# Patient Record
Sex: FEMALE | Race: BLACK OR AFRICAN AMERICAN | Employment: OTHER | URBAN - METROPOLITAN AREA
[De-identification: names, ages, dates, MRNs, and addresses within clinical notes are randomized per-mention and may not be internally consistent; named-entity substitution may affect disease eponyms.]

---

## 2017-01-09 RX ORDER — LOSARTAN POTASSIUM 100 MG/1
TABLET ORAL
Qty: 90 TAB | Refills: 1 | Status: SHIPPED | COMMUNITY
Start: 2017-01-09 | End: 2017-07-02 | Stop reason: SDUPTHER

## 2017-01-19 ENCOUNTER — OFFICE VISIT (OUTPATIENT)
Dept: INTERNAL MEDICINE CLINIC | Age: 62
End: 2017-01-19

## 2017-01-19 VITALS
WEIGHT: 193 LBS | HEIGHT: 61 IN | HEART RATE: 61 BPM | OXYGEN SATURATION: 98 % | BODY MASS INDEX: 36.44 KG/M2 | DIASTOLIC BLOOD PRESSURE: 64 MMHG | TEMPERATURE: 96.8 F | RESPIRATION RATE: 20 BRPM | SYSTOLIC BLOOD PRESSURE: 133 MMHG

## 2017-01-19 DIAGNOSIS — F31.77 BIPOLAR DISORDER, IN PARTIAL REMISSION, MOST RECENT EPISODE MIXED (HCC): ICD-10-CM

## 2017-01-19 DIAGNOSIS — Z53.20 COLONOSCOPY REFUSED: ICD-10-CM

## 2017-01-19 DIAGNOSIS — I10 ESSENTIAL HYPERTENSION: ICD-10-CM

## 2017-01-19 DIAGNOSIS — E78.00 PURE HYPERCHOLESTEROLEMIA: Chronic | ICD-10-CM

## 2017-01-19 DIAGNOSIS — K59.00 CONSTIPATION, UNSPECIFIED CONSTIPATION TYPE: ICD-10-CM

## 2017-01-19 DIAGNOSIS — E11.9 TYPE 2 DIABETES MELLITUS WITHOUT COMPLICATION, WITHOUT LONG-TERM CURRENT USE OF INSULIN (HCC): Primary | ICD-10-CM

## 2017-01-19 RX ORDER — SIMVASTATIN 20 MG/1
20 TABLET, FILM COATED ORAL
Qty: 90 TAB | Refills: 1 | Status: SHIPPED | OUTPATIENT
Start: 2017-01-19 | End: 2017-07-22 | Stop reason: SDUPTHER

## 2017-01-19 RX ORDER — AMOXICILLIN 250 MG
1 CAPSULE ORAL DAILY
Qty: 30 TAB | Refills: 3 | Status: SHIPPED | OUTPATIENT
Start: 2017-01-19 | End: 2018-02-14 | Stop reason: SDUPTHER

## 2017-01-19 RX ORDER — CHOLECALCIFEROL (VITAMIN D3) 125 MCG
CAPSULE ORAL
COMMUNITY

## 2017-01-19 RX ORDER — POLYETHYLENE GLYCOL 3350 17 G/17G
17 POWDER, FOR SOLUTION ORAL DAILY
Qty: 850 G | Refills: 3 | Status: SHIPPED | OUTPATIENT
Start: 2017-01-19 | End: 2018-02-14 | Stop reason: SDUPTHER

## 2017-01-19 NOTE — PROGRESS NOTES
HISTORY OF PRESENT ILLNESS  Thomas Fischer is a 59 y.o. female here for follow up. Doing well with all meds. BP seems OK.has COPD.use inhaler,stable. Has depresion is stable. feeling OK. seeing psych. Has diabetes. watching diet. need labs.eye check up is up to date. She has bipolar disorder. seeing psych. stable. Follow-up   Pertinent negatives include no chest pain. COPD   Pertinent negatives include no chest pain. Hypertension    Pertinent negatives include no chest pain, no palpitations and no blurred vision. Diabetes   Pertinent negatives include no chest pain. Constipation    Associated symptoms include constipation. Pertinent negatives include no chills and no fever. Mental Health Problem   Pertinent negatives include no chest pain. Review of Systems   Constitutional: Negative. Negative for chills and fever. HENT: Negative. Eyes: Negative. Negative for blurred vision and double vision. Respiratory: Negative. Cardiovascular: Negative. Negative for chest pain and palpitations. Gastrointestinal: Positive for constipation. Genitourinary: Negative. Musculoskeletal: Negative. Neurological: Negative. Psychiatric/Behavioral: Negative. Physical Exam   Constitutional: She appears well-developed and well-nourished. No distress. Neck: Normal range of motion. Neck supple. No JVD present. No thyromegaly present. Cardiovascular: Normal rate, regular rhythm, normal heart sounds and intact distal pulses. Pulmonary/Chest: Effort normal and breath sounds normal. No respiratory distress. She has no wheezes. Musculoskeletal: She exhibits no edema or tenderness. Psychiatric: She has a normal mood and affect. Her behavior is normal.       ASSESSMENT and PLAN    Ramon Lamar was seen today for follow-up, copd, hypertension, diabetes and constipation.     Diagnoses and all orders for this visit:    Type 2 diabetes mellitus without complication, without long-term current use of insulin (HCC)    Stable. on meds. Essential hypertension    On losartan and norvasc. Pure hypercholesterolemia    Will refill,  -     simvastatin (ZOCOR) 20 mg tablet; Take 1 Tab by mouth nightly. Bipolar disorder, in partial remission, most recent episode mixed (HCC)    Stable. on meds. seeing . Constipation, unspecified constipation type    High fibre diet. -     polyethylene glycol (MIRALAX) 17 gram/dose powder; Take 17 g by mouth daily. -     senna-docusate (PERICOLACE) 8.6-50 mg per tablet; Take 1 Tab by mouth daily. Colonoscopy refused        Discussed expected course/resolution/complications of diagnosis in detail with patient. Medication risks/benefits/costs/interactions/alternatives discussed with patient. Pt was given an after visit summary which includes diagnoses, current medications & vitals. Pt expressed understanding with the diagnosis and plan.

## 2017-01-19 NOTE — PROGRESS NOTES
Health Maintenance Due   Topic Date Due    Hepatitis C Screening  1955    Pneumococcal 19-64 Medium Risk (1 of 1 - PPSV23) 07/07/1972    DTaP/Tdap/Td series (1 - Tdap) 07/07/1974    PAP AKA CERVICAL CYTOLOGY  07/07/1974    FOBT Q 1 YEAR AGE 50-75  07/07/2003    ZOSTER VACCINE AGE 60>  07/07/2013    FOOT EXAM Q1  06/08/2016    INFLUENZA AGE 9 TO ADULT  08/01/2016    HEMOGLOBIN A1C Q6M  02/10/2017       Chief Complaint   Patient presents with    Follow-up     4 month    COPD    Hypertension    Diabetes    Constipation     states is constipated all the time       1. Have you been to the ER, urgent care clinic since your last visit? Hospitalized since your last visit? No    2. Have you seen or consulted any other health care providers outside of the [address] [place] [address] since your last visit? Include any pap smears or colon screening. No    3) Do you have an Advance Directive on file? no    4) Are you interested in receiving information on Advance Directives? NO      Patient is accompanied by self I have received verbal consent from Kristian Cat to discuss any/all medical information while they are present in the room.

## 2017-01-19 NOTE — MR AVS SNAPSHOT
Visit Information Date & Time Provider Department Dept. Phone Encounter #  
 1/19/2017  1:00 PM Asha Flores MD [facility] Internal Medicine [phone] [id number] Upcoming Health Maintenance Date Due Hepatitis C Screening 1955 Pneumococcal 19-64 Medium Risk (1 of 1 - PPSV23) 7/7/1972 DTaP/Tdap/Td series (1 - Tdap) 7/7/1974 PAP AKA CERVICAL CYTOLOGY 7/7/1974 FOBT Q 1 YEAR AGE 50-75 7/7/2003 ZOSTER VACCINE AGE 60> 7/7/2013 FOOT EXAM Q1 6/8/2016 INFLUENZA AGE 9 TO ADULT 8/1/2016 HEMOGLOBIN A1C Q6M 2/10/2017 MICROALBUMIN Q1 4/11/2017 EYE EXAM RETINAL OR DILATED Q1 7/29/2017 LIPID PANEL Q1 11/30/2017 BREAST CANCER SCRN MAMMOGRAM 5/11/2018 Allergies as of 1/19/2017  Review Complete On: 1/19/2017 By: Asha Flores MD  
  
 Severity Noted Reaction Type Reactions Pcn [Penicillins]  02/25/2011    Rash Pcn [Penicillins]  04/12/2011    Hives Current Immunizations  Reviewed on 8/10/2016 Name Date Pneumococcal Conjugate (PCV-13) 8/10/2016 Not reviewed this visit You Were Diagnosed With   
  
 Codes Comments Type 2 diabetes mellitus without complication, without long-term current use of insulin (HCC)    -  Primary ICD-10-CM: E11.9 ICD-9-CM: 250.00 Essential hypertension     ICD-10-CM: I10 
ICD-9-CM: 401.9 Pure hypercholesterolemia     ICD-10-CM: E78.00 ICD-9-CM: 272.0 Bipolar disorder, in partial remission, most recent episode mixed [facility])     ICD-10-CM: F31.77 ICD-9-CM: 296.65 Constipation, unspecified constipation type     ICD-10-CM: K59.00 ICD-9-CM: 564.00 Colonoscopy refused     ICD-10-CM: Z53.20 ICD-9-CM: V64.2 Vitals BP Pulse Temp Resp Height(growth percentile) Weight(growth percentile) 133/64 (BP 1 Location: Left arm, BP Patient Position: Sitting) 61 96.8 °F (36 °C) (Oral) 20 5' 1\" (1.549 m) 193 lb (87.5 kg) SpO2 BMI OB Status Smoking Status 98% 36.47 kg/m2 Postmenopausal Current Every Day Smoker Vitals History BMI and BSA Data Body Mass Index Body Surface Area  
 36.47 kg/m 2 1.94 m 2 Preferred Pharmacy Pharmacy Name Phone [id number]- [place], [place] - [address] [phone] Your Updated Medication List  
  
   
This list is accurate as of: 1/19/17  1:42 PM.  Always use your most recent med list. amLODIPine 10 mg tablet Commonly known as:  Voncile Mehama TAKE 1 TABLET BY MOUTH EVERY DAY  
  
 ammonium lactate 12 % lotion Commonly known as:  LAC-HYDRIN TWELVE  
rub in to affected area well  
  
 aspirin delayed-release 81 mg tablet Take  by mouth daily. Blood-Glucose Meter monitoring kit Check sugar QD  
  
 famotidine 20 mg tablet Commonly known as:  PEPCID Take 1 Tab by mouth nightly. FLUoxetine 20 mg capsule Commonly known as:  PROzac TAKE 1 TABLET BY MOUTH EVERY MORNING. glucose blood VI test strips strip Commonly known as:  ASCENSIA AUTODISC VI, ONE TOUCH ULTRA TEST VI Check BS QD Lancets Misc Check blood sugar one time a day  
  
 losartan 100 mg tablet Commonly known as:  COZAAR  
TAKE 1 TABLET EVERY DAY  
  
 metFORMIN 500 mg tablet Commonly known as:  GLUCOPHAGE  
TAKE 1 TABLET BY MOUTH TWO (2) TIMES DAILY (WITH MEALS). metoprolol tartrate 50 mg tablet Commonly known as:  LOPRESSOR  
TAKE 1 TABLET TWICE A DAY  
  
 polyethylene glycol 17 gram/dose powder Commonly known as:  Cathleen  Take 17 g by mouth daily. RISPERDAL M-TAB PO Take  by mouth daily. senna-docusate 8.6-50 mg per tablet Commonly known as:  Euna Brill Take 1 Tab by mouth daily. simvastatin 20 mg tablet Commonly known as:  ZOCOR Take 1 Tab by mouth nightly. TRILEPTAL PO Take 600 mg by mouth daily. VITAMIN D3 2,000 unit Tab Generic drug:  cholecalciferol (vitamin D3) Take  by mouth. Prescriptions Sent to Pharmacy Refills  
 polyethylene glycol (MIRALAX) 17 gram/dose powder 3 Sig: Take 17 g by mouth daily. Class: Normal  
 Pharmacy: [facility]/pharmacy [address] [place] [address] [address] Ph #: [phone] Route: Oral  
 senna-docusate (PERICOLACE) 8.6-50 mg per tablet 3 Sig: Take 1 Tab by mouth daily. Class: Normal  
 Pharmacy: [facility]/pharmacy [address] [place] [address] [address] Ph #: [phone] Route: Oral  
 simvastatin (ZOCOR) 20 mg tablet 1 Sig: Take 1 Tab by mouth nightly. Class: Normal  
 Pharmacy: [facility]/pharmacy [address] [place] [address] [address] Ph #: [phone] Route: Oral  
  
Patient Instructions High-Fiber Diet: Care Instructions Your Care Instructions A high-fiber diet may help you relieve constipation and feel less bloated. Your doctor and dietitian will help you make a high-fiber eating plan based on your personal needs. The plan will include the things you like to eat. It will also make sure that you get 30 grams of fiber a day. Before you make changes to the way you eat, be sure to talk with your doctor or dietitian. Follow-up care is a key part of your treatment and safety. Be sure to make and go to all appointments, and call your doctor if you are having problems. It's also a good idea to know your test results and keep a list of the medicines you take. How can you care for yourself at home? · You can increase how much fiber you get if you eat more of certain foods. These foods include: ¨ Whole-grain breads and cereals. ¨ Fruits, such as pears, apples, and peaches. Eat the skins, peels, and seeds, if you can. ¨ Vegetables, such as broccoli, cabbage, spinach, carrots, asparagus, and squash. ¨ Starchy vegetables. These include potatoes with skins, kidney beans, and lima beans. · Take a fiber supplement every day if your doctor recommends it. Examples are Benefiber, Citrucel, FiberCon, and Metamucil. Ask your doctor how much to take. · Drink plenty of fluids, enough so that your urine is light yellow or clear like water. If you have kidney, heart, or liver disease and have to limit fluids, talk with your doctor before you increase the amount of fluids you drink. · Get some exercise every day. Exercise helps stool move through the colon. It also helps prevent constipation. · Keep a food diary. Try to notice and write down what foods cause gas, pain, or other symptoms. Then you can avoid these foods. Where can you learn more? Go to http://duncan-niles.info/. Enter D366 in the search box to learn more about \"High-Fiber Diet: Care Instructions. \" Current as of: July 26, 2016 Content Version: 11.1 © 1546-8629 [employer] [place]. Care instructions adapted under license by [employer] (which disclaims liability or warranty for this information). If you have questions about a medical condition or this instruction, always ask your healthcare professional. [place] [address] any warranty or liability for your use of this information. Introducing [facility]! Dear Phil Wills: Thank you for requesting a Hexagram 49 account. Our records indicate that you have previously registered for a Hexagram 49 account but its currently inactive. Please call our Hexagram 49 support line at [phone]. Additional Information If you have questions, please visit the Frequently Asked Questions section of the Hexagram 49 website at [email or web address] Remember, Hexagram 49 is NOT to be used for urgent needs. For medical emergencies, dial 911. Now available from your iPhone and Android! Please provide this summary of care documentation to your next provider. Your primary care clinician is listed as [facility] Nurse Navigator. If you have any questions after today's visit, please call [phone].

## 2017-01-19 NOTE — PATIENT INSTRUCTIONS
High-Fiber Diet: Care Instructions  Your Care Instructions  A high-fiber diet may help you relieve constipation and feel less bloated. Your doctor and dietitian will help you make a high-fiber eating plan based on your personal needs. The plan will include the things you like to eat. It will also make sure that you get 30 grams of fiber a day. Before you make changes to the way you eat, be sure to talk with your doctor or dietitian. Follow-up care is a key part of your treatment and safety. Be sure to make and go to all appointments, and call your doctor if you are having problems. It's also a good idea to know your test results and keep a list of the medicines you take. How can you care for yourself at home? · You can increase how much fiber you get if you eat more of certain foods. These foods include:  ¨ Whole-grain breads and cereals. ¨ Fruits, such as pears, apples, and peaches. Eat the skins, peels, and seeds, if you can. ¨ Vegetables, such as broccoli, cabbage, spinach, carrots, asparagus, and squash. ¨ Starchy vegetables. These include potatoes with skins, kidney beans, and lima beans. · Take a fiber supplement every day if your doctor recommends it. Examples are Benefiber, Citrucel, FiberCon, and Metamucil. Ask your doctor how much to take. · Drink plenty of fluids, enough so that your urine is light yellow or clear like water. If you have kidney, heart, or liver disease and have to limit fluids, talk with your doctor before you increase the amount of fluids you drink. · Get some exercise every day. Exercise helps stool move through the colon. It also helps prevent constipation. · Keep a food diary. Try to notice and write down what foods cause gas, pain, or other symptoms. Then you can avoid these foods. Where can you learn more? Go to http://duncan-niles.info/. Enter B247 in the search box to learn more about \"High-Fiber Diet: Care Instructions. \"  Current as of: July 26, 2016  Content Version: 11.1  © 1809-5843 [employer]. Care instructions adapted under license by [employer] (which disclaims liability or warranty for this information). If you have questions about a medical condition or this instruction, always ask your healthcare professional. [place] [address] any warranty or liability for your use of this information.

## 2017-03-30 RX ORDER — METFORMIN HYDROCHLORIDE 500 MG/1
TABLET ORAL
Qty: 180 TAB | Refills: 1 | Status: SHIPPED | OUTPATIENT
Start: 2017-03-30 | End: 2017-09-25 | Stop reason: SDUPTHER

## 2017-04-11 RX ORDER — METOPROLOL TARTRATE 50 MG/1
TABLET ORAL
Qty: 180 TAB | Refills: 1 | Status: SHIPPED | OUTPATIENT
Start: 2017-04-11 | End: 2017-10-05 | Stop reason: SDUPTHER

## 2017-05-25 ENCOUNTER — HOSPITAL ENCOUNTER (OUTPATIENT)
Dept: MAMMOGRAPHY | Age: 62
Discharge: HOME | End: 2017-05-25
Attending: INTERNAL MEDICINE
Payer: COMMERCIAL

## 2017-05-25 DIAGNOSIS — Z12.31 VISIT FOR SCREENING MAMMOGRAM: ICD-10-CM

## 2017-05-25 PROCEDURE — 77067 SCR MAMMO BI INCL CAD: CPT

## 2017-06-22 ENCOUNTER — HOSPITAL ENCOUNTER (OUTPATIENT)
Dept: MAMMOGRAPHY | Age: 62
Discharge: HOME | End: 2017-06-22
Attending: INTERNAL MEDICINE
Payer: COMMERCIAL

## 2017-06-22 DIAGNOSIS — R92.8 ABNORMAL MAMMOGRAM OF RIGHT BREAST: ICD-10-CM

## 2017-06-22 PROCEDURE — 77065 DX MAMMO INCL CAD UNI: CPT

## 2017-06-22 NOTE — LETTER
6/28/2017 9:33 AM 
 
Ms. Aruna Saldana [address] [phone] Dear Aruna Saldana: 
 
Please find your most recent results below. Resulted Orders [facility] MAMMO RT DX INCL CAD Narrative EXAM:  [facility] MAMMO RT DX INCL CAD INDICATION:  right add'l v for calcs COMPARISON STUDY: Screening mammogram of 5/25/2017 and earlier studies BREAST COMPOSITION:  Scattered fibroglandular densities UNILATERAL DIGITAL RIGHT DIAGNOSTIC MAMMOGRAPHY: 
Unilateral digital right mammography was performed with ML and magnification 
views views. Images were reviewed with the CAD interpretation system. The calcifications of concern in the superior aspect of the right breast have 
increased slightly in number and density since recent previous studies, but 
appear relatively coarse. There are multiple other groups of calcifications 
which are stable since at least 2014 but better depicted currently because of 
technical differences. Impression IMPRESSION: 
1. BIRADS 3: Probably Benign Finding. 2. Followup unilateral right mammography in 6 months is recommended, to include 
magnification views. Patient was informed of the findings. RECOMMENDATIONS: 
None. Keep up the good work! Follow up in 6 months Please call me if you have any questions: [phone] Sincerely, [facility] 1

## 2017-06-28 ENCOUNTER — OFFICE VISIT (OUTPATIENT)
Dept: INTERNAL MEDICINE CLINIC | Age: 62
End: 2017-06-28

## 2017-06-28 VITALS
TEMPERATURE: 98.4 F | DIASTOLIC BLOOD PRESSURE: 65 MMHG | SYSTOLIC BLOOD PRESSURE: 142 MMHG | WEIGHT: 188.6 LBS | BODY MASS INDEX: 35.61 KG/M2 | OXYGEN SATURATION: 95 % | HEIGHT: 61 IN | HEART RATE: 62 BPM | RESPIRATION RATE: 17 BRPM

## 2017-06-28 DIAGNOSIS — I10 ESSENTIAL HYPERTENSION: ICD-10-CM

## 2017-06-28 DIAGNOSIS — E11.9 TYPE 2 DIABETES MELLITUS WITHOUT COMPLICATION, WITHOUT LONG-TERM CURRENT USE OF INSULIN (HCC): Primary | ICD-10-CM

## 2017-06-28 DIAGNOSIS — Z78.0 MENOPAUSE: ICD-10-CM

## 2017-06-28 DIAGNOSIS — J44.9 CHRONIC OBSTRUCTIVE PULMONARY DISEASE, UNSPECIFIED COPD TYPE (HCC): ICD-10-CM

## 2017-06-28 DIAGNOSIS — E87.1 HYPONATREMIA: ICD-10-CM

## 2017-06-28 NOTE — PROGRESS NOTES
Chief Complaint   Patient presents with    COPD    Hypertension    Diabetes     1. Have you been to the ER, urgent care clinic since your last visit? Hospitalized since your last visit? No    2. Have you seen or consulted any other health care providers outside of the [address] [place] [address] since your last visit? Include any pap smears or colon screening.  No

## 2017-06-28 NOTE — LETTER
7/5/2017 3:38 PM 
 
Ms. Jessenia Mancera [address] [place] [phone] Dear Jessenia Mancera: 
 
Please find your most recent results below. Resulted Orders METABOLIC PANEL, COMPREHENSIVE Result Value Ref Range Glucose 127 (H) 65 - 99 mg/dL BUN 7 (L) 8 - 27 mg/dL Creatinine 0.54 (L) 0.57 - 1.00 mg/dL GFR est non- >59 mL/min/1.73 GFR est  >59 mL/min/1.73  
 BUN/Creatinine ratio 13 12 - 28 Sodium 128 (L) 134 - 144 mmol/L Potassium 5.3 (H) 3.5 - 5.2 mmol/L Chloride 90 (L) 96 - 106 mmol/L  
 CO2 19 18 - 29 mmol/L Calcium 9.3 8.7 - 10.3 mg/dL Protein, total 7.2 6.0 - 8.5 g/dL Albumin 4.4 3.6 - 4.8 g/dL GLOBULIN, TOTAL 2.8 1.5 - 4.5 g/dL A-G Ratio 1.6 1.2 - 2.2 Bilirubin, total 0.3 0.0 - 1.2 mg/dL Alk. phosphatase 117 39 - 117 IU/L  
 AST (SGOT) 21 0 - 40 IU/L  
 ALT (SGPT) 15 0 - 32 IU/L Narrative Performed at:  [address]  [id number] : Kaycee Chavira MD, Phone:  [phone] HEMOGLOBIN A1C WITH EAG Result Value Ref Range Hemoglobin A1c 6.3 (H) 4.8 - 5.6 % Comment:  
            Pre-diabetes: 5.7 - 6.4 Diabetes: >6.4 Glycemic control for adults with diabetes: <7.0 Estimated average glucose 134 mg/dL Narrative Performed at:  [address]  [id number] : Kaycee Chavira MD, Phone:  [phone] MICROALBUMIN, UR, RAND W/ MICROALBUMIN/CREA RATIO Result Value Ref Range Creatinine, urine 43.3 Not Estab. mg/dL Microalbumin, urine 155.7 Not Estab. ug/mL Microalb/Creat ratio (ug/mg creat.) 359.6 (H) 0.0 - 30.0 mg/g creat Narrative Performed at:  [address]  [id number] : Kaycee Chavira MD, Phone:  [phone] CBC W/O DIFF Result Value Ref Range WBC 10.5 3.4 - 10.8 x10E3/uL RBC 4.40 3.77 - 5.28 x10E6/uL HGB 14.8 11.1 - 15.9 g/dL HCT 42.2 34.0 - 46.6 % MCV 96 79 - 97 fL  
 MCH 33.6 (H) 26.6 - 33.0 pg  
 MCHC 35.1 31.5 - 35.7 g/dL  
 RDW 14.1 12.3 - 15.4 % PLATELET 432 311 - 093 x10E3/uL Narrative Performed at:  [address]  [id number] : Kathryn Avery MD, Phone:  [phone] RECOMMENDATIONS: 
Nina Good your labs indicate that you have hyponatremia which is probably due to one of your medications, Trileptal, please contact your psych ASAP to reduce dosage and we will repeat your labs in 2 weeks. I have enclosed that lab slip for your convenience Please call me if you have any questions: [phone] Sincerely, Norbert Burciaga MD

## 2017-06-28 NOTE — PROGRESS NOTES
HISTORY OF PRESENT ILLNESS  Gabrielle Owens is a 59 y.o. female here for follow up. Doing well with all meds. BP seems OK.has COPD. on inhaler. She is taking prozac and trileptal..depresion is stable. feeling OK. Has diabetes. watching diet. need labs.eye check up is up to date. Need dexa scan. COPD   Pertinent negatives include no chest pain. Hypertension    Pertinent negatives include no chest pain, no palpitations and no blurred vision. Diabetes   Pertinent negatives include no chest pain. Follow-up   Pertinent negatives include no chest pain. Mental Health Problem   Pertinent negatives include no chest pain. Review of Systems   Constitutional: Negative. Negative for chills and fever. HENT: Negative. Eyes: Negative. Negative for blurred vision and double vision. Respiratory: Negative. Cardiovascular: Negative. Negative for chest pain and palpitations. Gastrointestinal: Negative. Genitourinary: Negative. Musculoskeletal: Negative. Neurological: Negative. Psychiatric/Behavioral: Negative. Physical Exam   Constitutional: She appears well-developed and well-nourished. No distress. Neck: Normal range of motion. Neck supple. No JVD present. No thyromegaly present. Cardiovascular: Normal rate, regular rhythm, normal heart sounds and intact distal pulses. Pulmonary/Chest: Effort normal and breath sounds normal. No respiratory distress. She has no wheezes. Musculoskeletal: She exhibits no edema or tenderness. Psychiatric: She has a normal mood and affect. Her behavior is normal.       ASSESSMENT and Scharjerrie Reema was seen today for copd, hypertension and diabetes. Diagnoses and all orders for this visit:    Type 2 diabetes mellitus without complication, without long-term current use of insulin ([address]    On metformin.   Will do,  -     METABOLIC PANEL, COMPREHENSIVE  -     HEMOGLOBIN A1C WITH EAG  -     MICROALBUMIN, UR, RAND W/ MICROALBUMIN/CREA RATIO  -     CBC W/O DIFF  -     REFERRAL TO OPHTHALMOLOGY    Chronic obstructive pulmonary disease, unspecified COPD type (HCC)    Stable. on inhaler. Will do,  -     CBC W/O DIFF    Essential hypertension    On med. will do,  -     METABOLIC PANEL, COMPREHENSIVE    Menopause    Will order,  -     DEXA BONE DENSITY STUDY AXIAL; Future      Discussed expected course/resolution/complications of diagnosis in detail with patient. Medication risks/benefits/costs/interactions/alternatives discussed with patient. Pt was given an after visit summary which includes diagnoses, current medications & vitals. Pt expressed understanding with the diagnosis and plan.

## 2017-06-29 LAB
ALBUMIN SERPL-MCNC: 4.4 G/DL (ref 3.6–4.8)
ALBUMIN/CREAT UR: 359.6 MG/G CREAT (ref 0–30)
ALBUMIN/GLOB SERPL: 1.6 {RATIO} (ref 1.2–2.2)
ALP SERPL-CCNC: 117 IU/L (ref 39–117)
ALT SERPL-CCNC: 15 IU/L (ref 0–32)
AST SERPL-CCNC: 21 IU/L (ref 0–40)
BILIRUB SERPL-MCNC: 0.3 MG/DL (ref 0–1.2)
BUN SERPL-MCNC: 7 MG/DL (ref 8–27)
BUN/CREAT SERPL: 13 (ref 12–28)
CALCIUM SERPL-MCNC: 9.3 MG/DL (ref 8.7–10.3)
CHLORIDE SERPL-SCNC: 90 MMOL/L (ref 96–106)
CO2 SERPL-SCNC: 19 MMOL/L (ref 18–29)
CREAT SERPL-MCNC: 0.54 MG/DL (ref 0.57–1)
CREAT UR-MCNC: 43.3 MG/DL
ERYTHROCYTE [DISTWIDTH] IN BLOOD BY AUTOMATED COUNT: 14.1 % (ref 12.3–15.4)
EST. AVERAGE GLUCOSE BLD GHB EST-MCNC: 134 MG/DL
GLOBULIN SER CALC-MCNC: 2.8 G/DL (ref 1.5–4.5)
GLUCOSE SERPL-MCNC: 127 MG/DL (ref 65–99)
HBA1C MFR BLD: 6.3 % (ref 4.8–5.6)
HCT VFR BLD AUTO: 42.2 % (ref 34–46.6)
HGB BLD-MCNC: 14.8 G/DL (ref 11.1–15.9)
MCH RBC QN AUTO: 33.6 PG (ref 26.6–33)
MCHC RBC AUTO-ENTMCNC: 35.1 G/DL (ref 31.5–35.7)
MCV RBC AUTO: 96 FL (ref 79–97)
MICROALBUMIN UR-MCNC: 155.7 UG/ML
PLATELET # BLD AUTO: 242 X10E3/UL (ref 150–379)
POTASSIUM SERPL-SCNC: 5.3 MMOL/L (ref 3.5–5.2)
PROT SERPL-MCNC: 7.2 G/DL (ref 6–8.5)
RBC # BLD AUTO: 4.4 X10E6/UL (ref 3.77–5.28)
SODIUM SERPL-SCNC: 128 MMOL/L (ref 134–144)
WBC # BLD AUTO: 10.5 X10E3/UL (ref 3.4–10.8)

## 2017-07-03 RX ORDER — AMLODIPINE BESYLATE 10 MG/1
TABLET ORAL
Qty: 90 TAB | Refills: 1 | Status: SHIPPED | OUTPATIENT
Start: 2017-07-03 | End: 2017-12-23 | Stop reason: SDUPTHER

## 2017-07-03 RX ORDER — LOSARTAN POTASSIUM 100 MG/1
TABLET ORAL
Qty: 90 TAB | Refills: 1 | Status: SHIPPED | OUTPATIENT
Start: 2017-07-03 | End: 2017-12-23 | Stop reason: SDUPTHER

## 2017-07-05 NOTE — PROGRESS NOTES
Trileptal is causing hypontremia. need to reduce dosage and repeat sodium in 2 weeks. need to see psych ASAp.

## 2017-07-22 DIAGNOSIS — E78.00 PURE HYPERCHOLESTEROLEMIA: Chronic | ICD-10-CM

## 2017-07-24 RX ORDER — SIMVASTATIN 20 MG/1
TABLET, FILM COATED ORAL
Qty: 90 TAB | Refills: 1 | Status: SHIPPED | OUTPATIENT
Start: 2017-07-24 | End: 2017-10-05 | Stop reason: SDUPTHER

## 2017-09-25 RX ORDER — METFORMIN HYDROCHLORIDE 500 MG/1
TABLET ORAL
Qty: 180 TAB | Refills: 1 | Status: SHIPPED | OUTPATIENT
Start: 2017-09-25 | End: 2018-03-22 | Stop reason: SDUPTHER

## 2017-10-05 DIAGNOSIS — E78.00 PURE HYPERCHOLESTEROLEMIA: Chronic | ICD-10-CM

## 2017-10-05 RX ORDER — SIMVASTATIN 20 MG/1
20 TABLET, FILM COATED ORAL
Qty: 90 TAB | Refills: 1 | Status: SHIPPED | OUTPATIENT
Start: 2017-10-05 | End: 2018-03-05 | Stop reason: SDUPTHER

## 2017-10-05 RX ORDER — METOPROLOL TARTRATE 50 MG/1
TABLET ORAL
Qty: 180 TAB | Refills: 1 | Status: SHIPPED | COMMUNITY
Start: 2017-10-05 | End: 2018-03-30 | Stop reason: SDUPTHER

## 2017-10-06 RX ORDER — BLOOD SUGAR DIAGNOSTIC
STRIP MISCELLANEOUS
Qty: 100 STRIP | Refills: 0 | Status: SHIPPED | OUTPATIENT
Start: 2017-10-06 | End: 2022-10-20 | Stop reason: SDUPTHER

## 2017-10-25 ENCOUNTER — HOSPITAL ENCOUNTER (OUTPATIENT)
Dept: GENERAL RADIOLOGY | Age: 62
Discharge: HOME | End: 2017-10-25
Payer: COMMERCIAL

## 2017-10-25 ENCOUNTER — OFFICE VISIT (OUTPATIENT)
Dept: INTERNAL MEDICINE CLINIC | Age: 62
End: 2017-10-25

## 2017-10-25 VITALS
HEIGHT: 61 IN | SYSTOLIC BLOOD PRESSURE: 142 MMHG | HEART RATE: 61 BPM | DIASTOLIC BLOOD PRESSURE: 65 MMHG | OXYGEN SATURATION: 97 % | TEMPERATURE: 97.9 F | RESPIRATION RATE: 20 BRPM | WEIGHT: 183.4 LBS | BODY MASS INDEX: 34.63 KG/M2

## 2017-10-25 DIAGNOSIS — Z11.59 NEED FOR HEPATITIS C SCREENING TEST: ICD-10-CM

## 2017-10-25 DIAGNOSIS — F17.200 SMOKING: ICD-10-CM

## 2017-10-25 DIAGNOSIS — E11.9 TYPE 2 DIABETES MELLITUS WITHOUT COMPLICATION, WITHOUT LONG-TERM CURRENT USE OF INSULIN (HCC): Primary | ICD-10-CM

## 2017-10-25 DIAGNOSIS — J44.9 CHRONIC OBSTRUCTIVE PULMONARY DISEASE, UNSPECIFIED COPD TYPE (HCC): ICD-10-CM

## 2017-10-25 DIAGNOSIS — J41.0 SMOKERS' COUGH (HCC): ICD-10-CM

## 2017-10-25 DIAGNOSIS — E78.00 PURE HYPERCHOLESTEROLEMIA: Chronic | ICD-10-CM

## 2017-10-25 DIAGNOSIS — E55.9 VITAMIN D DEFICIENCY: ICD-10-CM

## 2017-10-25 PROCEDURE — 71020 XR CHEST PA LAT: CPT

## 2017-10-25 NOTE — PROGRESS NOTES
Health Maintenance Due   Topic Date Due    Hepatitis C Screening  1955    Pneumococcal 19-64 Medium Risk (1 of 1 - PPSV23) 07/07/1972    DTaP/Tdap/Td series (1 - Tdap) 07/07/1974    PAP AKA CERVICAL CYTOLOGY  07/07/1974    FOBT Q 1 YEAR AGE 50-75  07/07/2003    ZOSTER VACCINE AGE 60>  05/07/2013    FOOT EXAM Q1  06/08/2016    EYE EXAM RETINAL OR DILATED Q1  07/29/2017       Chief Complaint   Patient presents with    COPD    Hypertension    Diabetes    Mental Health Problem    Cough     states dry cough x a few weeks       1. Have you been to the ER, urgent care clinic since your last visit? Hospitalized since your last visit? No    2. Have you seen or consulted any other health care providers outside of the [address] [place] [address] since your last visit? Include any pap smears or colon screening. No    3) Do you have an Advance Directive on file? no    4) Are you interested in receiving information on Advance Directives? NO      Patient is accompanied by self I have received verbal consent from Naty Bakrer to discuss any/all medical information while they are present in the room.

## 2017-10-25 NOTE — PATIENT INSTRUCTIONS

## 2017-10-25 NOTE — LETTER
11/6/2017 11:02 AM 
 
Ms. Latisha Roberson [address] [place] [phone] Dear Latisha Roberson: 
 
Please find your most recent results below. Resulted Orders METABOLIC PANEL, COMPREHENSIVE Result Value Ref Range Glucose 131 (H) 65 - 99 mg/dL BUN 4 (L) 8 - 27 mg/dL Creatinine 0.60 0.57 - 1.00 mg/dL GFR est non-AA 97 >59 mL/min/1.73 GFR est  >59 mL/min/1.73  
 BUN/Creatinine ratio 7 (L) 12 - 28 Sodium 134 134 - 144 mmol/L Potassium 5.3 (H) 3.5 - 5.2 mmol/L Chloride 94 (L) 96 - 106 mmol/L  
 CO2 21 18 - 29 mmol/L Calcium 9.7 8.7 - 10.3 mg/dL Protein, total 7.3 6.0 - 8.5 g/dL Albumin 4.4 3.6 - 4.8 g/dL GLOBULIN, TOTAL 2.9 1.5 - 4.5 g/dL A-G Ratio 1.5 1.2 - 2.2 Bilirubin, total 0.5 0.0 - 1.2 mg/dL Alk. phosphatase 109 39 - 117 IU/L  
 AST (SGOT) 19 0 - 40 IU/L  
 ALT (SGPT) 13 0 - 32 IU/L Narrative Performed at:  [address]  [id number] : Darletta Dancer MD, Phone:  [phone] LIPID PANEL Result Value Ref Range Cholesterol, total 185 100 - 199 mg/dL Triglyceride 140 0 - 149 mg/dL HDL Cholesterol 92 >39 mg/dL VLDL, calculated 28 5 - 40 mg/dL LDL, calculated 65 0 - 99 mg/dL Narrative Performed at:  [address]  [id number] : Darletta Dancer MD, Phone:  [phone] HEMOGLOBIN A1C WITH EAG Result Value Ref Range Hemoglobin A1c 6.3 (H) 4.8 - 5.6 % Comment:  
            Pre-diabetes: 5.7 - 6.4 Diabetes: >6.4 Glycemic control for adults with diabetes: <7.0 Estimated average glucose 134 mg/dL Narrative Performed at:  [facility] [address] [place] [address]  [id number] : Darletta Dancer MD, Phone:  [phone] VITAMIN D, 25 HYDROXY Result Value Ref Range VITAMIN D, 25-HYDROXY 23.0 (L) 30.0 - 100.0 ng/mL Comment: Vitamin D deficiency has been defined by the [facility] practice guideline as a 
level of serum 25-OH vitamin D less than 20 ng/mL (1,2). The Endocrine Society went on to further define vitamin D 
insufficiency as a level between 21 and 29 ng/mL (2). 1. IOM ([place] of Medicine). 2010. Dietary reference 
   intakes for calcium and D. [facility]: The 
   4s91.com. 2. Isha MF, Laura NC, Leland BREWER, et al. 
   Evaluation, treatment, and prevention of vitamin D 
   deficiency: an Endocrine Society clinical practice 
   guideline. JCEM. 2011 Jul; 96(7):1911-30. Narrative Performed at:  [address]  [id number] : Bailey Aquino MD, Phone:  [phone] HEPATITIS C AB Result Value Ref Range Hep C Virus Ab <0.1 0.0 - 0.9 s/co ratio Comment:  
                                     Negative:     < 0.8 Indeterminate: 0.8 - 0.9 Positive:     > 0.9 The CDC recommends that a positive HCV antibody result 
 be followed up with a HCV Nucleic Acid Amplification 
 test (026028). Narrative Performed at:  [address]  [id number] : Bailey Aquino MD, Phone:  [phone] CVD REPORT Result Value Ref Range INTERPRETATION Note Comment:  
   Supplemental report is available. Narrative Performed at:  [facility] [place] [address]  [id number] : Noam Polanco PhD, Phone:  [phone] DIABETES PATIENT EDUCATION Result Value Ref Range PDF Image Not applicable Narrative Performed at:  [facility] [place] [address]  [id number] : Noam Polanco PhD, Phone:  [phone] RECOMMENDATIONS: 
 Benito Verdugo your labs indicate that Your Vitamin D level is low, start taking OTC Vitamin D 2000 units 1 x a day for 4 months. Also increase your consumption of milk and exposure to the sun for 20 minutes a day. We will recheck your Vitamin D level in 4 months All other labs are stable Please call me if you have any questions: [phone] Sincerely, Wallace Ji MD

## 2017-10-25 NOTE — LETTER
10/31/2017 10:31 AM 
 
Ms. Luis Erickson [address] [facility] [phone] Dear Luis Erickson: 
 
Please find your most recent results below. Resulted Orders XR CHEST PA LAT Narrative Exam:  2 view chest 
 
Indication: Cough, smoker. COMPARISON: 10/20/2016 PA and lateral views demonstrate normal heart size. The aortic stent is noted 
and unchanged in appearance. The lungs are clear. No pneumonia. No adenopathy. The previous seen noted widening of the right [facility] joint is again noted but this 
does appear that decreased in the interval. 
 
There is bony fusion between the anterior aspect the right first and second ribs 
is unchanged and could be posttraumatic or related to congenital process. Impression IMPRESSION: 
1. No acute process RECOMMENDATIONS: 
None. Keep up the good work! Please call me if you have any questions: [phone] Sincerely, [employer] [facility]

## 2017-10-25 NOTE — MR AVS SNAPSHOT
Visit Information Date & Time Provider Department Dept. Phone Encounter #  
 10/25/2017  1:15 PM Izzy Hayden MD [facility] Internal Medicine [phone] [id number] Upcoming Health Maintenance Date Due Hepatitis C Screening 1955 Pneumococcal 19-64 Medium Risk (1 of 1 - PPSV23) 7/7/1972 DTaP/Tdap/Td series (1 - Tdap) 7/7/1974 PAP AKA CERVICAL CYTOLOGY 7/7/1974 ZOSTER VACCINE AGE 60> 5/7/2013 FOOT EXAM Q1 6/8/2016 EYE EXAM RETINAL OR DILATED Q1 7/29/2017 LIPID PANEL Q1 11/30/2017 HEMOGLOBIN A1C Q6M 12/28/2017 MICROALBUMIN Q1 6/28/2018 BREAST CANCER SCRN MAMMOGRAM 6/22/2019 COLONOSCOPY 10/25/2027 Allergies as of 10/25/2017  Review Complete On: 10/25/2017 By: Izzy Hayden MD  
  
 Severity Noted Reaction Type Reactions Pcn [Penicillins]  02/25/2011    Rash Pcn [Penicillins]  04/12/2011    Hives Current Immunizations  Reviewed on 10/25/2017 Name Date Pneumococcal Conjugate (PCV-13) 8/10/2016 Reviewed by Izzy Hayden MD on 10/25/2017 at  1:35 PM  
You Were Diagnosed With   
  
 Codes Comments Type 2 diabetes mellitus without complication, without long-term current use of insulin (HCC)    -  Primary ICD-10-CM: E11.9 ICD-9-CM: 250.00 Sm[facility]' cough ([facility]     ICD-10-CM: J41.0 ICD-9-CM: 491.0 Smoking     ICD-10-CM: F17.200 ICD-9-CM: 305.1 Chronic obstructive pulmonary disease, unspecified COPD type ([facility]     ICD-10-CM: J44.9 ICD-9-CM: 633 Pure hypercholesterolemia     ICD-10-CM: E78.00 ICD-9-CM: 272.0 Vitamin D deficiency     ICD-10-CM: E55.9 ICD-9-CM: 268.9 Need for hepatitis C screening test     ICD-10-CM: Z11.59 
ICD-9-CM: V73.89 Vitals BP Pulse Temp Resp Height(growth percentile) Weight(growth percentile) 142/65 (BP 1 Location: Left arm, BP Patient Position: Sitting) 61 97.9 °F (36.6 °C) (Oral) 20 5' 1\" (1.549 m) 183 lb 6.4 oz (83.2 kg) SpO2 BMI OB Status Smoking Status 97% 34.65 kg/m2 Postmenopausal Current Every Day Smoker Vitals History BMI and BSA Data Body Mass Index Body Surface Area  
 34.65 kg/m 2 1.89 m 2 Preferred Pharmacy Pharmacy Name Phone [id number]- [place], [place] - [address] [phone] Your Updated Medication List  
  
   
This list is accurate as of: 10/25/17  1:35 PM.  Always use your most recent med list. amLODIPine 10 mg tablet Commonly known as:  Arlyss Daniella TAKE 1 TABLET BY MOUTH EVERY DAY  
  
 ammonium lactate 12 % lotion Commonly known as:  LAC-HYDRIN TWELVE  
rub in to affected area well  
  
 aspirin delayed-release 81 mg tablet Take  by mouth daily. Blood-Glucose Meter monitoring kit Check sugar QD  
  
 famotidine 20 mg tablet Commonly known as:  PEPCID Take 1 Tab by mouth nightly. FLUoxetine 20 mg capsule Commonly known as:  PROzac TAKE 1 TABLET BY MOUTH EVERY MORNING. Lancets Misc Check blood sugar one time a day  
  
 losartan 100 mg tablet Commonly known as:  COZAAR  
TAKE 1 TABLET EVERY DAY  
  
 metFORMIN 500 mg tablet Commonly known as:  GLUCOPHAGE  
TAKE 1 TABLET BY MOUTH TWO (2) TIMES DAILY (WITH MEALS). metoprolol tartrate 50 mg tablet Commonly known as:  LOPRESSOR  
TAKE 1 TABLET TWICE A DAY  
  
 ONETOUCH ULTRA TEST strip Generic drug:  glucose blood VI test strips CHECK BLOOD SUGAR DAILY polyethylene glycol 17 gram/dose powder Commonly known as:  Leata Sans Take 17 g by mouth daily. senna-docusate 8.6-50 mg per tablet Commonly known as:  Angela Manna Take 1 Tab by mouth daily. simvastatin 20 mg tablet Commonly known as:  ZOCOR Take 1 Tab by mouth nightly. tiotropium 18 mcg inhalation capsule Commonly known as:  Sultan Lawrence Take 1 Cap by inhalation daily. TRILEPTAL PO Take 600 mg by mouth daily. VITAMIN D3 2,000 unit Tab Generic drug:  cholecalciferol (vitamin D3) Take  by mouth. Prescriptions Sent to Pharmacy Refills  
 tiotropium (SPIRIVA) 18 mcg inhalation capsule 6 Sig: Take 1 Cap by inhalation daily. Class: Normal  
 Pharmacy: [facility]/pharmacy [id number] [place] [address] [address]  #: [phone] Route: Inhalation We Performed the Following HEMOGLOBIN A1C WITH EAG [33951 CPT(R)] HEPATITIS C AB [13084 CPT(R)] LIPID PANEL [85011 CPT(R)] METABOLIC PANEL, COMPREHENSIVE [53798 CPT(R)] VITAMIN D, 25 HYDROXY H0510610 CPT(R)] To-Do List   
 12/22/2017 9:00 AM  
  Appointment with [facility] VICKIE 1 at [facility] ([phone]) Shower or bathe using soap and water. Do not use deodorant, powder, perfumes, or lotion the day of your exam.  If your prior mammograms were not performed at [facility] 6 please bring films with you or forward prior images 2 days before your procedure. If patient is not a callback diagnostic, the patient must have an order/script from the physician for the diagnostic. Please bring it on the day of the mammogram or have it faxed to the department. [facility]  [phone] [facility] [phone] [place]  [phone] [facility] [phone] [facility] [phone] [facility] [phone] [facility] [phone] [facility] [phone]  
  
 12/22/2017 9:30 AM  
  Appointment with [facility] DEXA 1 at [facility] [address] ([phone]) Please, no calcium supplements or antacids that coat the stomach (ex: Tums, Mylanta) 24 hours prior to procedure. Maintain normal diet and medications. Dairy products are allowed. Wear an outfit with an elastic waistband (no zipper or metal snaps). Check in at registration 15min before your appointment time unless you were instructed to do otherwise. 12/25/2017 Imaging:  XR CHEST PA LAT Patient Instructions Stopping Smoking: Care Instructions Your Care Instructions Cigarette smokers crave the nicotine in cigarettes. Giving it up is much harder than simply changing a habit. Your body has to stop craving the nicotine. It is hard to quit, but you can do it. There are many tools that people use to quit smoking. You may find that combining tools works best for you. There are several steps to quitting. First you get ready to quit. Then you get support to help you. After that, you learn new skills and behaviors to become a nonsmoker. For many people, a necessary step is getting and using medicine. Your doctor will help you set up the plan that best meets your needs. You may want to attend a smoking cessation program to help you quit smoking. When you choose a program, look for one that has proven success. Ask your doctor for ideas. You will greatly increase your chances of success if you take medicine as well as get counseling or join a cessation program. 
Some of the changes you feel when you first quit tobacco are uncomfortable. Your body will miss the nicotine at first, and you may feel short-tempered and grumpy. You may have trouble sleeping or concentrating. Medicine can help you deal with these symptoms. You may struggle with changing your smoking habits and rituals. The last step is the tricky one: Be prepared for the smoking urge to continue for a time. This is a lot to deal with, but keep at it. You will feel better. Follow-up care is a key part of your treatment and safety. Be sure to make and go to all appointments, and call your doctor if you are having problems. Its also a good idea to know your test results and keep a list of the medicines you take. How can you care for yourself at home? · Ask your family, friends, and coworkers for support. You have a better chance of quitting if you have help and support. · Join a support group, such as Nicotine Anonymous, for people who are trying to quit smoking. · Consider signing up for a smoking cessation program, such as the American Lung Association's Freedom from Smoking program. 
· Set a quit date. Pick your date carefully so that it is not right in the middle of a big deadline or stressful time. Once you quit, do not even take a puff. Get rid of all ashtrays and lighters after your last cigarette. Clean your house and your clothes so that they do not smell of smoke. · Learn how to be a nonsmoker. Think about ways you can avoid those things that make you reach for a cigarette. ¨ Avoid situations that put you at greatest risk for smoking. For some people, it is hard to have a drink with friends without smoking. For others, they might skip a coffee break with coworkers who smoke. ¨ Change your daily routine. Take a different route to work or eat a meal in a different place. · Cut down on stress. Calm yourself or release tension by doing an activity you enjoy, such as reading a book, taking a hot bath, or gardening. · Talk to your doctor or pharmacist about nicotine replacement therapy, which replaces the nicotine in your body. You still get nicotine but you do not use tobacco. Nicotine replacement products help you slowly reduce the amount of nicotine you need. These products come in several forms, many of them available over-the-counter: ¨ Nicotine patches ¨ Nicotine gum and lozenges ¨ Nicotine inhaler · Ask your doctor about bupropion (Wellbutrin) or varenicline (Chantix), which are prescription medicines. They do not contain nicotine. They help you by reducing withdrawal symptoms, such as stress and anxiety. · Some people find hypnosis, acupuncture, and massage helpful for ending the smoking habit. · Eat a healthy diet and get regular exercise. Having healthy habits will help your body move past its craving for nicotine. · Be prepared to keep trying.  Most people are not successful the first few times they try to quit. Do not get mad at yourself if you smoke again. Make a list of things you learned and think about when you want to try again, such as next week, next month, or next year. Where can you learn more? Go to http://duncan-niles.info/. Enter O953 in the search box to learn more about \"Stopping Smoking: Care Instructions. \" Current as of: March 20, 2017 Content Version: 11.3 © 9793-5687 [employer]. Care instructions adapted under license by [employer] (which disclaims liability or warranty for this information). If you have questions about a medical condition or this instruction, always ask your healthcare professional. [address] any warranty or liability for your use of this information. Introducing [facility] & HEALTH SERVICES! Dear Moira Childers: Thank you for requesting a [employer] account. Our records indicate that you have previously registered for a [employer] account but its currently inactive. Please call our [employer] support line at [phone]. Additional Information If you have questions, please visit the Frequently Asked Questions section of the [employer] website at [email or web address] Remember, [employer] is NOT to be used for urgent needs. For medical emergencies, dial 911. Now available from your iPhone and Android! Please provide this summary of care documentation to your next provider. Your primary care clinician is listed as [facility] Nurse Navigator. If you have any questions after today's visit, please call [phone].

## 2017-10-25 NOTE — PROGRESS NOTES
HISTORY OF PRESENT ILLNESS  Indra Corley is a 57 y.o. female here for follow up. Reports cough without sputum for 2 weeks on and off.no sputum./No nasal congestion or post nasal drip. She is a smoker for years. not able to quit.lost 4 pound weight. Doing well with all meds. BP seems OK.has COPD.use inhaler,stable. Has diabetes. watching diet. BS average 130 to 180. need labs.eye check up is up to date. She has bipolar disorder. seeing psych. stable. Need lab work. COPD   Pertinent negatives include no chest pain. Hypertension    Pertinent negatives include no chest pain, no palpitations and no blurred vision. Diabetes   Pertinent negatives include no chest pain. Mental Health Problem   Pertinent negatives include no chest pain. Cough   Pertinent negatives include no chest pain. Review of Systems   Constitutional: Negative. Negative for chills and fever. HENT: Negative. Eyes: Negative. Negative for blurred vision and double vision. Respiratory: Positive for cough. Cardiovascular: Negative. Negative for chest pain and palpitations. Gastrointestinal: Positive for constipation. Genitourinary: Negative. Musculoskeletal: Negative. Neurological: Negative. Psychiatric/Behavioral: Negative. Physical Exam   Constitutional: She appears well-developed and well-nourished. No distress. HENT:   Head: Normocephalic and atraumatic. Right Ear: External ear normal.   Left Ear: External ear normal.   Nose: Nose normal.   Mouth/Throat: Oropharynx is clear and moist. No oropharyngeal exudate. Neck: Normal range of motion. Neck supple. No JVD present. No thyromegaly present. Cardiovascular: Normal rate, regular rhythm, normal heart sounds and intact distal pulses. Pulmonary/Chest: Effort normal and breath sounds normal. No respiratory distress. She has no wheezes. Musculoskeletal: She exhibits no edema or tenderness. Psychiatric: She has a normal mood and affect.  Her behavior is normal.       ASSESSMENT and PLAN  Diagnoses and all orders for this visit:    1. Type 2 diabetes mellitus without complication, without long-term current use of insulin ([facility]    On med. Will do,  -     METABOLIC PANEL, COMPREHENSIVE  -     HEMOGLOBIN A1C WITH EAG    2. Smokers' cough ([facility]  Will call in,  -     tiotropium (SPIRIVA) 18 mcg inhalation capsule; Take 1 Cap by inhalation daily. 3. Smoking  Adv to quit smoking. Will do,  -     XR CHEST PA LAT; Future    4. Chronic obstructive pulmonary disease, unspecified COPD type ([facility]  Will get spiriva. 5. Pure hypercholesterolemia  On statin. -     LIPID PANEL    6. Vitamin D deficiency  -     VITAMIN D, 25 HYDROXY    7. Need for hepatitis C screening test  -     HEPATITIS C AB            Discussed expected course/resolution/complications of diagnosis in detail with patient. Medication risks/benefits/costs/interactions/alternatives discussed with patient. Pt was given an after visit summary which includes diagnoses, current medications & vitals. Pt expressed understanding with the diagnosis and plan.

## 2017-10-26 LAB
25(OH)D3+25(OH)D2 SERPL-MCNC: 23 NG/ML (ref 30–100)
ALBUMIN SERPL-MCNC: 4.4 G/DL (ref 3.6–4.8)
ALBUMIN/GLOB SERPL: 1.5 {RATIO} (ref 1.2–2.2)
ALP SERPL-CCNC: 109 IU/L (ref 39–117)
ALT SERPL-CCNC: 13 IU/L (ref 0–32)
AST SERPL-CCNC: 19 IU/L (ref 0–40)
BILIRUB SERPL-MCNC: 0.5 MG/DL (ref 0–1.2)
BUN SERPL-MCNC: 4 MG/DL (ref 8–27)
BUN/CREAT SERPL: 7 (ref 12–28)
CALCIUM SERPL-MCNC: 9.7 MG/DL (ref 8.7–10.3)
CHLORIDE SERPL-SCNC: 94 MMOL/L (ref 96–106)
CHOLEST SERPL-MCNC: 185 MG/DL (ref 100–199)
CO2 SERPL-SCNC: 21 MMOL/L (ref 18–29)
CREAT SERPL-MCNC: 0.6 MG/DL (ref 0.57–1)
EST. AVERAGE GLUCOSE BLD GHB EST-MCNC: 134 MG/DL
GFR SERPLBLD CREATININE-BSD FMLA CKD-EPI: 111 ML/MIN/1.73
GFR SERPLBLD CREATININE-BSD FMLA CKD-EPI: 97 ML/MIN/1.73
GLOBULIN SER CALC-MCNC: 2.9 G/DL (ref 1.5–4.5)
GLUCOSE SERPL-MCNC: 131 MG/DL (ref 65–99)
HBA1C MFR BLD: 6.3 % (ref 4.8–5.6)
HCV AB S/CO SERPL IA: <0.1 S/CO RATIO (ref 0–0.9)
HDLC SERPL-MCNC: 92 MG/DL
INTERPRETATION, 910389: NORMAL
LDLC SERPL CALC-MCNC: 65 MG/DL (ref 0–99)
Lab: NORMAL
POTASSIUM SERPL-SCNC: 5.3 MMOL/L (ref 3.5–5.2)
PROT SERPL-MCNC: 7.3 G/DL (ref 6–8.5)
SODIUM SERPL-SCNC: 134 MMOL/L (ref 134–144)
TRIGL SERPL-MCNC: 140 MG/DL (ref 0–149)
VLDLC SERPL CALC-MCNC: 28 MG/DL (ref 5–40)

## 2017-12-23 RX ORDER — LOSARTAN POTASSIUM 100 MG/1
TABLET ORAL
Qty: 90 TAB | Refills: 1 | Status: SHIPPED | OUTPATIENT
Start: 2017-12-23 | End: 2018-06-19 | Stop reason: SDUPTHER

## 2017-12-26 RX ORDER — AMLODIPINE BESYLATE 10 MG/1
TABLET ORAL
Qty: 90 TAB | Refills: 1 | Status: SHIPPED | OUTPATIENT
Start: 2017-12-26 | End: 2018-06-19 | Stop reason: SDUPTHER

## 2017-12-31 DIAGNOSIS — E78.00 PURE HYPERCHOLESTEROLEMIA: Chronic | ICD-10-CM

## 2018-01-02 RX ORDER — SIMVASTATIN 20 MG/1
TABLET, FILM COATED ORAL
Qty: 90 TAB | Refills: 1 | Status: SHIPPED | OUTPATIENT
Start: 2018-01-02 | End: 2019-01-04 | Stop reason: SDUPTHER

## 2018-02-14 DIAGNOSIS — K59.00 CONSTIPATION, UNSPECIFIED CONSTIPATION TYPE: ICD-10-CM

## 2018-02-14 DIAGNOSIS — L30.9 DERMATITIS: ICD-10-CM

## 2018-02-14 DIAGNOSIS — L85.8 KERATOSIS PILARIS: ICD-10-CM

## 2018-02-14 RX ORDER — AMMONIUM LACTATE 12 G/100G
LOTION TOPICAL
Qty: 400 ML | Refills: 0 | Status: SHIPPED | OUTPATIENT
Start: 2018-02-14 | End: 2018-03-05 | Stop reason: SDUPTHER

## 2018-02-14 RX ORDER — CETIRIZINE HYDROCHLORIDE, PSEUDOEPHEDRINE HYDROCHLORIDE 5; 120 MG/1; MG/1
TABLET, FILM COATED, EXTENDED RELEASE ORAL
Qty: 30 TAB | Refills: 2 | Status: SHIPPED | OUTPATIENT
Start: 2018-02-14 | End: 2022-10-20 | Stop reason: ALTCHOICE

## 2018-02-14 RX ORDER — POLYETHYLENE GLYCOL 3350 17 G/17G
POWDER, FOR SOLUTION ORAL
Qty: 1054 G | Refills: 0 | Status: SHIPPED | OUTPATIENT
Start: 2018-02-14 | End: 2022-10-20 | Stop reason: ALTCHOICE

## 2018-03-05 ENCOUNTER — OFFICE VISIT (OUTPATIENT)
Dept: INTERNAL MEDICINE CLINIC | Age: 63
End: 2018-03-05

## 2018-03-05 VITALS
OXYGEN SATURATION: 99 % | WEIGHT: 184 LBS | SYSTOLIC BLOOD PRESSURE: 148 MMHG | HEART RATE: 60 BPM | HEIGHT: 61 IN | RESPIRATION RATE: 20 BRPM | TEMPERATURE: 98.3 F | DIASTOLIC BLOOD PRESSURE: 69 MMHG | BODY MASS INDEX: 34.74 KG/M2

## 2018-03-05 DIAGNOSIS — L85.8 KERATOSIS PILARIS: ICD-10-CM

## 2018-03-05 DIAGNOSIS — R07.9 CHEST PAIN, UNSPECIFIED TYPE: ICD-10-CM

## 2018-03-05 DIAGNOSIS — E78.00 PURE HYPERCHOLESTEROLEMIA: Chronic | ICD-10-CM

## 2018-03-05 DIAGNOSIS — J44.9 CHRONIC OBSTRUCTIVE PULMONARY DISEASE, UNSPECIFIED COPD TYPE (HCC): ICD-10-CM

## 2018-03-05 DIAGNOSIS — L30.9 DERMATITIS: ICD-10-CM

## 2018-03-05 DIAGNOSIS — E55.9 VITAMIN D DEFICIENCY: ICD-10-CM

## 2018-03-05 DIAGNOSIS — E11.9 TYPE 2 DIABETES MELLITUS WITHOUT COMPLICATION, WITHOUT LONG-TERM CURRENT USE OF INSULIN (HCC): Primary | ICD-10-CM

## 2018-03-05 DIAGNOSIS — M25.522 LEFT ELBOW PAIN: ICD-10-CM

## 2018-03-05 DIAGNOSIS — I10 ESSENTIAL HYPERTENSION: ICD-10-CM

## 2018-03-05 PROBLEM — E11.21 TYPE 2 DIABETES WITH NEPHROPATHY (HCC): Status: ACTIVE | Noted: 2018-03-05

## 2018-03-05 RX ORDER — AMMONIUM LACTATE 12 G/100G
LOTION TOPICAL
Qty: 400 ML | Refills: 2 | Status: SHIPPED | OUTPATIENT
Start: 2018-03-05 | End: 2021-11-23 | Stop reason: SDUPTHER

## 2018-03-05 RX ORDER — DICLOFENAC SODIUM 10 MG/G
GEL TOPICAL
Qty: 100 G | Refills: 1 | Status: SHIPPED | OUTPATIENT
Start: 2018-03-05 | End: 2018-05-13 | Stop reason: SDUPTHER

## 2018-03-05 NOTE — MR AVS SNAPSHOT
1111 [facility] 102 1400 [address] [place] [address] 
[phone] Patient: Amina Gaona MRN: [id number] STA:1[birth year]5 Visit Information Date & Time Provider Department Dept. Phone Encounter #  
 3/5/2018  1:00 PM Jose Mitchell, 607 [facility] Internal Medicine [phone] [id number] Upcoming Health Maintenance Date Due Pneumococcal 19-64 Medium Risk (1 of 1 - PPSV23) 7/7/1972 DTaP/Tdap/Td series (1 - Tdap) 7/7/1974 PAP AKA CERVICAL CYTOLOGY 7/7/1974 ZOSTER VACCINE AGE 60> 5/7/2013 FOOT EXAM Q1 6/8/2016 EYE EXAM RETINAL OR DILATED Q1 7/29/2017 HEMOGLOBIN A1C Q6M 4/25/2018 MICROALBUMIN Q1 6/28/2018 LIPID PANEL Q1 10/25/2018 BREAST CANCER SCRN MAMMOGRAM 6/22/2019 COLONOSCOPY 10/25/2027 Allergies as of 3/5/2018  Review Complete On: 3/5/2018 By: Jose Mitchell MD  
  
 Severity Noted Reaction Type Reactions Pcn [Penicillins]  02/25/2011    Rash Pcn [Penicillins]  04/12/2011    Hives Current Immunizations  Reviewed on 3/5/2018 Name Date Pneumococcal Conjugate (PCV-13) 8/10/2016 Reviewed by Jose Mitchell MD on 3/5/2018 at  2:01 PM  
You Were Diagnosed With   
  
 Codes Comments Type 2 diabetes mellitus without complication, without long-term current use of insulin (HCC)    -  Primary ICD-10-CM: E11.9 ICD-9-CM: 250.00 Chronic obstructive pulmonary disease, unspecified COPD type ([address] [facility]     ICD-10-CM: J44.9 ICD-9-CM: 078 Pure hypercholesterolemia     ICD-10-CM: E78.00 ICD-9-CM: 272.0 Essential hypertension     ICD-10-CM: I10 
ICD-9-CM: 401.9 Left elbow pain     ICD-10-CM: C34.154 ICD-9-CM: 719.42 Vitamin D deficiency     ICD-10-CM: E55.9 ICD-9-CM: 268.9 Chest pain, unspecified type     ICD-10-CM: R07.9 ICD-9-CM: 786.50 Keratosis pilaris     ICD-10-CM: L85.8 ICD-9-CM: 757.39 Dermatitis     ICD-10-CM: L30.9 ICD-9-CM: 692.9 Vitals BP Pulse Temp Resp Height(growth percentile) Weight(growth percentile) 148/69 (BP 1 Location: Left arm, BP Patient Position: Sitting) 60 98.3 °F (36.8 °C) (Oral) 20 5' 1\" (1.549 m) 184 lb (83.5 kg) SpO2 BMI OB Status Smoking Status 99% 34.77 kg/m2 Postmenopausal Current Every Day Smoker Vitals History BMI and BSA Data Body Mass Index Body Surface Area 34.77 kg/m 2 1.9 m 2 Preferred Pharmacy Pharmacy Name Phone [facility]/PHARMACY #[id number]- [place], [place] - [address] [phone] Your Updated Medication List  
  
   
This list is accurate as of 3/5/18  2:31 PM.  Always use your most recent med list. amLODIPine 10 mg tablet Commonly known as:  Jillyn Boast TAKE 1 TABLET BY MOUTH EVERY DAY  
  
 ammonium lactate 12 % lotion Commonly known as:  LAC-HYDRIN  
rub in to affected area well  
  
 aspirin delayed-release 81 mg tablet Take  by mouth daily. Blood-Glucose Meter monitoring kit Check sugar QD  
  
 diclofenac 1 % Gel Commonly known as:  VOLTAREN Apply  to affected area two (2) times daily as needed. famotidine 20 mg tablet Commonly known as:  PEPCID Take 1 Tab by mouth nightly. FLUoxetine 20 mg capsule Commonly known as:  PROzac TAKE 1 TABLET BY MOUTH EVERY MORNING. Lancets Misc Check blood sugar one time a day  
  
 losartan 100 mg tablet Commonly known as:  COZAAR  
TAKE 1 TABLET EVERY DAY  
  
 metFORMIN 500 mg tablet Commonly known as:  GLUCOPHAGE  
TAKE 1 TABLET BY MOUTH TWO (2) TIMES DAILY (WITH MEALS). metoprolol tartrate 50 mg tablet Commonly known as:  LOPRESSOR  
TAKE 1 TABLET TWICE A DAY  
  
 ONETOUCH ULTRA TEST strip Generic drug:  glucose blood VI test strips CHECK BLOOD SUGAR DAILY polyethylene glycol 17 gram/dose powder Commonly known as:  Marylu Farrier TAKE 17 G BY MOUTH DAILY. SENNA PLUS 8.6-50 mg per tablet Generic drug:  senna-docusate TAKE 1 TABLET EVERY DAY  
  
 simvastatin 20 mg tablet Commonly known as:  ZOCOR  
TAKE 1 TABLET BY MOUTH NIGHTLY  
  
 tiotropium 18 mcg inhalation capsule Commonly known as:  Declan Poplin Take 1 Cap by inhalation daily. TRILEPTAL PO Take 600 mg by mouth daily. VITAMIN D3 2,000 unit Tab Generic drug:  cholecalciferol (vitamin D3) Take  by mouth. Prescriptions Sent to Pharmacy Refills  
 ammonium lactate (LAC-HYDRIN) 12 % lotion 2 Sig: rub in to affected area well Class: Normal  
 Pharmacy: [facility]/pharmacy [facility] [address] Ph #: [phone]  
 diclofenac (VOLTAREN) 1 % gel 1 Sig: Apply  to affected area two (2) times daily as needed. Class: Normal  
 Pharmacy: [facility]/pharmacy [facility] [address] Ph #: [phone] Route: Topical  
  
We Performed the Following AMB POC EKG ROUTINE W/ 12 LEADS, INTER & REP [38760 CPT(R)] HEMOGLOBIN A1C WITH EAG [69012 CPT(R)] METABOLIC PANEL, COMPREHENSIVE [28218 CPT(R)] MICROALBUMIN, UR, RAND [id number] CPT(R)] REFERRAL TO CARDIOLOGY [QZZ37 Custom] REFERRAL TO OPHTHALMOLOGY [REF57 Custom] VITAMIN D, 25 HYDROXY [id number] CPT(R)] Referral Information Referral ID Referred By Referred To  
  
 [id number] Selvin SEGURA [address] [place] [address] [phone] Visits Status Start Date End Date 1 New Request 3/5/18 3/5/19 If your referral has a status of pending review or denied, additional information will be sent to support the outcome of this decision. Referral ID Referred By Referred To  
 [id number] Farzana SEGURA MD  
   [address] [place] [address] [address] [place], [address] Phone: [phone] Fax: [phone] Visits Status Start Date End Date 1 New Request 3/5/18 3/5/19 If your referral has a status of pending review or denied, additional information will be sent to support the outcome of this decision. Introducing [facility] SERVICES! Dear Anand Roldan: Thank you for requesting a PingThings account. Our records indicate that you have previously registered for a PingThings account but its currently inactive. Please call our PingThings support line at [phone]. Additional Information If you have questions, please visit the Frequently Asked Questions section of the PingThings website at [email or web address] Remember, PingThings is NOT to be used for urgent needs. For medical emergencies, dial 911. Now available from your iPhone and Android! Please provide this summary of care documentation to your next provider. Your primary care clinician is listed as [facility] Nurse Navigator. If you have any questions after today's visit, please call [phone].

## 2018-03-05 NOTE — PROGRESS NOTES
Health Maintenance Due   Topic Date Due    Pneumococcal 19-64 Medium Risk (1 of 1 - PPSV23) 07/07/1972    DTaP/Tdap/Td series (1 - Tdap) 07/07/1974    PAP AKA CERVICAL CYTOLOGY  07/07/1974    ZOSTER VACCINE AGE 60>  05/07/2013    FOOT EXAM Q1  06/08/2016    EYE EXAM RETINAL OR DILATED Q1  07/29/2017       Chief Complaint   Patient presents with    Hypertension    Diabetes    Bipolar    Elbow Pain     left elbow    Nicotine Dependence       1. Have you been to the ER, urgent care clinic since your last visit? Hospitalized since your last visit? No    2. Have you seen or consulted any other health care providers outside of the [address] [place] [address] since your last visit? Include any pap smears or colon screening. No    3) Do you have an Advance Directive on file? no    4) Are you interested in receiving information on Advance Directives? NO      Patient is accompanied by self I have received verbal consent from Ivone Jean Baptiste to discuss any/all medical information while they are present in the room.

## 2018-03-05 NOTE — PROGRESS NOTES
HISTORY OF PRESENT ILLNESS  Emeterio Hillman is a 57 y.o. female here for follow up. Report left-sided chest pain on and off. Pain is not reproducible. No palpitation or shortness of breath. She is a smoker for years. not able to quit. Last chest x-ray was normal.  Doing well with all meds. BP seems OK.has COPD.use inhaler,stable. Has occasional cough. Report left elbow pain on and off. No fall or injury. Has dry skin, need lotion. Has diabetes. watching diet. She has bipolar disorder. seeing psych. stable. Need lab work. Hypertension    Pertinent negatives include no chest pain, no palpitations and no blurred vision. Diabetes   Pertinent negatives include no chest pain. Bipolar   Pertinent negatives include no chest pain. Elbow Pain      Nicotine Dependence   Pertinent negatives include no chest pain. COPD   Pertinent negatives include no chest pain. Mental Health Problem   Pertinent negatives include no chest pain. Review of Systems   Constitutional: Negative. Negative for chills and fever. HENT: Negative. Eyes: Negative. Negative for blurred vision and double vision. Respiratory: Positive for cough. Cardiovascular: Negative. Negative for chest pain and palpitations. Gastrointestinal: Positive for constipation. Genitourinary: Negative. Musculoskeletal: Negative. Neurological: Negative. Psychiatric/Behavioral: Negative. Physical Exam   Constitutional: She appears well-developed and well-nourished. No distress. HENT:   Head: Normocephalic and atraumatic. Right Ear: External ear normal.   Left Ear: External ear normal.   Nose: Nose normal.   Mouth/Throat: Oropharynx is clear and moist. No oropharyngeal exudate. Neck: Normal range of motion. Neck supple. No JVD present. No thyromegaly present. Cardiovascular: Normal rate, regular rhythm, normal heart sounds and intact distal pulses.     Pulmonary/Chest: Effort normal and breath sounds normal. No respiratory distress. She has no wheezes. Musculoskeletal: She exhibits no edema or tenderness. Psychiatric: She has a normal mood and affect. Her behavior is normal.       ASSESSMENT and PLAN    Diagnoses and all orders for this visit:    1. Type 2 diabetes mellitus without complication, without long-term current use of insulin (HCC)    Stable. On medicine. Will check,  -     METABOLIC PANEL, COMPREHENSIVE  -     HEMOGLOBIN A1C WITH EAG  -     MICROALBUMIN, UR, RAND  -     REFERRAL TO OPHTHALMOLOGY    2. Chronic obstructive pulmonary disease, unspecified COPD type ([facility]    On Spiriva and albuterol inhaler as needed. Stable. Advised to quit smoking. 3. Pure hypercholesterolemia    Stable. Will check,  -     METABOLIC PANEL, COMPREHENSIVE    4. Essential hypertension    Stable on current regimen. .  Will continue same.  -     METABOLIC PANEL, COMPREHENSIVE  -     REFERRAL TO CARDIOLOGY    5. Left elbow pain    We will give,  -     diclofenac (VOLTAREN) 1 % gel; Apply  to affected area two (2) times daily as needed. 6. Vitamin D deficiency  -     VITAMIN D, 25 HYDROXY    7. Chest pain, unspecified type    Will check,  -     AMB POC EKG ROUTINE W/ 12 LEADS, INTER & REP left bundle branch block. No ST-T wave changes. Will refer,  -     REFERRAL TO CARDIOLOGY    8. Keratosis pilaris  -     ammonium lactate (LAC-HYDRIN) 12 % lotion; rub in to affected area well    9. Dermatitis  -     ammonium lactate (LAC-HYDRIN) 12 % lotion; rub in to affected area well            Discussed expected course/resolution/complications of diagnosis in detail with patient. Medication risks/benefits/costs/interactions/alternatives discussed with patient. Pt was given an after visit summary which includes diagnoses, current medications & vitals. Pt expressed understanding with the diagnosis and plan.

## 2018-03-06 LAB
25(OH)D3+25(OH)D2 SERPL-MCNC: 22.5 NG/ML (ref 30–100)
ALBUMIN SERPL-MCNC: 4.6 G/DL (ref 3.6–4.8)
ALBUMIN/GLOB SERPL: 1.6 {RATIO} (ref 1.2–2.2)
ALP SERPL-CCNC: 102 IU/L (ref 39–117)
ALT SERPL-CCNC: 13 IU/L (ref 0–32)
AST SERPL-CCNC: 15 IU/L (ref 0–40)
BILIRUB SERPL-MCNC: 0.2 MG/DL (ref 0–1.2)
BUN SERPL-MCNC: 7 MG/DL (ref 8–27)
BUN/CREAT SERPL: 12 (ref 12–28)
CALCIUM SERPL-MCNC: 9.6 MG/DL (ref 8.7–10.3)
CHLORIDE SERPL-SCNC: 91 MMOL/L (ref 96–106)
CO2 SERPL-SCNC: 18 MMOL/L (ref 18–29)
CREAT SERPL-MCNC: 0.59 MG/DL (ref 0.57–1)
EST. AVERAGE GLUCOSE BLD GHB EST-MCNC: 131 MG/DL
GFR SERPLBLD CREATININE-BSD FMLA CKD-EPI: 112 ML/MIN/1.73
GFR SERPLBLD CREATININE-BSD FMLA CKD-EPI: 97 ML/MIN/1.73
GLOBULIN SER CALC-MCNC: 2.9 G/DL (ref 1.5–4.5)
GLUCOSE SERPL-MCNC: 136 MG/DL (ref 65–99)
HBA1C MFR BLD: 6.2 % (ref 4.8–5.6)
MICROALBUMIN UR-MCNC: 137.6 UG/ML
POTASSIUM SERPL-SCNC: 4.9 MMOL/L (ref 3.5–5.2)
PROT SERPL-MCNC: 7.5 G/DL (ref 6–8.5)
SODIUM SERPL-SCNC: 128 MMOL/L (ref 134–144)

## 2018-03-07 ENCOUNTER — TELEPHONE (OUTPATIENT)
Dept: INTERNAL MEDICINE CLINIC | Age: 63
End: 2018-03-07

## 2018-03-07 DIAGNOSIS — E87.1 HYPONATREMIA: Primary | ICD-10-CM

## 2018-03-07 DIAGNOSIS — F31.77 BIPOLAR DISORDER, IN PARTIAL REMISSION, MOST RECENT EPISODE MIXED (HCC): ICD-10-CM

## 2018-03-07 RX ORDER — BUPROPION HYDROCHLORIDE 150 MG/1
150 TABLET ORAL
Qty: 30 TAB | Refills: 3 | Status: SHIPPED | OUTPATIENT
Start: 2018-03-07 | End: 2018-05-10 | Stop reason: SDUPTHER

## 2018-03-07 NOTE — TELEPHONE ENCOUNTER
Ms. Rina Cornell called and said she received a call from the office. I didn't find any documentation, so I took her number down. Could have been an automated call. ..

## 2018-03-07 NOTE — PROGRESS NOTES
Low sodium level. Probably secondary to Prozac. Will wean off Prozac to every other day for 2 days and every 32 days and then stop it. We will start Wellbutrin  mg once a day. She states she has a psychiatric she needs to follow-up with her and mention about low sodium. vit D level very low.will start on vit D 50,000 unit 1 cap weekly for 4 months. will repeat level in 4 months. adv to be on milk product and expose to sun for 20 min a day. Repeat sodium in 1 month.

## 2018-03-22 ENCOUNTER — OFFICE VISIT (OUTPATIENT)
Dept: CARDIOLOGY CLINIC | Age: 63
End: 2018-03-22

## 2018-03-22 VITALS
WEIGHT: 183.8 LBS | HEART RATE: 60 BPM | HEIGHT: 61 IN | DIASTOLIC BLOOD PRESSURE: 70 MMHG | SYSTOLIC BLOOD PRESSURE: 110 MMHG | OXYGEN SATURATION: 97 % | BODY MASS INDEX: 34.7 KG/M2

## 2018-03-22 DIAGNOSIS — E78.2 MIXED HYPERLIPIDEMIA: ICD-10-CM

## 2018-03-22 DIAGNOSIS — Z72.0 TOBACCO USE: ICD-10-CM

## 2018-03-22 DIAGNOSIS — I44.7 LBBB (LEFT BUNDLE BRANCH BLOCK): ICD-10-CM

## 2018-03-22 DIAGNOSIS — J44.9 CHRONIC OBSTRUCTIVE PULMONARY DISEASE, UNSPECIFIED COPD TYPE (HCC): ICD-10-CM

## 2018-03-22 DIAGNOSIS — I10 BENIGN ESSENTIAL HTN: ICD-10-CM

## 2018-03-22 DIAGNOSIS — E11.8 DM TYPE 2, CONTROLLED, WITH COMPLICATION (HCC): ICD-10-CM

## 2018-03-22 DIAGNOSIS — I20.0 UNSTABLE ANGINA (HCC): Primary | ICD-10-CM

## 2018-03-22 RX ORDER — METFORMIN HYDROCHLORIDE 500 MG/1
TABLET ORAL
Qty: 180 TAB | Refills: 1 | Status: SHIPPED | OUTPATIENT
Start: 2018-03-22 | End: 2018-09-22 | Stop reason: SDUPTHER

## 2018-03-22 NOTE — MR AVS SNAPSHOT
[address] [place] [address] 
[phone] Patient: Constance Galvin MRN: [id number] [facility]:7[birth year]5/[birth year]44 Visit Information Date & Time Provider Department Dept. Phone Encounter #  
 3/22/2018 11:40 AM Federico Ta MD [facility] [place] [phone] [id number] Your Appointments 3/28/2018  1:00 PM  
NUCLEAR MEDICINE with ROSELIA CRISOSTOMO [facility] ([facility]JI SCHEDULING) Appt Note: 1 day Lexiscan for unstable angina ht 5'1 wt 183 per Dr. Negrete Can [address] [facility] [address]  
[address] [address]  
  
    
 7/9/2018  1:30 PM  
ROUTINE CARE with Maldonado Crowe MD  
[facility]) Appt Note: 4 month f/u  
 [address] [facility] [address] [place] [address] [facility] [address]  
[phone]  
  
   
 [address] [facility] [address] [facility] Upcoming Health Maintenance Date Due Pneumococcal 19-64 Medium Risk (1 of 1 - PPSV23) 7/7/1972 DTaP/Tdap/Td series (1 - Tdap) 7/7/1974 PAP AKA CERVICAL CYTOLOGY 7/7/1974 ZOSTER VACCINE AGE 60> 5/7/2013 FOOT EXAM Q1 6/8/2016 EYE EXAM RETINAL OR DILATED Q1 7/29/2017 HEMOGLOBIN A1C Q6M 9/5/2018 LIPID PANEL Q1 10/25/2018 MICROALBUMIN Q1 3/5/2019 BREAST CANCER SCRN MAMMOGRAM 6/22/2019 COLONOSCOPY 10/25/2027 Allergies as of 3/22/2018  Review Complete On: 3/22/2018 By: Federico Ta MD  
  
 Severity Noted Reaction Type Reactions Pcn [Penicillins]  02/25/2011    Rash Pcn [Penicillins]  04/12/2011    Hives Current Immunizations  Reviewed on 3/5/2018 Name Date Pneumococcal Conjugate (PCV-13) 8/10/2016 Not reviewed this visit You Were Diagnosed With   
  
 Codes Comments Unstable angina (HCC)    -  Primary ICD-10-CM: I20.0 ICD-9-CM: 411.1 Benign essential HTN     ICD-10-CM: I10 
ICD-9-CM: 401.1 Mixed hyperlipidemia     ICD-10-CM: E78.2 ICD-9-CM: 272.2 DM type 2, controlled, with complication ([facility]     WUJ-32-HD: E11.8 ICD-9-CM: 250.90 Tobacco use     ICD-10-CM: Z72.0 ICD-9-CM: 305.1 Chronic obstructive pulmonary disease, unspecified COPD type ([facility]     ICD-10-CM: J44.9 ICD-9-CM: 530 Vitals BP Pulse Height(growth percentile) Weight(growth percentile) SpO2 BMI  
 110/70 (BP 1 Location: Left arm, BP Patient Position: Sitting) 60 5' 1\" (1.549 m) 183 lb 12.8 oz (83.4 kg) 97% 34.73 kg/m2 OB Status Smoking Status Postmenopausal Current Every Day Smoker Vitals History BMI and BSA Data Body Mass Index Body Surface Area 34.73 kg/m 2 1.89 m 2 Preferred Pharmacy Pharmacy Name Phone [facility]/PHARMACY #[id number]- [place], [place] - [address] [phone] Your Updated Medication List  
  
   
This list is accurate as of 3/22/18 11:55 AM.  Always use your most recent med list. amLODIPine 10 mg tablet Commonly known as:  Abby Rosas TAKE 1 TABLET BY MOUTH EVERY DAY  
  
 ammonium lactate 12 % lotion Commonly known as:  LAC-HYDRIN  
rub in to affected area well  
  
 aspirin delayed-release 81 mg tablet Take  by mouth daily. Blood-Glucose Meter monitoring kit Check sugar QD  
  
 buPROPion  mg tablet Commonly known as:  Loralyn Fujita Take 1 Tab by mouth every morning. D/C prozac  
  
 diclofenac 1 % Gel Commonly known as:  VOLTAREN Apply  to affected area two (2) times daily as needed. famotidine 20 mg tablet Commonly known as:  PEPCID Take 1 Tab by mouth nightly. FLUoxetine 20 mg capsule Commonly known as:  PROzac TAKE 1 TABLET BY MOUTH EVERY MORNING. Lancets Misc Check blood sugar one time a day  
  
 losartan 100 mg tablet Commonly known as:  COZAAR  
TAKE 1 TABLET EVERY DAY  
  
 metFORMIN 500 mg tablet Commonly known as:  GLUCOPHAGE  
TAKE 1 TABLET BY MOUTH TWO (2) TIMES DAILY (WITH MEALS). metoprolol tartrate 50 mg tablet Commonly known as:  LOPRESSOR  
TAKE 1 TABLET TWICE A DAY  
  
 ONETOUCH ULTRA TEST strip Generic drug:  glucose blood VI test strips CHECK BLOOD SUGAR DAILY polyethylene glycol 17 gram/dose powder Commonly known as:  Kim Canter TAKE 17 G BY MOUTH DAILY. SENNA PLUS 8.6-50 mg per tablet Generic drug:  senna-docusate TAKE 1 TABLET EVERY DAY  
  
 simvastatin 20 mg tablet Commonly known as:  ZOCOR  
TAKE 1 TABLET BY MOUTH NIGHTLY  
  
 tiotropium 18 mcg inhalation capsule Commonly known as:  Maryella Perez Take 1 Cap by inhalation daily. TRILEPTAL PO Take 600 mg by mouth daily. VITAMIN D3 2,000 unit Tab Generic drug:  cholecalciferol (vitamin D3) Take  by mouth. Introducing [facility] & HEALTH SERVICES! Dear Daniel Cornell: Thank you for requesting a Brainient account. Our records indicate that you have previously registered for a Brainient account but its currently inactive. Please call our Brainient support line at [phone]. Additional Information If you have questions, please visit the Frequently Asked Questions section of the Brainient website at [email or web address] Remember, Brainient is NOT to be used for urgent needs. For medical emergencies, dial 911. Now available from your iPhone and Android! Please provide this summary of care documentation to your next provider. Your primary care clinician is listed as [facility] Nurse Navigator. If you have any questions after today's visit, please call [phone].

## 2018-03-22 NOTE — PROGRESS NOTES
[place] [address]: [place]  [phone]    History of Present Illness:   Ms. Therese Gongora is a 56 yo F with essential hypertension, mixed hyperlipidemia, tobacco use with COPD, left bundle-branch block, referred by Dr. Ammon Knight for cardiac evaluation. She is here now due to recent chest pains. They have been on and off for the last six months, substernal, lasting less than a minute, can happen with rest or exertion. Her breathing overall has been stable with regard to her COPD. She denies any significant palpitations, lightheadedness, dizziness or syncope. EKG from 03/05/2018 I reviewed personally was sinus bradycardia, left bundle-branch block. Of note, her left bundle has been noted by EKG as far back as 2011. Soc hx. Tobacco 1 ppd. Retired, [employer]. Single, no children. Lives with sister. Fam hx. Mom MI few yrs, 62s CAD. Assessment and Plan:   1. Unstable angina. Chest pain with typical and atypical features with multiple risk factors; will proceed with a stress test for further evaluation. Due to her left bundle, will do this Lexiscan. 2. Tobacco use. Encouraged cessation. 3. COPD. 4. Type 2 diabetes. 5. Essential hypertension. Blood pressure is controlled and no changes made. 6. Mixed hyperlipidemia. Tolerating statin. She  has a past medical history of COPD; Depression; Diabetes ([address] Diabetes ([address] Hypercholesterolemia; Hypertension; LBBB (left bundle branch block); Psychiatric disorder; Renal cyst, right; and Thyroid disease. All other systems negative except as above. PE  Vitals:    03/22/18 1127   BP: 110/70   Pulse: 60   SpO2: 97%   Weight: 183 lb 12.8 oz (83.4 kg)   Height: 5' 1\" (1.549 m)    Body mass index is 34.73 kg/(m^2).    General appearance - alert, well appearing, and in no distress  Mental status - affect appropriate to mood  Eyes - sclera anicteric, moist mucous membranes  Neck - supple, no JVD  Chest - clear to auscultation, no wheezes, rales or rhonchi  Heart - normal rate, regular rhythm, normal S1, S2, no murmurs, rubs, clicks or gallops  Abdomen - soft, nontender, nondistended, no masses or organomegaly  Neurological -no focal deficit  Extremities - peripheral pulses normal, no pedal edema      Recent Labs:  Lab Results   Component Value Date/Time    Cholesterol, total 185 10/25/2017 01:58 PM    HDL Cholesterol 92 10/25/2017 01:58 PM    LDL, calculated 65 10/25/2017 01:58 PM    Triglyceride 140 10/25/2017 01:58 PM     Lab Results   Component Value Date/Time    Creatinine 0.59 03/05/2018 02:55 PM     Lab Results   Component Value Date/Time    BUN 7 (L) 03/05/2018 02:55 PM     Lab Results   Component Value Date/Time    Potassium 4.9 03/05/2018 02:55 PM     Lab Results   Component Value Date/Time    Hemoglobin A1c 6.2 (H) 03/05/2018 02:55 PM     Lab Results   Component Value Date/Time    HGB 14.8 06/28/2017 01:31 PM     Lab Results   Component Value Date/Time    PLATELET 749 78/12/9409 01:31 PM       Reviewed:  Past Medical History:   Diagnosis Date    COPD     early stage of emphysema    Depression     bipolar disease    Diabetes ([facility]     Diabetes ([facility]     for 15 years    Hypercholesterolemia     Hypertension     LBBB (left bundle branch block)     Psychiatric disorder     BIpolar    Renal cyst, right     left kidney in pelvis    Thyroid disease     thyroid nodule await for biopsy     History   Smoking Status    Current Every Day Smoker    Packs/day: 1.00    Years: 30.00    Types: Cigarettes   Smokeless Tobacco    Never Used     History   Alcohol Use No     Allergies   Allergen Reactions    Pcn [Penicillins] Rash    Pcn [Penicillins] Hives       Current Outpatient Prescriptions   Medication Sig    metFORMIN (GLUCOPHAGE) 500 mg tablet TAKE 1 TABLET BY MOUTH TWO (2) TIMES DAILY (WITH MEALS).  buPROPion XL (WELLBUTRIN XL) 150 mg tablet Take 1 Tab by mouth every morning.  D/C prozac    ammonium lactate (LAC-HYDRIN) 12 % lotion rub in to affected area well    diclofenac (VOLTAREN) 1 % gel Apply  to affected area two (2) times daily as needed.  polyethylene glycol (MIRALAX) 17 gram/dose powder TAKE 17 G BY MOUTH DAILY.  SENNA PLUS 8.6-50 mg per tablet TAKE 1 TABLET EVERY DAY    simvastatin (ZOCOR) 20 mg tablet TAKE 1 TABLET BY MOUTH NIGHTLY    losartan (COZAAR) 100 mg tablet TAKE 1 TABLET EVERY DAY    tiotropium (SPIRIVA) 18 mcg inhalation capsule Take 1 Cap by inhalation daily.  ONETOUCH ULTRA TEST strip CHECK BLOOD SUGAR DAILY    metoprolol tartrate (LOPRESSOR) 50 mg tablet TAKE 1 TABLET TWICE A DAY    cholecalciferol, vitamin D3, (VITAMIN D3) 2,000 unit tab Take  by mouth.  FLUoxetine (PROZAC) 20 mg capsule TAKE 1 TABLET BY MOUTH EVERY MORNING.  famotidine (PEPCID) 20 mg tablet Take 1 Tab by mouth nightly.  Lancets misc Check blood sugar one time a day    Blood-Glucose Meter monitoring kit Check sugar QD    aspirin delayed-release 81 mg tablet Take  by mouth daily.  OXCARBAZEPINE (TRILEPTAL PO) Take 600 mg by mouth daily.  amLODIPine (NORVASC) 10 mg tablet TAKE 1 TABLET BY MOUTH EVERY DAY     No current facility-administered medications for this visit.         Chidi Flynn MD  [facility] heart and Vascular [place]  [address] [facility]  [address]

## 2018-03-28 ENCOUNTER — TELEPHONE (OUTPATIENT)
Dept: CARDIOLOGY CLINIC | Age: 63
End: 2018-03-28

## 2018-03-28 ENCOUNTER — CLINICAL SUPPORT (OUTPATIENT)
Dept: CARDIOLOGY CLINIC | Age: 63
End: 2018-03-28

## 2018-03-28 DIAGNOSIS — E78.2 MIXED HYPERLIPIDEMIA: ICD-10-CM

## 2018-03-28 DIAGNOSIS — E11.8 DM TYPE 2, CONTROLLED, WITH COMPLICATION (HCC): ICD-10-CM

## 2018-03-28 DIAGNOSIS — R07.9 CHEST PAIN, UNSPECIFIED TYPE: ICD-10-CM

## 2018-03-28 DIAGNOSIS — R94.31 ABNORMAL EKG: ICD-10-CM

## 2018-03-28 DIAGNOSIS — Z72.0 TOBACCO USE: ICD-10-CM

## 2018-03-28 DIAGNOSIS — J44.9 CHRONIC OBSTRUCTIVE PULMONARY DISEASE, UNSPECIFIED COPD TYPE (HCC): ICD-10-CM

## 2018-03-28 DIAGNOSIS — I44.7 LBBB (LEFT BUNDLE BRANCH BLOCK): ICD-10-CM

## 2018-03-28 DIAGNOSIS — I10 BENIGN ESSENTIAL HTN: ICD-10-CM

## 2018-03-28 DIAGNOSIS — I20.0 UNSTABLE ANGINA (HCC): ICD-10-CM

## 2018-03-28 NOTE — TELEPHONE ENCOUNTER
The following nuclear stress test result given to patient per Dr. Laxmi Roque.  2 pt identifiers used      Impression        Please let pt know stress test was normal.

## 2018-03-28 NOTE — PROGRESS NOTES
See scanned document. Ordering Doctor:Dr. Shannan Barksdale  Reading Doctor:Dr. Shannan Barksdale    Patient tolerated procedure well.

## 2018-03-30 RX ORDER — METOPROLOL TARTRATE 50 MG/1
TABLET ORAL
Qty: 180 TAB | Refills: 1 | Status: SHIPPED | OUTPATIENT
Start: 2018-03-30 | End: 2018-09-27 | Stop reason: SDUPTHER

## 2018-04-07 DIAGNOSIS — E87.1 HYPONATREMIA: ICD-10-CM

## 2018-04-08 DIAGNOSIS — J41.0 SMOKERS' COUGH (HCC): ICD-10-CM

## 2018-04-09 RX ORDER — TIOTROPIUM BROMIDE 18 UG/1
CAPSULE ORAL; RESPIRATORY (INHALATION)
Qty: 90 CAP | Refills: 1 | Status: SHIPPED | COMMUNITY
Start: 2018-04-09 | End: 2018-10-05 | Stop reason: SDUPTHER

## 2018-05-10 DIAGNOSIS — F31.77 BIPOLAR DISORDER, IN PARTIAL REMISSION, MOST RECENT EPISODE MIXED (HCC): ICD-10-CM

## 2018-05-10 RX ORDER — BUPROPION HYDROCHLORIDE 150 MG/1
150 TABLET ORAL
Qty: 90 TAB | Refills: 1 | Status: SHIPPED | OUTPATIENT
Start: 2018-05-10 | End: 2018-11-02 | Stop reason: SDUPTHER

## 2018-05-13 DIAGNOSIS — M25.522 LEFT ELBOW PAIN: ICD-10-CM

## 2018-05-14 RX ORDER — DICLOFENAC SODIUM 10 MG/G
GEL TOPICAL
Qty: 100 G | Refills: 1 | Status: SHIPPED | OUTPATIENT
Start: 2018-05-14 | End: 2018-08-11 | Stop reason: SDUPTHER

## 2018-06-24 RX ORDER — AMLODIPINE BESYLATE 10 MG/1
TABLET ORAL
Qty: 90 TAB | Refills: 1 | Status: SHIPPED | OUTPATIENT
Start: 2018-06-24 | End: 2018-12-21 | Stop reason: SDUPTHER

## 2018-06-24 RX ORDER — LOSARTAN POTASSIUM 100 MG/1
TABLET ORAL
Qty: 90 TAB | Refills: 1 | Status: SHIPPED | OUTPATIENT
Start: 2018-06-24 | End: 2018-12-21 | Stop reason: SDUPTHER

## 2018-08-02 NOTE — MR AVS SNAPSHOT
Dx: Sprain of anterior cruciate ligament of right knee, initial encounter (F05.846D)  Acute lateral meniscus tear of right knee, initial encounter (M66.348B)            Visit # 19  Fall Risk: standard     Scheduled Visits 10  Precautions: see protocol be Visit Information Date & Time Provider Department Dept. Phone Encounter #  
 6/28/2017  1:15 PM Miriam Nava MD [facility] Internal Medicine [phone] [id number] Upcoming Health Maintenance Date Due Hepatitis C Screening 1955 Pneumococcal 19-64 Medium Risk (1 of 1 - PPSV23) 7/7/1972 DTaP/Tdap/Td series (1 - Tdap) 7/7/1974 PAP AKA CERVICAL CYTOLOGY 7/7/1974 FOBT Q 1 YEAR AGE 50-75 7/7/2003 ZOSTER VACCINE AGE 60> 7/7/2013 FOOT EXAM Q1 6/8/2016 HEMOGLOBIN A1C Q6M 2/10/2017 MICROALBUMIN Q1 4/11/2017 EYE EXAM RETINAL OR DILATED Q1 7/29/2017 INFLUENZA AGE 9 TO ADULT 8/1/2017 LIPID PANEL Q1 11/30/2017 BREAST CANCER SCRN MAMMOGRAM 6/22/2019 Allergies as of 6/28/2017  Review Complete On: 6/28/2017 By: Miriam Nava MD  
  
 Severity Noted Reaction Type Reactions Pcn [Penicillins]  02/25/2011    Rash Pcn [Penicillins]  04/12/2011    Hives Current Immunizations  Reviewed on 6/28/2017 Name Date Pneumococcal Conjugate (PCV-13) 8/10/2016 Reviewed by Miriam Nava MD on 6/28/2017 at  1:19 PM  
You Were Diagnosed With   
  
 Codes Comments Type 2 diabetes mellitus without complication, without long-term current use of insulin (HCC)    -  Primary ICD-10-CM: E11.9 ICD-9-CM: 250.00 Chronic obstructive pulmonary disease, unspecified COPD type ([facility]     ICD-10-CM: J44.9 ICD-9-CM: 101 Essential hypertension     ICD-10-CM: I10 
ICD-9-CM: 401.9 Menopause     ICD-10-CM: Z78.0 ICD-9-CM: 627.2 Vitals BP Pulse Temp Resp Height(growth percentile) Weight(growth percentile) 142/65 (BP 1 Location: Right arm, BP Patient Position: Sitting) 62 98.4 °F (36.9 °C) (Oral) 17 5' 1\" (1.549 m) 188 lb 9.6 oz (85.5 kg) SpO2 BMI OB Status Smoking Status 95% 35.64 kg/m2 Postmenopausal Current Every Day Smoker Vitals History BMI and BSA Data  Body Mass Index Body Surface Area  
 35.64 kg/m 2 1.92 m 2  
  
  
 Preferred Pharmacy Pharmacy Name Phone [facility][id number]- [place], [place] - [address] [phone] Your Updated Medication List  
  
   
This list is accurate as of: 6/28/17  1:19 PM.  Always use your most recent med list. amLODIPine 10 mg tablet Commonly known as:  [place] TAKE 1 TABLET BY MOUTH EVERY DAY  
  
 ammonium lactate 12 % lotion Commonly known as:  LAC-HYDRIN TWELVE  
rub in to affected area well  
  
 aspirin delayed-release 81 mg tablet Take  by mouth daily. Blood-Glucose Meter monitoring kit Check sugar QD  
  
 famotidine 20 mg tablet Commonly known as:  PEPCID Take 1 Tab by mouth nightly. FLUoxetine 20 mg capsule Commonly known as:  PROzac TAKE 1 TABLET BY MOUTH EVERY MORNING. glucose blood VI test strips strip Commonly known as:  ASCENSIA AUTODISC VI, ONE TOUCH ULTRA TEST VI Check BS QD Lancets Misc Check blood sugar one time a day  
  
 losartan 100 mg tablet Commonly known as:  COZAAR  
TAKE 1 TABLET EVERY DAY  
  
 metFORMIN 500 mg tablet Commonly known as:  GLUCOPHAGE  
TAKE 1 TABLET BY MOUTH TWO (2) TIMES DAILY (WITH MEALS). metoprolol tartrate 50 mg tablet Commonly known as:  LOPRESSOR  
TAKE 1 TABLET TWICE A DAY  
  
 polyethylene glycol 17 gram/dose powder Commonly known as:  Gwenyth Space Take 17 g by mouth daily. senna-docusate 8.6-50 mg per tablet Commonly known as:  Wava Dun Take 1 Tab by mouth daily. simvastatin 20 mg tablet Commonly known as:  ZOCOR Take 1 Tab by mouth nightly. TRILEPTAL PO Take 600 mg by mouth daily. VITAMIN D3 2,000 unit Tab Generic drug:  cholecalciferol (vitamin D3) Take  by mouth. We Performed the Following CBC W/O DIFF [22891 CPT(R)] HEMOGLOBIN A1C WITH EAG [54990 CPT(R)] METABOLIC PANEL, COMPREHENSIVE [25448 CPT(R)] min    Shuttle B LE 8cords x20  R LE 6 cords x20    Eccentric heel raise on steps x20    Hamstring stretch on step 30sec x3    Bosu step ups to march fwd/lat 7 inch x20    Rebounder with airex 2# R SLS x20     Wall sits 10sec x5    Figure 8's 20ft x4    Thien Nolan MICROALBUMIN, UR, RAND W/ MICROALBUMIN/CREA RATIO [id number] CPT(R)] REFERRAL TO OPHTHALMOLOGY [REF57 Custom] Comments:  
 Please evaluate patient for DM To-Do List   
 09/28/2017 Imaging:  DEXA BONE DENSITY STUDY AXIAL   
  
 12/22/2017 9:00 AM  
  Appointment with [facility] VICKIE 1 at [facility] ([phone]) Shower or bathe using soap and water. Do not use deodorant, powder, perfumes, or lotion the day of your exam.  If your prior mammograms were not performed at [facility] 6 please bring films with you or forward prior images 2 days before your procedure. If patient is not a callback diagnostic, the patient must have an order/script from the physician for the diagnostic. Please bring it on the day of the mammogram or have it faxed to the department. [facility]  [phone] [facility] [place]IE  [phone] [facility] [phone] [facility] [address] [facility] [phone] [facility] [phone] Referral Information Referral ID Referred By Referred To  
  
 [id number] [facility] [address] [place], [address] Visits Status Start Date End Date 1 New Request 6/28/17 6/28/18 If your referral has a status of pending review or denied, additional information will be sent to support the outcome of this decision. Introducing [facility]! Dear Mara Brown: Thank you for requesting a [employer] account. Our records indicate that you have previously registered for a [employer] account but its currently inactive. Please call our [employer] support line at [phone]. Additional Information If you have questions, please visit the Frequently Asked Questions section of the [employer] website at [email or web address] Remember, [employer] is NOT to be used for urgent needs. For medical emergencies, dial 911. Now available from your iPhone and Android! crutches  10. Exercises (out of immobilizer):  a. Quad sets  b. Active assisted knee flexion (sitting)  c. Hamstring stretches  d. Passive extension to 0 degrees  e.  Standing hamstring curls  Discharge Protocol:  1. Gait-weight bear as tolerated with crutc Please provide this summary of care documentation to your next provider. Your primary care clinician is listed as DMA Nurse Navigator. If you have any questions after today's visit, please call [phone]. abduction  * If any of these exercises seem to aggravate the knee (swelling, pain, or  tenderness), then that specific exercise which causes the difficulty should be  postponed until you have discussed the effects of the exercise with us.   Postoperative Se 7/24/18  Goals:  1. Full knee range of motion. Refer back to surgeon for extension  restriction of 5 degrees or if less than 110 degrees flexion. 2. Normal gait pattern. 3. Progressively increasing functional strengthening program.  Exercises:  1.  Contin for  100 yards. Tighten up as strength/endurance permits.   5. Sports on Own:  - Basketball - shooting baskets only  - Rollerblades - level surfaces - no hills- no quick stops, cutting, or  operative leg cross-overs (parking lots)  - Recreational tennis (no

## 2018-08-11 DIAGNOSIS — M25.522 LEFT ELBOW PAIN: ICD-10-CM

## 2018-08-13 ENCOUNTER — OFFICE VISIT (OUTPATIENT)
Dept: INTERNAL MEDICINE CLINIC | Age: 63
End: 2018-08-13

## 2018-08-13 ENCOUNTER — HOSPITAL ENCOUNTER (OUTPATIENT)
Dept: MAMMOGRAPHY | Age: 63
Discharge: HOME | End: 2018-08-13
Attending: INTERNAL MEDICINE
Payer: MEDICARE

## 2018-08-13 VITALS
BODY MASS INDEX: 33.99 KG/M2 | TEMPERATURE: 97.4 F | RESPIRATION RATE: 20 BRPM | OXYGEN SATURATION: 93 % | HEART RATE: 61 BPM | DIASTOLIC BLOOD PRESSURE: 58 MMHG | SYSTOLIC BLOOD PRESSURE: 141 MMHG | HEIGHT: 61 IN | WEIGHT: 180 LBS

## 2018-08-13 DIAGNOSIS — J44.9 CHRONIC OBSTRUCTIVE PULMONARY DISEASE, UNSPECIFIED COPD TYPE (HCC): ICD-10-CM

## 2018-08-13 DIAGNOSIS — Z12.39 SCREENING FOR BREAST CANCER: ICD-10-CM

## 2018-08-13 DIAGNOSIS — Z23 ENCOUNTER FOR IMMUNIZATION: ICD-10-CM

## 2018-08-13 DIAGNOSIS — E11.9 TYPE 2 DIABETES MELLITUS WITHOUT COMPLICATION, WITHOUT LONG-TERM CURRENT USE OF INSULIN (HCC): ICD-10-CM

## 2018-08-13 DIAGNOSIS — E55.9 VITAMIN D DEFICIENCY: ICD-10-CM

## 2018-08-13 DIAGNOSIS — I10 ESSENTIAL HYPERTENSION: ICD-10-CM

## 2018-08-13 DIAGNOSIS — Z28.21 REFUSED PNEUMOCOCCAL VACCINE: ICD-10-CM

## 2018-08-13 DIAGNOSIS — Z71.89 ACP (ADVANCE CARE PLANNING): ICD-10-CM

## 2018-08-13 DIAGNOSIS — Z00.00 WELCOME TO MEDICARE PREVENTIVE VISIT: Primary | ICD-10-CM

## 2018-08-13 DIAGNOSIS — Z78.0 MENOPAUSE: ICD-10-CM

## 2018-08-13 PROCEDURE — 77080 DXA BONE DENSITY AXIAL: CPT

## 2018-08-13 RX ORDER — DICLOFENAC SODIUM 10 MG/G
GEL TOPICAL
Qty: 100 G | Refills: 1 | Status: SHIPPED | OUTPATIENT
Start: 2018-08-13 | End: 2022-10-20 | Stop reason: SDUPTHER

## 2018-08-13 NOTE — PROGRESS NOTES
Eliza Zeng is a 70 y.o. female and presents for annual Medicare Wellness Visit. Problem List: Reviewed with patient and discussed risk factors. 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    Hyponatremia E87.1    LBBB (left bundle branch block) I44.7    Refused pneumococcal vaccine Z28.21       Current medical providers:  Patient Care Team:  Diamond Spears) as PCP - General (Internal Medicine)  Saskia Jain LPN as Ambulatory Care Navigator (Internal Medicine)    PSH: Reviewed with patient  Past Surgical History:   Procedure Laterality Date    BREAST SURGERY PROCEDURE UNLISTED      breast cyst removed from left breast    HX BREAST BIOPSY Right     cyst removed at age 22        SH: Reviewed with patient  Social History   Substance Use Topics    Smoking status: Current Every Day Smoker     Packs/day: 1.00     Years: 30.00     Types: Cigarettes    Smokeless tobacco: Never Used    Alcohol use No      Comment: 1 beer per week. FH: Reviewed with patient  Family History   Problem Relation Age of Onset    Diabetes Mother     Hypertension Mother    Yessenia.Esters Elevated Lipids Mother     Cancer Mother      breast ac    Breast Cancer Mother     Cancer Father      tongue ca    Diabetes Sister     Breast Cancer Sister     Heart Disease Brother        Medications/Allergies: Reviewed with patient  Current Outpatient Prescriptions on File Prior to Visit   Medication Sig Dispense Refill    amLODIPine (NORVASC) 10 mg tablet TAKE 1 TABLET BY MOUTH EVERY DAY 90 Tab 1    buPROPion XL (WELLBUTRIN XL) 150 mg tablet Take 1 Tab by mouth every morning.  [address] [place] WITH HANDIHALER 18 mcg inhalation capsule TAKE 1 CAPSULE BY INHALATION DAILY. 90 Cap 1    metoprolol tartrate (LOPRESSOR) 50 mg tablet TAKE 1 TABLET TWICE A  Tab 1    metFORMIN (GLUCOPHAGE) 500 mg tablet TAKE 1 TABLET BY MOUTH TWO (2) TIMES DAILY (WITH MEALS). 180 Tab 1    ammonium lactate (LAC-HYDRIN) 12 % lotion rub in to affected area well 400 mL 2    polyethylene glycol (MIRALAX) 17 gram/dose powder TAKE 17 G BY MOUTH DAILY. 1054 g 0    SENNA PLUS 8.6-50 mg per tablet TAKE 1 TABLET EVERY DAY 30 Tab 2    simvastatin (ZOCOR) 20 mg tablet TAKE 1 TABLET BY MOUTH NIGHTLY 90 Tab 1    ONETOUCH ULTRA TEST strip CHECK BLOOD SUGAR DAILY 100 Strip 0    cholecalciferol, vitamin D3, (VITAMIN D3) 2,000 unit tab Take  by mouth.  FLUoxetine (PROZAC) 20 mg capsule TAKE 1 TABLET BY MOUTH EVERY MORNING. 90 Cap 1    Lancets misc Check blood sugar one time a day 1 Package 11    Blood-Glucose Meter monitoring kit Check sugar QD 1 Kit 0    aspirin delayed-release 81 mg tablet Take  by mouth daily.  OXCARBAZEPINE (TRILEPTAL PO) Take 600 mg by mouth daily.  diclofenac (VOLTAREN) 1 % gel APPLY TO AFFECTED AREA TWO (2) TIMES DAILY AS NEEDED. 100 g 1    losartan (COZAAR) 100 mg tablet TAKE 1 TABLET EVERY DAY 90 Tab 1    famotidine (PEPCID) 20 mg tablet Take 1 Tab by mouth nightly. 30 Tab 0     No current facility-administered medications on file prior to visit. Allergies   Allergen Reactions    Pcn [Penicillins] Rash    Pcn [Penicillins] Hives       Objective:  Visit Vitals    /58 (BP 1 Location: Left arm, BP Patient Position: Sitting)    Pulse 61    Temp 97.4 °F (36.3 °C) (Oral)    Resp 20    Ht 5' 1\" (1.549 m)    Wt 180 lb (81.6 kg)    SpO2 93%    BMI 34.01 kg/m2    Body mass index is 34.01 kg/(m^2). Assessment of cognitive impairment: Alert and oriented x 3    Depression Screen: No flowsheet data found.     Fall Risk Assessment:    Fall Risk Assessment, last 12 mths 8/13/2018 Able to walk? Yes   Fall in past 12 months? No       Functional Ability:   Does the patient exhibit a steady gait? yes   How long did it take the patient to get up and walk from a sitting position? 10 sec   Is the patient self reliant?  (ie can do own laundry, meals, household chores)  yes     Does the patient handle his/her own medications? yes     Does the patient handle his/her own money? yes     Is the patients home safe (ie good lighting, handrails on stairs and bath, etc.)? yes     Did you notice or did patient express any hearing difficulties? no     Did you notice or did patient express any vision difficulties?   no     Were distance and reading eye charts used? no       Advance Care Planning:   Patient was offered the opportunity to discuss advance care planning:  yes     Does patient have an Advance Directive:  no   If no, did you provide information on Caring Connections? yes       Plan:      Orders Placed This Encounter    VICKIE MAMMO BI SCREENING INCL CAD    REFERRAL TO OPHTHALMOLOGY    AMB POC EKG ROUTINE W/ 12 LEADS, INTER & REP       Health Maintenance   Topic Date Due    DTaP/Tdap/Td series (1 - Tdap) 07/07/1974    ZOSTER VACCINE AGE 60>  05/07/2013    EYE EXAM RETINAL OR DILATED Q1  07/29/2017    Bone Densitometry (Dexa) Screening  07/07/2018    Pneumococcal 65+ Low/Medium Risk (2 of 2 - PPSV23) 07/07/2018    GLAUCOMA SCREENING Q2Y  07/29/2018    Influenza Age 9 to Adult  08/01/2018    HEMOGLOBIN A1C Q6M  09/05/2018    LIPID PANEL Q1  10/25/2018    MICROALBUMIN Q1  03/05/2019    BREAST CANCER SCRN MAMMOGRAM  06/22/2019    FOOT EXAM Q1  08/13/2019    COLONOSCOPY  10/25/2027    Hepatitis C Screening  Completed       *Patient verbalized understanding and agreement with the plan. A copy of the After Visit Summary with personalized health plan was given to the patient today.

## 2018-08-13 NOTE — PATIENT INSTRUCTIONS
Schedule of Personalized Health Plan  (Provide Copy to Patient)  The best way to stay healthy is to live a healthy lifestyle. A healthy lifestyle includes regular exercise, eating a well-balanced diet, keeping a healthy weight and not smoking. Regular physical exams and screening tests are another important way to take care of yourself. Preventive exams provided by health care providers can find health problems early when treatment works best and can keep you from getting certain diseases or illnesses. Preventive services include exams, lab tests, screenings, shots, monitoring and information to help you take care of your own health. All people over 65 should have a pneumonia shot. Pneumonia shots are usually only needed once in a lifetime unless your doctor decides differently. All people over 65 should have a yearly flu shot. People over 65 are at medium to high risk for Hepatitis B. Three shots are needed for complete protection. In addition to your physical exam, some screening tests are recommended:    Bone mass measurement (dexa scan) is recommended every two years. will order  Diabetes Mellitus screening is recommended every year. up to date    Glaucoma is an eye disease caused by high pressure in the eye. An eye exam is recommended every year. ordered    Cardiovascular screening tests that check your cholesterol and other blood fat (lipid) levels are recommended every five years. Up to date    Colorectal Cancer screening tests help to find pre-cancerous polyps (growths in the colon) so they can be removed before they turn into cancer. Tests ordered for screening depend on your personal and family history risk factors. pt refused    Screening for Breast Cancer is recommended yearly with a mammogram.    Screening for Cervical Cancer is recommended every two years (annually for certain risk factors, such as previous history of STD or abnormal PAP in past 7 years), with a Pelvic Exam with PAP    Here is a list of your current Health Maintenance items with a due date:  Health Maintenance   Topic Date Due    DTaP/Tdap/Td series (1 - Tdap) 07/07/1974    ZOSTER VACCINE AGE 60>  05/07/2013    EYE EXAM RETINAL OR DILATED Q1  07/29/2017    Bone Densitometry (Dexa) Screening  07/07/2018    Pneumococcal 65+ Low/Medium Risk (2 of 2 - PPSV23) 07/07/2018    GLAUCOMA SCREENING Q2Y  07/29/2018    Influenza Age 9 to Adult  08/01/2018    HEMOGLOBIN A1C Q6M  09/05/2018    LIPID PANEL Q1  10/25/2018    MICROALBUMIN Q1  03/05/2019    BREAST CANCER SCRN MAMMOGRAM  06/22/2019    FOOT EXAM Q1  08/13/2019    COLONOSCOPY  10/25/2027    Hepatitis C Screening  Completed

## 2018-08-13 NOTE — MR AVS SNAPSHOT
110 [facility] [address] [place], [place] [id number] Patient: Ana Banks MRN: [id number] RBD:5/7/5426 Visit Information Date & Time Provider Department Dept. Phone Encounter #  
 8/13/2018  1:00 PM Paulino Carrizales MD [facility] Internal Medicine [phone] [id number] Upcoming Health Maintenance Date Due DTaP/Tdap/Td series (1 - Tdap) 7/7/1974 ZOSTER VACCINE AGE 60> 5/7/2013 EYE EXAM RETINAL OR DILATED Q1 7/29/2017 Bone Densitometry (Dexa) Screening 7/7/2018 Pneumococcal 65+ Low/Medium Risk (2 of 2 - PPSV23) 7/7/2018 GLAUCOMA SCREENING Q2Y 7/29/2018 Influenza Age 5 to Adult 8/1/2018 HEMOGLOBIN A1C Q6M 9/5/2018 LIPID PANEL Q1 10/25/2018 MICROALBUMIN Q1 3/5/2019 BREAST CANCER SCRN MAMMOGRAM 6/22/2019 FOOT EXAM Q1 8/13/2019 COLONOSCOPY 10/25/2027 Allergies as of 8/13/2018  Review Complete On: 8/13/2018 By: Paulino Carrizales MD  
  
 Severity Noted Reaction Type Reactions Pcn [Penicillins]  02/25/2011    Rash Pcn [Penicillins]  04/12/2011    Hives Current Immunizations  Reviewed on 8/13/2018 Name Date Pneumococcal Conjugate (PCV-13) 8/10/2016 Reviewed by Paulino Carrizales MD on 8/13/2018 at  2:16 PM  
You Were Diagnosed With   
  
 Codes Comments Welcome to Medicare preventive visit    -  Primary ICD-10-CM: Z00.00 ICD-9-CM: V70.0 Type 2 diabetes mellitus without complication, without long-term current use of insulin (HCC)     ICD-10-CM: E11.9 ICD-9-CM: 250.00 Chronic obstructive pulmonary disease, unspecified COPD type ([facility]     ICD-10-CM: J44.9 ICD-9-CM: 864 Essential hypertension     ICD-10-CM: I10 
ICD-9-CM: 401.9 Encounter for immunization     ICD-10-CM: F09 ICD-9-CM: V03.89 Refused pneumococcal vaccine     ICD-10-CM: Z28.21 ICD-9-CM: V64.06 Screening for breast cancer     ICD-10-CM: Z12.31 
ICD-9-CM: V76.10 ACP (advance care planning)     ICD-10-CM: Z71.89 ICD-9-CM: V65.49 Vitamin D deficiency     ICD-10-CM: E55.9 ICD-9-CM: 268.9 Menopause     ICD-10-CM: Z78.0 ICD-9-CM: 627.2 Vitals BP Pulse Temp Resp Height(growth percentile) Weight(growth percentile) 141/58 (BP 1 Location: Left arm, BP Patient Position: Sitting) 61 97.4 °F (36.3 °C) (Oral) 20 5' 1\" (1.549 m) 180 lb (81.6 kg) SpO2 BMI OB Status Smoking Status 93% 34.01 kg/m2 Postmenopausal Current Every Day Smoker Vitals History BMI and BSA Data Body Mass Index Body Surface Area 34.01 kg/m 2 1.87 m 2 Preferred Pharmacy Pharmacy Name Phone [id number] [address] [phone] Your Updated Medication List  
  
   
This list is accurate as of 8/13/18  2:24 PM.  Always use your most recent med list. amLODIPine 10 mg tablet Commonly known as:  [place] Mend TAKE 1 TABLET BY MOUTH EVERY DAY  
  
 ammonium lactate 12 % lotion Commonly known as:  LAC-HYDRIN  
rub in to affected area well  
  
 aspirin delayed-release 81 mg tablet Take  by mouth daily. Blood-Glucose Meter monitoring kit Check sugar QD  
  
 buPROPion  mg tablet Commonly known as:  Tisha Sizer Take 1 Tab by mouth every morning. diclofenac 1 % Gel Commonly known as:  VOLTAREN  
APPLY TO AFFECTED AREA TWO (2) TIMES DAILY AS NEEDED. famotidine 20 mg tablet Commonly known as:  PEPCID Take 1 Tab by mouth nightly. FLUoxetine 20 mg capsule Commonly known as:  PROzac TAKE 1 TABLET BY MOUTH EVERY MORNING. Lancets Misc Check blood sugar one time a day  
  
 losartan 100 mg tablet Commonly known as:  COZAAR  
TAKE 1 TABLET EVERY DAY  
  
 metFORMIN 500 mg tablet Commonly known as:  GLUCOPHAGE  
TAKE 1 TABLET BY MOUTH TWO (2) TIMES DAILY (WITH MEALS). metoprolol tartrate 50 mg tablet Commonly known as:  LOPRESSOR  
TAKE 1 TABLET TWICE A DAY  
  
 ONETOUCH ULTRA TEST strip Generic drug:  glucose blood VI test strips CHECK BLOOD SUGAR DAILY polyethylene glycol 17 gram/dose powder Commonly known as:  Andi Sport TAKE 17 G BY MOUTH DAILY. SENNA PLUS 8.6-50 mg per tablet Generic drug:  senna-docusate TAKE 1 TABLET EVERY DAY  
  
 simvastatin 20 mg tablet Commonly known as:  ZOCOR  
TAKE 1 TABLET BY MOUTH NIGHTLY SPIRIVA WITH HANDIHALER 18 mcg inhalation capsule Generic drug:  tiotropium TAKE 1 CAPSULE BY INHALATION DAILY. TRILEPTAL PO Take 600 mg by mouth daily. VITAMIN D3 2,000 unit Tab Generic drug:  cholecalciferol (vitamin D3) Take  by mouth. We Performed the Following AMB POC EKG ROUTINE W/ 12 LEADS, INTER & REP [46197 CPT(R)] HEMOGLOBIN A1C WITH EAG [71851 CPT(R)] METABOLIC PANEL, COMPREHENSIVE [30009 CPT(R)] MICROALBUMIN, UR, RAND W/ MICROALB/CREAT RATIO [id number] CPT(R)] REFERRAL TO OPHTHALMOLOGY [REF57 Custom] VITAMIN D, 25 HYDROXY B2507802 CPT(R)] To-Do List   
 10/13/2018 Imaging:  DEXA BONE DENSITY STUDY AXIAL   
  
 10/13/2018 Imaging:  VICKIE MAMMO BI SCREENING INCL CAD Referral Information Referral ID Referred By Referred To  
  
 [id number] [facility]EL[facility] [address] [facility], [address] Visits Status Start Date End Date 1 New Request 8/13/18 8/13/19 If your referral has a status of pending review or denied, additional information will be sent to support the outcome of this decision. Patient Instructions Schedule of Personalized Health Plan (Provide Copy to Patient) The best way to stay healthy is to live a healthy lifestyle. A healthy lifestyle includes regular exercise, eating a well-balanced diet, keeping a healthy weight and not smoking.  
 
Regular physical exams and screening tests are another important way to take care of yourself. Preventive exams provided by health care providers can find health problems early when treatment works best and can keep you from getting certain diseases or illnesses. Preventive services include exams, lab tests, screenings, shots, monitoring and information to help you take care of your own health. All people over 65 should have a pneumonia shot. Pneumonia shots are usually only needed once in a lifetime unless your doctor decides differently. All people over 65 should have a yearly flu shot. People over 65 are at medium to high risk for Hepatitis B. Three shots are needed for complete protection. In addition to your physical exam, some screening tests are recommended: 
 
Bone mass measurement (dexa scan) is recommended every two years. will order Diabetes Mellitus screening is recommended every year. up to date Glaucoma is an eye disease caused by high pressure in the eye. An eye exam is recommended every year. ordered Cardiovascular screening tests that check your cholesterol and other blood fat (lipid) levels are recommended every five years. Up to date Colorectal Cancer screening tests help to find pre-cancerous polyps (growths in the colon) so they can be removed before they turn into cancer. Tests ordered for screening depend on your personal and family history risk factors. pt refused Screening for Breast Cancer is recommended yearly with a mammogram. 
 
Screening for Cervical Cancer is recommended every two years (annually for certain risk factors, such as previous history of STD or abnormal PAP in past 7 years), with a Pelvic Exam with PAP Here is a list of your current Health Maintenance items with a due date: 
Health Maintenance Topic Date Due  
 DTaP/Tdap/Td series (1 - Tdap) 07/07/1974  ZOSTER VACCINE AGE 60>  05/07/2013  
 EYE EXAM RETINAL OR DILATED Q1  07/29/2017  Bone Densitometry (Dexa) Screening  07/07/2018  Pneumococcal 65+ Low/Medium Risk (2 of 2 - PPSV23) 07/07/2018  GLAUCOMA SCREENING Q2Y  07/29/2018  Influenza Age 5 to Adult  08/01/2018  HEMOGLOBIN A1C Q6M  09/05/2018  LIPID PANEL Q1  10/25/2018  MICROALBUMIN Q1  03/05/2019  BREAST CANCER SCRN MAMMOGRAM  06/22/2019  
 FOOT EXAM Q1  08/13/2019  COLONOSCOPY  10/25/2027  Hepatitis C Screening  Completed Introducing [facility] SERVICES! Dear Dillon Mcknight: Thank you for requesting a [employer] account. Our records indicate that you have previously registered for a [employer] account but its currently inactive. Please call our [employer] support line at [phone]. Additional Information If you have questions, please visit the Frequently Asked Questions section of the [employer] website at [email or web address] Remember, [employer] is NOT to be used for urgent needs. For medical emergencies, dial 911. Now available from your iPhone and Android! Please provide this summary of care documentation to your next provider. Your primary care clinician is listed as [facility] Nurse Navigator. If you have any questions after today's visit, please call [phone].

## 2018-08-13 NOTE — PROGRESS NOTES
Chief Complaint   Patient presents with    Cyst     RUE     1. Have you been to the ER, urgent care clinic since your last visit? Hospitalized since your last visit? No    2. Have you seen or consulted any other health care providers outside of the [address] [place] [address] since your last visit? Include any pap smears or colon screening.  No

## 2018-08-13 NOTE — PROGRESS NOTES
HISTORY OF PRESENT ILLNESS  Benito Verdugo is a 70 y.o. female here for follow up. Here for welcome to Medicare visit. Reports small cyst on the right elbow. Feels a retention cyst.  Reassured. Doing well with all meds. BP seems OK.has COPD.use inhaler,stable. Has diabetes. watching diet. Need colonoscopy mammogram and bone density. Patient refused  She has bipolar disorder. seeing psych. stable. Need lab work. Refused Pneumonia vaccines and colonoscopy. Cyst   Pertinent negatives include no chest pain. Hypertension    Pertinent negatives include no chest pain, no palpitations and no blurred vision. Welcome To Medicare   Pertinent negatives include no chest pain. Diabetes   Pertinent negatives include no chest pain. COPD   Pertinent negatives include no chest pain. Mental Health Problem   Pertinent negatives include no chest pain. Review of Systems   Constitutional: Negative. Negative for chills and fever. HENT: Negative. Eyes: Negative. Negative for blurred vision and double vision. Respiratory: Negative. Cardiovascular: Negative. Negative for chest pain and palpitations. Gastrointestinal: Negative. Genitourinary: Negative. Musculoskeletal: Negative. Neurological: Negative. Psychiatric/Behavioral: Negative. Physical Exam   Constitutional: She appears well-developed and well-nourished. No distress. Neck: Normal range of motion. Neck supple. No JVD present. No thyromegaly present. Cardiovascular: Normal rate, regular rhythm, normal heart sounds and intact distal pulses. Pulmonary/Chest: Effort normal and breath sounds normal. No respiratory distress. She has no wheezes. Abdominal: Soft. Bowel sounds are normal.   Musculoskeletal: She exhibits no edema or tenderness.    Diabetic foot exam:     Left Foot:   Visual Exam: normal except onycomycosis of great toe   Pulse DP: 2+ (normal)   Filament test: normal sensation    Vibratory sensation: normal Right Foot:   Visual Exam: normal except onycomycosis present   Pulse DP: 2+ (normal)   Filament test: normal sensation    Vibratory sensation: normal   Psychiatric: She has a normal mood and affect. Her behavior is normal.       ASSESSMENT and PLAN  Diagnoses and all orders for this visit:    1. Welcome to Medicare preventive visit    EKG done shows left bundle branch block. No ST-T wave changes. Unchanged from the previous EKG. All presenting health measures discussed with the patient. Patient refused to do colonoscopy and vaccinations. We will order rest.   AMB POC EKG ROUTINE W/ 12 LEADS, INTER & REP  2. Type 2 diabetes mellitus without complication, without long-term current use of insulin (HCC)    Stable A1c. Will check,  -     REFERRAL TO OPHTHALMOLOGY  -     METABOLIC PANEL, COMPREHENSIVE  -     HEMOGLOBIN A1C WITH EAG  -     MICROALBUMIN, UR, RAND W/ MICROALB/CREAT RATIO    3. Chronic obstructive pulmonary disease, unspecified COPD type ([facility]    Stable. On inhaler.  -     AMB POC EKG ROUTINE W/ 12 LEADS, INTER & REP    4. Essential hypertension     stable on current regimen. Will continue same. Will check,      -     METABOLIC PANEL, COMPREHENSIVE    5. Encounter for immunization    6. Refused pneumococcal vaccine    7. Screening for breast cancer    Will order,  -     VICKIE MAMMO BI SCREENING INCL CAD; Future    8. ACP (advance care planning)    9. Vitamin D deficiency  -     VITAMIN D, 25 HYDROXY    10. Menopause    We will order,  -     DEXA BONE DENSITY STUDY AXIAL; Future        Reports a small cyst on right elbow area. Discussed expected course/resolution/complications of diagnosis in detail with patient. Medication risks/benefits/costs/interactions/alternatives discussed with patient. Pt was given an after visit summary which includes diagnoses, current medications & vitals. Pt expressed understanding with the diagnosis and plan.

## 2018-08-14 LAB
25(OH)D3+25(OH)D2 SERPL-MCNC: 23.1 NG/ML (ref 30–100)
ALBUMIN SERPL-MCNC: 4.5 G/DL (ref 3.6–4.8)
ALBUMIN/CREAT UR: 323.8 MG/G CREAT (ref 0–30)
ALBUMIN/GLOB SERPL: 1.6 {RATIO} (ref 1.2–2.2)
ALP SERPL-CCNC: 102 IU/L (ref 39–117)
ALT SERPL-CCNC: 11 IU/L (ref 0–32)
AST SERPL-CCNC: 24 IU/L (ref 0–40)
BILIRUB SERPL-MCNC: 0.4 MG/DL (ref 0–1.2)
BUN SERPL-MCNC: 6 MG/DL (ref 8–27)
BUN/CREAT SERPL: 10 (ref 12–28)
CALCIUM SERPL-MCNC: 9.5 MG/DL (ref 8.7–10.3)
CHLORIDE SERPL-SCNC: 96 MMOL/L (ref 96–106)
CO2 SERPL-SCNC: 13 MMOL/L (ref 20–29)
CREAT SERPL-MCNC: 0.6 MG/DL (ref 0.57–1)
CREAT UR-MCNC: 117.7 MG/DL
EST. AVERAGE GLUCOSE BLD GHB EST-MCNC: 134 MG/DL
GLOBULIN SER CALC-MCNC: 2.8 G/DL (ref 1.5–4.5)
GLUCOSE SERPL-MCNC: 123 MG/DL (ref 65–99)
HBA1C MFR BLD: 6.3 % (ref 4.8–5.6)
MICROALBUMIN UR-MCNC: 381.1 UG/ML
POTASSIUM SERPL-SCNC: 6 MMOL/L (ref 3.5–5.2)
PROT SERPL-MCNC: 7.3 G/DL (ref 6–8.5)
SODIUM SERPL-SCNC: 132 MMOL/L (ref 134–144)

## 2018-08-16 NOTE — PROGRESS NOTES
Low vit D. Adv to take OTC vit D 2000 unit po every day for 4 months. Microalbumin in urine. Make sure she is taking losartan every day.

## 2018-08-24 NOTE — PROGRESS NOTES
Letter mailed to patient with results and recommendations from provider  Patient called left message to return call to clinic for results.

## 2018-09-24 RX ORDER — METFORMIN HYDROCHLORIDE 500 MG/1
TABLET ORAL
Qty: 180 TAB | Refills: 1 | Status: SHIPPED | OUTPATIENT
Start: 2018-09-24 | End: 2019-03-20 | Stop reason: SDUPTHER

## 2018-09-28 RX ORDER — METOPROLOL TARTRATE 50 MG/1
TABLET ORAL
Qty: 180 TAB | Refills: 1 | Status: SHIPPED | OUTPATIENT
Start: 2018-09-28 | End: 2019-03-29 | Stop reason: SDUPTHER

## 2018-10-05 DIAGNOSIS — J41.0 SMOKERS' COUGH (HCC): ICD-10-CM

## 2018-10-05 RX ORDER — TIOTROPIUM BROMIDE 18 UG/1
CAPSULE ORAL; RESPIRATORY (INHALATION)
Qty: 30 CAP | Refills: 1 | Status: SHIPPED | OUTPATIENT
Start: 2018-10-05 | End: 2018-10-09 | Stop reason: SDUPTHER

## 2018-10-09 DIAGNOSIS — J41.0 SMOKERS' COUGH (HCC): ICD-10-CM

## 2018-11-02 DIAGNOSIS — F31.77 BIPOLAR DISORDER, IN PARTIAL REMISSION, MOST RECENT EPISODE MIXED (HCC): ICD-10-CM

## 2018-11-02 RX ORDER — BUPROPION HYDROCHLORIDE 150 MG/1
TABLET ORAL
Qty: 90 TAB | Refills: 1 | Status: SHIPPED | OUTPATIENT
Start: 2018-11-02 | End: 2019-01-23 | Stop reason: SDUPTHER

## 2018-12-21 RX ORDER — AMLODIPINE BESYLATE 10 MG/1
TABLET ORAL
Qty: 90 TAB | Refills: 1 | Status: SHIPPED | OUTPATIENT
Start: 2018-12-21 | End: 2019-06-21 | Stop reason: SDUPTHER

## 2018-12-21 RX ORDER — LOSARTAN POTASSIUM 100 MG/1
TABLET ORAL
Qty: 90 TAB | Refills: 1 | Status: SHIPPED | OUTPATIENT
Start: 2018-12-21 | End: 2019-06-21 | Stop reason: SDUPTHER

## 2019-01-04 DIAGNOSIS — E78.00 PURE HYPERCHOLESTEROLEMIA: Chronic | ICD-10-CM

## 2019-01-07 RX ORDER — SIMVASTATIN 20 MG/1
TABLET, FILM COATED ORAL
Qty: 90 TAB | Refills: 1 | Status: SHIPPED | OUTPATIENT
Start: 2019-01-07 | End: 2019-07-16 | Stop reason: SDUPTHER

## 2019-01-23 DIAGNOSIS — F31.77 BIPOLAR DISORDER, IN PARTIAL REMISSION, MOST RECENT EPISODE MIXED (HCC): ICD-10-CM

## 2019-01-23 RX ORDER — BUPROPION HYDROCHLORIDE 150 MG/1
TABLET ORAL
Qty: 90 TAB | Refills: 1 | Status: SHIPPED | OUTPATIENT
Start: 2019-01-23 | End: 2019-08-07

## 2019-03-20 RX ORDER — METFORMIN HYDROCHLORIDE 500 MG/1
TABLET ORAL
Qty: 180 TAB | Refills: 1 | Status: SHIPPED | OUTPATIENT
Start: 2019-03-20 | End: 2019-08-07 | Stop reason: SDUPTHER

## 2019-04-06 DIAGNOSIS — J41.0 SMOKERS' COUGH (HCC): ICD-10-CM

## 2019-04-08 RX ORDER — TIOTROPIUM BROMIDE 18 UG/1
CAPSULE ORAL; RESPIRATORY (INHALATION)
Qty: 30 CAP | Refills: 5 | Status: SHIPPED | OUTPATIENT
Start: 2019-04-08 | End: 2019-10-07 | Stop reason: SDUPTHER

## 2019-07-19 RX ORDER — METOPROLOL TARTRATE 50 MG/1
TABLET ORAL
Qty: 42 TAB | Refills: 0 | Status: SHIPPED | OUTPATIENT
Start: 2019-07-19 | End: 2019-08-02 | Stop reason: SDUPTHER

## 2019-07-19 NOTE — TELEPHONE ENCOUNTER
----- Message from Rebeka Frederick sent at 7/18/2019  4:59 PM EDT -----  Regarding: /Refill  Medication Refill    Caller (if not patient):      Relationship of caller (if not patient):      Best contact number(s):[phone]      Name of medication and dosage if known:Metroprolol 50mg      Is patient out of this medication (yes/no):yes       Pharmacy name:[facility]    Pharmacy listed in chart? (yes/no):yes  Pharmacy phone [phone]      Details to clarify the request:  Pt requesting a short term supply for Rx\"Metoprolol 50mg\" to be called into pharmacy til upcoming appointment on 08/7/19.     Dahiana Arndt

## 2019-07-25 RX ORDER — LOSARTAN POTASSIUM 100 MG/1
TABLET ORAL
Qty: 20 TAB | Refills: 0 | Status: SHIPPED | OUTPATIENT
Start: 2019-07-25 | End: 2019-08-07 | Stop reason: SDUPTHER

## 2019-07-25 RX ORDER — AMLODIPINE BESYLATE 10 MG/1
10 TABLET ORAL DAILY
Qty: 20 TAB | Refills: 0 | Status: SHIPPED | OUTPATIENT
Start: 2019-07-25 | End: 2019-08-07 | Stop reason: SDUPTHER

## 2019-07-25 NOTE — TELEPHONE ENCOUNTER
----- Message from Payal Ochoa sent at 7/25/2019  4:16 PM EDT -----  Regarding: /Refill  Medication Refill    Caller (if not patient):      Relationship of caller (if not patient):      Best contact number(s):[phone]      Name of medication and dosage if known: Losartan 100mg and Amlodipine 10 mg      Is patient out of this medication (yes/no):no      Pharmacy name: [facility] listed in chart? (yes/no):Yes  Pharmacy phone number: [phone]      Details to clarify the request:Pt stated she needs enough pills to hold her til upcoming appointment on 08/7/19 at 2:45pm.      Payal Ochoa

## 2019-08-07 ENCOUNTER — HOSPITAL ENCOUNTER (OUTPATIENT)
Dept: MAMMOGRAPHY | Age: 64
Discharge: HOME | End: 2019-08-07
Attending: INTERNAL MEDICINE
Payer: MEDICARE

## 2019-08-07 ENCOUNTER — OFFICE VISIT (OUTPATIENT)
Dept: INTERNAL MEDICINE CLINIC | Age: 64
End: 2019-08-07

## 2019-08-07 VITALS
TEMPERATURE: 97.9 F | WEIGHT: 161.6 LBS | HEIGHT: 61 IN | DIASTOLIC BLOOD PRESSURE: 62 MMHG | OXYGEN SATURATION: 97 % | HEART RATE: 68 BPM | BODY MASS INDEX: 30.51 KG/M2 | SYSTOLIC BLOOD PRESSURE: 132 MMHG | RESPIRATION RATE: 16 BRPM

## 2019-08-07 DIAGNOSIS — R53.83 FATIGUE, UNSPECIFIED TYPE: ICD-10-CM

## 2019-08-07 DIAGNOSIS — E78.00 PURE HYPERCHOLESTEROLEMIA: ICD-10-CM

## 2019-08-07 DIAGNOSIS — E55.9 VITAMIN D DEFICIENCY: ICD-10-CM

## 2019-08-07 DIAGNOSIS — Z12.39 SCREENING BREAST EXAMINATION: ICD-10-CM

## 2019-08-07 DIAGNOSIS — I10 ESSENTIAL HYPERTENSION: ICD-10-CM

## 2019-08-07 DIAGNOSIS — E11.9 TYPE 2 DIABETES MELLITUS WITHOUT COMPLICATION, WITHOUT LONG-TERM CURRENT USE OF INSULIN (HCC): Primary | ICD-10-CM

## 2019-08-07 DIAGNOSIS — Z71.89 ACP (ADVANCE CARE PLANNING): ICD-10-CM

## 2019-08-07 DIAGNOSIS — J44.9 CHRONIC OBSTRUCTIVE PULMONARY DISEASE, UNSPECIFIED COPD TYPE (HCC): ICD-10-CM

## 2019-08-07 DIAGNOSIS — Z00.00 MEDICARE ANNUAL WELLNESS VISIT, SUBSEQUENT: ICD-10-CM

## 2019-08-07 PROCEDURE — 77067 SCR MAMMO BI INCL CAD: CPT

## 2019-08-07 RX ORDER — METOPROLOL TARTRATE 50 MG/1
TABLET ORAL
Qty: 60 TAB | Refills: 5 | Status: SHIPPED | OUTPATIENT
Start: 2019-08-07 | End: 2020-01-30

## 2019-08-07 RX ORDER — AMLODIPINE BESYLATE 10 MG/1
10 TABLET ORAL DAILY
Qty: 20 TAB | Refills: 0 | Status: SHIPPED | OUTPATIENT
Start: 2019-08-07 | End: 2019-08-19 | Stop reason: SDUPTHER

## 2019-08-07 RX ORDER — METFORMIN HYDROCHLORIDE 500 MG/1
500 TABLET ORAL 2 TIMES DAILY WITH MEALS
Qty: 180 TAB | Refills: 1 | Status: SHIPPED | OUTPATIENT
Start: 2019-08-07 | End: 2020-02-24

## 2019-08-07 RX ORDER — SIMVASTATIN 20 MG/1
20 TABLET, FILM COATED ORAL
Qty: 30 TAB | Refills: 5 | Status: SHIPPED | OUTPATIENT
Start: 2019-08-07 | End: 2020-01-30

## 2019-08-07 RX ORDER — LOSARTAN POTASSIUM 100 MG/1
TABLET ORAL
Qty: 30 TAB | Refills: 5 | Status: SHIPPED | OUTPATIENT
Start: 2019-08-07 | End: 2020-01-30

## 2019-08-07 NOTE — PROGRESS NOTES
This is the Subsequent Medicare Annual Wellness Exam, performed 12 months or more after the Initial AWV or the last Subsequent AWV    I have reviewed the patient's medical history in detail and updated the computerized patient record. History     Past Medical History:   Diagnosis Date    COPD     early stage of emphysema    Depression     bipolar disease    Diabetes ([facility]     Diabetes ([facility]     for 15 years    Hypercholesterolemia     Hypertension     LBBB (left bundle branch block)     Psychiatric disorder     BIpolar    Renal cyst, right     left kidney in pelvis    Thyroid disease     thyroid nodule await for biopsy      Past Surgical History:   Procedure Laterality Date    BREAST SURGERY PROCEDURE UNLISTED      breast cyst removed from left breast    HX BREAST BIOPSY Right     cyst removed at age 22     Current Outpatient Medications   Medication Sig Dispense Refill    metoprolol tartrate (LOPRESSOR) 50 mg tablet TAKE 1 TABLET BY MOUTH TWICE A DAY 10 Tab 0    losartan (COZAAR) 100 mg tablet TAKE 1 TABLET BY MOUTH EVERY DAY 20 Tab 0    amLODIPine (NORVASC) 10 mg tablet Take 1 Tab by mouth daily. 20 Tab 0    simvastatin (ZOCOR) 20 mg tablet TAKE 1 TABLET BY MOUTH NIGHTLY 30 Tab 0    metFORMIN (GLUCOPHAGE) 500 mg tablet TAKE 1 TABLET BY MOUTH TWICE A DAY WITH MEALS 180 Tab 1    buPROPion XL (WELLBUTRIN XL) 150 mg tablet TAKE 1 TABLET BY MOUTH EVERY MORNING 90 Tab 1    ammonium lactate (LAC-HYDRIN) 12 % lotion rub in to affected area well 400 mL 2    SENNA PLUS 8.6-50 mg per tablet TAKE 1 TABLET EVERY DAY 30 Tab 2    ONETOUCH ULTRA TEST strip CHECK BLOOD SUGAR DAILY 100 Strip 0    cholecalciferol, vitamin D3, (VITAMIN D3) 2,000 unit tab Take  by mouth.  FLUoxetine (PROZAC) 20 mg capsule TAKE 1 TABLET BY MOUTH EVERY MORNING. 90 Cap 1    famotidine (PEPCID) 20 mg tablet Take 1 Tab by mouth nightly.  30 Tab 0    Lancets misc Check blood sugar one time a day 1 Package 11    Blood-Glucose Meter monitoring kit Check sugar QD 1 Kit 0    aspirin delayed-release 81 mg tablet Take  by mouth daily.  OXCARBAZEPINE (TRILEPTAL PO) Take 600 mg by mouth daily.  SPIRIVA WITH HANDIHALER 18 mcg inhalation capsule TAKE 1 CAP BY INHALATION DAILY. 30 Cap 5    diclofenac (VOLTAREN) 1 % gel APPLY TO AFFECTED AREA TWO (2) TIMES DAILY AS NEEDED. 100 g 1    polyethylene glycol (MIRALAX) 17 gram/dose powder TAKE 17 G BY MOUTH DAILY. 1054 g 0     Allergies   Allergen Reactions    Pcn [Penicillins] Rash    Pcn [Penicillins] Hives     Family History   Problem Relation Age of Onset    Diabetes Mother     Hypertension Mother    [facility] Elevated Lipids Mother     Cancer Mother         breast ac    Breast Cancer Mother     Cancer Father         tongue ca    Diabetes Sister     Breast Cancer Sister     Heart Disease Brother      Social History     Tobacco Use    Smoking status: Current Every Day Smoker     Packs/day: 1.00     Years: 30.00     Pack years: 30.00     Types: Cigarettes    Smokeless tobacco: Never Used   Substance Use Topics    Alcohol use: No     Comment: 1 beer per week. Patient Active Problem List   Diagnosis Code    Pure hypercholesterolemia E78.00    COPD (chronic obstructive pulmonary disease) ([address] J44.9    Morbid obesity ([address] E66.01    Bipolar disorder ([facility] F31.9    HTN (hypertension) I10    Type 2 diabetes mellitus without complication ([address] O57.3    Colonoscopy refused Z53.20    Type 2 diabetes with nephropathy ([facility] E11.21    Hyponatremia E87.1    LBBB (left bundle branch block) I44.7    Refused pneumococcal vaccine Z28.21       Depression Risk Factor Screening:     3 most recent PHQ Screens 8/7/2019   PHQ Not Done Active Diagnosis of Depression or Bipolar Disorder     Alcohol Risk Factor Screening: On any occasion in the past three months you have had more than 3 drinks containing alcohol.     Functional Ability and Level of Safety:   Hearing Loss  Hearing is good. Activities of Daily Living  The home contains: no safety equipment. Patient does total self care    Fall Risk  Fall Risk Assessment, last 12 mths 8/13/2018   Able to walk? Yes   Fall in past 12 months?  No       Abuse Screen  Patient is not abused    Cognitive Screening   Evaluation of Cognitive Function:  Has your family/caregiver stated any concerns about your memory: no  Normal    Patient Care Team   Patient Care Team:  Yuly SEGURA (Inactive) as PCP - General (Internal Medicine)  Waqar Beard LPN as Ambulatory Care Navigator (Internal Medicine)    Assessment/Plan   Education and counseling provided:  Are appropriate based on today's review and evaluation  End-of-Life planning (with patient's consent)  Screening Mammography  Bone mass measurement (DEXA)  Screening for glaucoma  Diabetes screening test        Health Maintenance Due   Topic Date Due    DTaP/Tdap/Td series (1 - Tdap) 07/07/1974    Shingrix Vaccine Age 50> (1 of 2) 07/07/2003    Pneumococcal 65+ years (2 of 2 - PPSV23) 07/07/2018    LIPID PANEL Q1  10/25/2018    HEMOGLOBIN A1C Q6M  02/13/2019    BREAST CANCER SCRN MAMMOGRAM  06/22/2019    Influenza Age 9 to Adult  08/01/2019    FOOT EXAM Q1  08/13/2019    MICROALBUMIN Q1  08/13/2019

## 2019-08-07 NOTE — PROGRESS NOTES
HISTORY OF PRESENT ILLNESS  Yue Carrion is a 75 y.o. female here for follow up. Doing well with all meds. BP seems OK.has COPD.use inhaler,stable. Need refill on medicine. Has diabetes. watching diet. On metformin. Eye checkup done she thinks is within 1 year. Need medications refilled. She has bipolar disorder. seeing psych. stable. Need lab work. Need mammogram.  Depression seems under control. She is seeing psychiatrist.  Refused Pneumonia vaccines and colonoscopy. Here for Medicare wellness visit. Has no living will. Hypertension    Pertinent negatives include no chest pain, no palpitations and no blurred vision. Diabetes   Pertinent negatives include no chest pain. COPD   Pertinent negatives include no chest pain. Mental Health Problem   Pertinent negatives include no chest pain. Cholesterol Problem   Pertinent negatives include no chest pain. Review of Systems   Constitutional: Negative. Negative for chills and fever. HENT: Negative. Eyes: Negative. Negative for blurred vision and double vision. Respiratory: Negative. Cardiovascular: Negative. Negative for chest pain and palpitations. Gastrointestinal: Negative. Genitourinary: Negative. Musculoskeletal: Negative. Neurological: Negative. Psychiatric/Behavioral: Negative. Physical Exam   Constitutional: She appears well-developed and well-nourished. No distress. Neck: Normal range of motion. Neck supple. No JVD present. No thyromegaly present. Cardiovascular: Normal rate, regular rhythm, normal heart sounds and intact distal pulses. Pulmonary/Chest: Effort normal and breath sounds normal. No respiratory distress. She has no wheezes. Abdominal: Soft. Bowel sounds are normal.   Musculoskeletal: She exhibits no edema or tenderness.    Diabetic foot exam:     Left Foot:   Visual Exam: normal except onycomycosis of great toe   Pulse DP: 2+ (normal)   Filament test: normal sensation    Vibratory sensation: normal      Right Foot:   Visual Exam: normal except onycomycosis present   Pulse DP: 2+ (normal)   Filament test: normal sensation    Vibratory sensation: normal   Psychiatric: She has a normal mood and affect. Her behavior is normal.       ASSESSMENT and PLAN  Diagnoses and all orders for this visit:    1. Type 2 diabetes mellitus without complication, without long-term current use of insulin (HCC)  On metformin. Will check,    -     HEMOGLOBIN A1C WITH EAG  -     MICROALBUMIN, UR, RAND W/ MICROALB/CREAT RATIO  -     metFORMIN (GLUCOPHAGE) 500 mg tablet; Take 1 Tab by mouth two (2) times daily (with meals). 2. Pure hypercholesterolemia    Will check,  -     LIPID PANEL  -     simvastatin (ZOCOR) 20 mg tablet; Take 1 Tab by mouth nightly. 3. Essential hypertension    Stable blood pressure. Will check,  -     CBC WITH AUTOMATED DIFF  -     METABOLIC PANEL, COMPREHENSIVE  -     amLODIPine (NORVASC) 10 mg tablet; Take 1 Tab by mouth daily. -     metoprolol tartrate (LOPRESSOR) 50 mg tablet; 1 tab po BID  -     losartan (COZAAR) 100 mg tablet; TAKE 1 TABLET BY MOUTH EVERY DAY    4. Chronic obstructive pulmonary disease, unspecified COPD type (HCC)  Stable. Using his Spiriva as needed. 5. Medicare annual wellness visit, subsequent    6. ACP (advance care planning)    7. Screening breast examination  -     [facility] MAMMO BI SCREENING INCL CAD; Future    8. Vitamin D deficiency  -     VITAMIN D, 25 HYDROXY    9. Fatigue, unspecified type    Will order,  -     CBC WITH AUTOMATED DIFF  -     METABOLIC PANEL, COMPREHENSIVE  -     TSH 3RD GENERATION          Reports a small cyst on right elbow area. Discussed expected course/resolution/complications of diagnosis in detail with patient. Medication risks/benefits/costs/interactions/alternatives discussed with patient. Pt was given an after visit summary which includes diagnoses, current medications & vitals.    Pt expressed understanding with the diagnosis and plan.

## 2019-08-07 NOTE — PATIENT INSTRUCTIONS
Medicare Wellness Visit, Female The best way to live healthy is to have a lifestyle where you eat a well-balanced diet, exercise regularly, limit alcohol use, and quit all forms of tobacco/nicotine, if applicable. Regular preventive services are another way to keep healthy. Preventive services (vaccines, screening tests, monitoring & exams) can help personalize your care plan, which helps you manage your own care. Screening tests can find health problems at the earliest stages, when they are easiest to treat. [facility] follows the current, evidence-based guidelines published by the [facility] Is[facility] ([facility]) when recommending preventive services for our patients. Because we follow these guidelines, sometimes recommendations change over time as research supports it. (For example, mammograms used to be recommended annually. Even though Medicare will still pay for an annual mammogram, the newer guidelines recommend a mammogram every two years for women of average risk.) Of course, you and your doctor may decide to screen more often for some diseases, based on your risk and your health status. Preventive services for you include: - Medicare offers their members a free annual wellness visit, which is time for you and your primary care provider to discuss and plan for your preventive service needs. Take advantage of this benefit every year! 
-All adults over the age of 72 should receive the recommended pneumonia vaccines. Current USPSTF guidelines recommend a series of two vaccines for the best pneumonia protection.  
-All adults should have a flu vaccine yearly and a tetanus vaccine every 10 years. All adults age 61 and older should receive a shingles vaccine once in their lifetime.   
-A bone mass density test is recommended when a woman turns 65 to screen for osteoporosis. This test is only recommended one time, as a screening. Some providers will use this same test as a disease monitoring tool if you already have osteoporosis. -All adults age 38-68 who are overweight should have a diabetes screening test once every three years.  
-Other screening tests and preventive services for persons with diabetes include: an eye exam to screen for diabetic retinopathy, a kidney function test, a foot exam, and stricter control over your cholesterol.  
-Cardiovascular screening for adults with routine risk involves an electrocardiogram (ECG) at intervals determined by your doctor.  
-Colorectal cancer screenings should be done for adults age 54-65 with no increased risk factors for colorectal cancer. There are a number of acceptable methods of screening for this type of cancer. Each test has its own benefits and drawbacks. Discuss with your doctor what is most appropriate for you during your annual wellness visit. The different tests include: colonoscopy (considered the best screening method), a fecal occult blood test, a fecal DNA test, and sigmoidoscopy. -Breast cancer screenings are recommended every other year for women of normal risk, age 54-69. 
-Cervical cancer screenings for women over age 72 are only recommended with certain risk factors.  
-All adults born between [facility] should be screened once for Hepatitis C. Here is a list of your current Health Maintenance items (your personalized list of preventive services) with a due date: 
Health Maintenance Due Topic Date Due  
 DTaP/Tdap/Td  (1 - Tdap) 07/07/1974  Shingles Vaccine (1 of 2) 07/07/2003  Pneumococcal Vaccine (2 of 2 - PPSV23) 07/07/2018  Cholesterol Test   10/25/2018  Hemoglobin A1C    02/13/2019  Mammogram  06/22/2019  Flu Vaccine  08/01/2019 [facility]  08/13/2019  Albumin Urine Test  08/13/2019

## 2019-08-07 NOTE — PROGRESS NOTES
Angelito Best is a 75 y.o. female    Chief Complaint   Patient presents with    Cholesterol Problem    Hypertension    Vitamin D Deficiency     1. Have you been to the ER, urgent care clinic since your last visit? Hospitalized since your last visit? No     2. Have you seen or consulted any other health care providers outside of the [address] [place] [address] since your last visit? Include any pap smears or colon screening.   No        Visit Vitals  /62 (BP 1 Location: Left arm, BP Patient Position: Sitting)   Pulse 68   Temp 97.9 °F (36.6 °C) (Oral)   Resp 16   Ht 5' 1\" (1.549 m)   Wt 161 lb 9.6 oz (73.3 kg)   SpO2 97%   BMI 30.53 kg/m²

## 2019-08-07 NOTE — ACP (ADVANCE CARE PLANNING)
Advance Care Planning (ACP) Provider Conversation Snapshot    Date of ACP Conversation: 08/07/19  Persons included in Conversation:  patient  Length of ACP Conversation in minutes:  16 minutes    Authorized Decision Maker (if patient is incapable of making informed decisions):    This person is:   Healthcare Agent/Medical Power of  under Advance Directive            For Patients with Decision Making Capacity:   Values/Goals: Exploration of values, goals, and preferences if recovery is not expected, even with continued medical treatment in the event of:  Imminent death    Conversation Outcomes / Follow-Up Plan:   Recommended completion of Advance Directive form after review of ACP materials and conversation with prospective healthcare agent

## 2019-08-08 LAB
25(OH)D3+25(OH)D2 SERPL-MCNC: 23 NG/ML (ref 30–100)
ALBUMIN SERPL-MCNC: 4.5 G/DL (ref 3.6–4.8)
ALBUMIN/CREAT UR: 302.3 MG/G CREAT (ref 0–30)
ALBUMIN/GLOB SERPL: 1.9 {RATIO} (ref 1.2–2.2)
ALP SERPL-CCNC: 102 IU/L (ref 39–117)
ALT SERPL-CCNC: 17 IU/L (ref 0–32)
AST SERPL-CCNC: 18 IU/L (ref 0–40)
BASOPHILS # BLD AUTO: 0 X10E3/UL (ref 0–0.2)
BASOPHILS NFR BLD AUTO: 0 %
BILIRUB SERPL-MCNC: 0.4 MG/DL (ref 0–1.2)
BUN SERPL-MCNC: 5 MG/DL (ref 8–27)
BUN/CREAT SERPL: 8 (ref 12–28)
CALCIUM SERPL-MCNC: 9.5 MG/DL (ref 8.7–10.3)
CHLORIDE SERPL-SCNC: 99 MMOL/L (ref 96–106)
CHOLEST SERPL-MCNC: 177 MG/DL (ref 100–199)
CO2 SERPL-SCNC: 19 MMOL/L (ref 20–29)
CREAT SERPL-MCNC: 0.6 MG/DL (ref 0.57–1)
CREAT UR-MCNC: 167.2 MG/DL
EOSINOPHIL # BLD AUTO: 0.1 X10E3/UL (ref 0–0.4)
EOSINOPHIL NFR BLD AUTO: 1 %
ERYTHROCYTE [DISTWIDTH] IN BLOOD BY AUTOMATED COUNT: 14.2 % (ref 12.3–15.4)
EST. AVERAGE GLUCOSE BLD GHB EST-MCNC: 137 MG/DL
GLOBULIN SER CALC-MCNC: 2.4 G/DL (ref 1.5–4.5)
GLUCOSE SERPL-MCNC: 122 MG/DL (ref 65–99)
HBA1C MFR BLD: 6.4 % (ref 4.8–5.6)
HCT VFR BLD AUTO: 46 % (ref 34–46.6)
HDLC SERPL-MCNC: 88 MG/DL
HGB BLD-MCNC: 16.1 G/DL (ref 11.1–15.9)
IMM GRANULOCYTES # BLD AUTO: 0 X10E3/UL (ref 0–0.1)
IMM GRANULOCYTES NFR BLD AUTO: 0 %
INTERPRETATION, 910389: NORMAL
LDLC SERPL CALC-MCNC: 67 MG/DL (ref 0–99)
LYMPHOCYTES # BLD AUTO: 0.9 X10E3/UL (ref 0.7–3.1)
LYMPHOCYTES NFR BLD AUTO: 11 %
Lab: NORMAL
MCH RBC QN AUTO: 34.4 PG (ref 26.6–33)
MCHC RBC AUTO-ENTMCNC: 35 G/DL (ref 31.5–35.7)
MCV RBC AUTO: 98 FL (ref 79–97)
MICROALBUMIN UR-MCNC: 505.5 UG/ML
MONOCYTES # BLD AUTO: 0.7 X10E3/UL (ref 0.1–0.9)
MONOCYTES NFR BLD AUTO: 9 %
NEUTROPHILS # BLD AUTO: 6.1 X10E3/UL (ref 1.4–7)
NEUTROPHILS NFR BLD AUTO: 79 %
PLATELET # BLD AUTO: 245 X10E3/UL (ref 150–450)
POTASSIUM SERPL-SCNC: 5.4 MMOL/L (ref 3.5–5.2)
PROT SERPL-MCNC: 6.9 G/DL (ref 6–8.5)
RBC # BLD AUTO: 4.68 X10E6/UL (ref 3.77–5.28)
SODIUM SERPL-SCNC: 135 MMOL/L (ref 134–144)
TRIGL SERPL-MCNC: 111 MG/DL (ref 0–149)
TSH SERPL DL<=0.005 MIU/L-ACNC: 2.06 UIU/ML (ref 0.45–4.5)
VLDLC SERPL CALC-MCNC: 22 MG/DL (ref 5–40)
WBC # BLD AUTO: 7.7 X10E3/UL (ref 3.4–10.8)

## 2019-08-08 NOTE — PROGRESS NOTES
Low vitamin D, advised to take vitamin D3 2000 units once a day for 4 months. Potassium slightly elevated. Advised to avoid potassium rich diet like bananas and citrus food. All other labs are stable.

## 2019-10-07 DIAGNOSIS — J41.0 SMOKERS' COUGH (HCC): ICD-10-CM

## 2019-10-07 RX ORDER — TIOTROPIUM BROMIDE 18 UG/1
CAPSULE ORAL; RESPIRATORY (INHALATION)
Qty: 90 CAP | Refills: 1 | Status: SHIPPED | OUTPATIENT
Start: 2019-10-07 | End: 2020-02-13 | Stop reason: ALTCHOICE

## 2019-10-31 DIAGNOSIS — F31.77 BIPOLAR DISORDER, IN PARTIAL REMISSION, MOST RECENT EPISODE MIXED (HCC): ICD-10-CM

## 2019-10-31 RX ORDER — BUPROPION HYDROCHLORIDE 150 MG/1
TABLET ORAL
Qty: 90 TAB | Refills: 1 | Status: SHIPPED | OUTPATIENT
Start: 2019-10-31 | End: 2022-01-17 | Stop reason: SDUPTHER

## 2020-01-30 DIAGNOSIS — E78.00 PURE HYPERCHOLESTEROLEMIA: ICD-10-CM

## 2020-01-30 DIAGNOSIS — I10 ESSENTIAL HYPERTENSION: ICD-10-CM

## 2020-01-30 RX ORDER — METOPROLOL TARTRATE 50 MG/1
TABLET ORAL
Qty: 180 TAB | Refills: 1 | Status: SHIPPED | OUTPATIENT
Start: 2020-01-30 | End: 2020-08-03

## 2020-01-30 RX ORDER — SIMVASTATIN 20 MG/1
TABLET, FILM COATED ORAL
Qty: 90 TAB | Refills: 1 | Status: SHIPPED | OUTPATIENT
Start: 2020-01-30 | End: 2020-07-30

## 2020-01-30 RX ORDER — LOSARTAN POTASSIUM 100 MG/1
TABLET ORAL
Qty: 90 TAB | Refills: 1 | Status: SHIPPED | OUTPATIENT
Start: 2020-01-30 | End: 2020-07-29

## 2020-02-09 DIAGNOSIS — I10 ESSENTIAL HYPERTENSION: ICD-10-CM

## 2020-02-10 RX ORDER — AMLODIPINE BESYLATE 10 MG/1
TABLET ORAL
Qty: 90 TAB | Refills: 1 | Status: SHIPPED | OUTPATIENT
Start: 2020-02-10 | End: 2020-08-03

## 2020-02-12 RX ORDER — OXCARBAZEPINE 300 MG/1
TABLET, FILM COATED ORAL
Refills: 1 | COMMUNITY
Start: 2019-11-18 | End: 2022-03-21 | Stop reason: SDUPTHER

## 2020-02-13 ENCOUNTER — OFFICE VISIT (OUTPATIENT)
Dept: INTERNAL MEDICINE CLINIC | Age: 65
End: 2020-02-13

## 2020-02-13 VITALS
HEIGHT: 61 IN | HEART RATE: 63 BPM | WEIGHT: 160.8 LBS | BODY MASS INDEX: 30.36 KG/M2 | DIASTOLIC BLOOD PRESSURE: 64 MMHG | TEMPERATURE: 98.2 F | OXYGEN SATURATION: 94 % | SYSTOLIC BLOOD PRESSURE: 130 MMHG | RESPIRATION RATE: 16 BRPM

## 2020-02-13 DIAGNOSIS — F31.77 BIPOLAR DISORDER, IN PARTIAL REMISSION, MOST RECENT EPISODE MIXED (HCC): ICD-10-CM

## 2020-02-13 DIAGNOSIS — M85.88 OSTEOPENIA OF OTHER SITE: ICD-10-CM

## 2020-02-13 DIAGNOSIS — F17.200 SMOKING: ICD-10-CM

## 2020-02-13 DIAGNOSIS — E11.9 TYPE 2 DIABETES MELLITUS WITHOUT COMPLICATION, WITHOUT LONG-TERM CURRENT USE OF INSULIN (HCC): Primary | ICD-10-CM

## 2020-02-13 DIAGNOSIS — E55.9 VITAMIN D DEFICIENCY: ICD-10-CM

## 2020-02-13 DIAGNOSIS — I10 ESSENTIAL HYPERTENSION: ICD-10-CM

## 2020-02-13 RX ORDER — FERROUS SULFATE, DRIED 160(50) MG
1 TABLET, EXTENDED RELEASE ORAL 2 TIMES DAILY WITH MEALS
Qty: 180 TAB | Refills: 4 | Status: SHIPPED | OUTPATIENT
Start: 2020-02-13

## 2020-02-13 NOTE — PROGRESS NOTES
Health Maintenance Due   Topic Date Due    DTaP/Tdap/Td series (1 - Tdap) 07/07/1964    Shingrix Vaccine Age 50> (1 of 2) 07/07/2003    Pneumococcal 65+ years (2 of 2 - PPSV23) 07/07/2018    Influenza Age 5 to Adult  08/01/2019       Chief Complaint   Patient presents with    Hypertension    Diabetes    Cholesterol Problem       1. Have you been to the ER, urgent care clinic since your last visit? Hospitalized since your last visit? No    2. Have you seen or consulted any other health care providers outside of the [address] [place] [address] since your last visit? Include any pap smears or colon screening. No    3) Do you have an Advance Directive on file? no    4) Are you interested in receiving information on Advance Directives? NO      Patient is accompanied by self I have received verbal consent from Naty Barker to discuss any/all medical information while they are present in the room.

## 2020-02-13 NOTE — PROGRESS NOTES
HISTORY OF PRESENT ILLNESS  Janae Flores is a 75 y.o. female here for follow up. Has diabetes. watching diet. On metformin. Eye checkup done she thinks is within 1 year. Need medications refilled. She has bipolar disorder. seeing psych. stable. Seeing psychiatrist Dr. Zulema Britton. .  On multiple medications. Has hypertension, compliant with medicine. No chest pain palpitation or shortness of breath. Refused Pneumonia vaccines. She has COPD and she is still a smoker. Has lot of cough and congestion. Not using Spiriva. Has osteopenia, not taking any calcium. Cholesterol Problem   Pertinent negatives include no chest pain. Hypertension    Pertinent negatives include no chest pain, no palpitations and no blurred vision. Diabetes   Pertinent negatives include no chest pain. COPD   Pertinent negatives include no chest pain. Mental Health Problem   Pertinent negatives include no chest pain. Medication Evaluation   Pertinent negatives include no chest pain. Review of Systems   Constitutional: Negative. Negative for chills and fever. HENT: Negative. Eyes: Negative. Negative for blurred vision and double vision. Respiratory: Positive for cough and wheezing. Cardiovascular: Negative. Negative for chest pain and palpitations. Gastrointestinal: Negative. Genitourinary: Negative. Musculoskeletal: Negative. Neurological: Negative. Psychiatric/Behavioral: Negative. Physical Exam  Constitutional:       General: She is not in acute distress. Appearance: Normal appearance. She is well-developed. She is obese. HENT:      Nose: Nose normal.   Neck:      Musculoskeletal: Normal range of motion and neck supple. Thyroid: No thyromegaly. Vascular: No JVD. Cardiovascular:      Rate and Rhythm: Normal rate and regular rhythm. Heart sounds: Normal heart sounds. Pulmonary:      Effort: Pulmonary effort is normal. No respiratory distress.       Breath sounds: Wheezing present. Comments: Sporadic wheezing and rhonchi present in both lung fields. Abdominal:      General: Bowel sounds are normal.      Palpations: Abdomen is soft. Musculoskeletal:         General: No tenderness. Comments:      Neurological:      Mental Status: She is alert. Psychiatric:         Mood and Affect: Mood normal.         Behavior: Behavior normal.         ASSESSMENT and PLAN  Diagnoses and all orders for this visit:    1. Type 2 diabetes mellitus without complication, without long-term current use of insulin (HCC)    On metformin. Will check,  -     HEMOGLOBIN A1C WITH EAG  -     MICROALBUMIN, UR, RAND W/ MICROALB/CREAT RATIO    2. Essential hypertension    Stable blood pressure. On amlodipine and losartan. -     METABOLIC PANEL, COMPREHENSIVE  -     CBC W/O DIFF    3. Bipolar disorder, in partial remission, most recent episode mixed (HCC)    Seeing Dr. Bárbara Stovall. On Wellbutrin, Prozac and Trileptal.  Will check,  -     METABOLIC PANEL, COMPREHENSIVE    4. Vitamin D deficiency  We will check,   -     VITAMIN D, 25 HYDROXY    5. Osteopenia of other site    Will give,  -     calcium-vitamin D (OYSTER SHELL) 500 mg(1,250mg) -200 unit per tablet; Take 1 Tab by mouth two (2) times daily (with meals). 6. Smoking  She is still smoking. Has a lot of cough and congestion. Will give,  -     umeclidinium-vilanterol (ANORO ELLIPTA) 62.5-25 mcg/actuation inhaler; Take 1 Puff by inhalation daily. DC spiriva              Reports a small cyst on right elbow area. Discussed expected course/resolution/complications of diagnosis in detail with patient. Medication risks/benefits/costs/interactions/alternatives discussed with patient. Pt was given an after visit summary which includes diagnoses, current medications & vitals. Pt expressed understanding with the diagnosis and plan.

## 2020-02-14 LAB
25(OH)D3+25(OH)D2 SERPL-MCNC: 14.5 NG/ML (ref 30–100)
ALBUMIN SERPL-MCNC: 4.4 G/DL (ref 3.8–4.8)
ALBUMIN/CREAT UR: 221 MG/G CREAT (ref 0–29)
ALBUMIN/GLOB SERPL: 1.5 {RATIO} (ref 1.2–2.2)
ALP SERPL-CCNC: 42 IU/L (ref 39–117)
ALT SERPL-CCNC: 14 IU/L (ref 0–32)
AST SERPL-CCNC: 19 IU/L (ref 0–40)
BILIRUB SERPL-MCNC: 0.2 MG/DL (ref 0–1.2)
BUN SERPL-MCNC: 16 MG/DL (ref 8–27)
BUN/CREAT SERPL: 28 (ref 12–28)
CALCIUM SERPL-MCNC: 9.9 MG/DL (ref 8.7–10.3)
CHLORIDE SERPL-SCNC: 101 MMOL/L (ref 96–106)
CO2 SERPL-SCNC: 20 MMOL/L (ref 20–29)
CREAT SERPL-MCNC: 0.57 MG/DL (ref 0.57–1)
CREAT UR-MCNC: 38.4 MG/DL
ERYTHROCYTE [DISTWIDTH] IN BLOOD BY AUTOMATED COUNT: 12.2 % (ref 11.7–15.4)
EST. AVERAGE GLUCOSE BLD GHB EST-MCNC: 103 MG/DL
GLOBULIN SER CALC-MCNC: 2.9 G/DL (ref 1.5–4.5)
GLUCOSE SERPL-MCNC: 79 MG/DL (ref 65–99)
HBA1C MFR BLD: 5.2 % (ref 4.8–5.6)
HCT VFR BLD AUTO: 41 % (ref 34–46.6)
HGB BLD-MCNC: 14.4 G/DL (ref 11.1–15.9)
MCH RBC QN AUTO: 28.7 PG (ref 26.6–33)
MCHC RBC AUTO-ENTMCNC: 35.1 G/DL (ref 31.5–35.7)
MCV RBC AUTO: 82 FL (ref 79–97)
MICROALBUMIN UR-MCNC: 84.9 UG/ML
PLATELET # BLD AUTO: 376 X10E3/UL (ref 150–450)
POTASSIUM SERPL-SCNC: 4.2 MMOL/L (ref 3.5–5.2)
PROT SERPL-MCNC: 7.3 G/DL (ref 6–8.5)
RBC # BLD AUTO: 5.02 X10E6/UL (ref 3.77–5.28)
SODIUM SERPL-SCNC: 139 MMOL/L (ref 134–144)
WBC # BLD AUTO: 9 X10E3/UL (ref 3.4–10.8)

## 2020-02-19 DIAGNOSIS — E55.9 VITAMIN D DEFICIENCY: Primary | ICD-10-CM

## 2020-02-19 RX ORDER — ERGOCALCIFEROL 1.25 MG/1
50000 CAPSULE ORAL
Qty: 4 CAP | Refills: 3 | Status: SHIPPED | OUTPATIENT
Start: 2020-02-19 | End: 2022-10-20 | Stop reason: ALTCHOICE

## 2020-02-23 DIAGNOSIS — E11.9 TYPE 2 DIABETES MELLITUS WITHOUT COMPLICATION, WITHOUT LONG-TERM CURRENT USE OF INSULIN (HCC): ICD-10-CM

## 2020-02-24 RX ORDER — METFORMIN HYDROCHLORIDE 500 MG/1
TABLET ORAL
Qty: 180 TAB | Refills: 1 | Status: SHIPPED | OUTPATIENT
Start: 2020-02-24 | End: 2020-08-28

## 2020-03-04 ENCOUNTER — APPOINTMENT (OUTPATIENT)
Dept: GENERAL RADIOLOGY | Age: 65
End: 2020-03-04
Attending: EMERGENCY MEDICINE
Payer: MEDICARE

## 2020-03-04 ENCOUNTER — HOSPITAL ENCOUNTER (EMERGENCY)
Age: 65
Discharge: HOME | End: 2020-03-05
Attending: EMERGENCY MEDICINE
Payer: MEDICARE

## 2020-03-04 DIAGNOSIS — J44.1 COPD EXACERBATION (HCC): Primary | ICD-10-CM

## 2020-03-04 DIAGNOSIS — J06.9 UPPER RESPIRATORY TRACT INFECTION, UNSPECIFIED TYPE: ICD-10-CM

## 2020-03-04 LAB
DEPRECATED S PYO AG THROAT QL EIA: NEGATIVE
FLUAV AG NPH QL IA: NEGATIVE
FLUBV AG NOSE QL IA: NEGATIVE

## 2020-03-04 PROCEDURE — 74011000250 HC RX REV CODE- 250: Performed by: EMERGENCY MEDICINE

## 2020-03-04 PROCEDURE — 87147 CULTURE TYPE IMMUNOLOGIC: CPT

## 2020-03-04 PROCEDURE — 77030029684 HC NEB SM VOL KT MONA -A

## 2020-03-04 PROCEDURE — 74011250636 HC RX REV CODE- 250/636: Performed by: EMERGENCY MEDICINE

## 2020-03-04 PROCEDURE — 96372 THER/PROPH/DIAG INJ SC/IM: CPT

## 2020-03-04 PROCEDURE — 87804 INFLUENZA ASSAY W/OPTIC: CPT

## 2020-03-04 PROCEDURE — 94640 AIRWAY INHALATION TREATMENT: CPT

## 2020-03-04 PROCEDURE — 87880 STREP A ASSAY W/OPTIC: CPT

## 2020-03-04 PROCEDURE — 99285 EMERGENCY DEPT VISIT HI MDM: CPT

## 2020-03-04 PROCEDURE — 71045 X-RAY EXAM CHEST 1 VIEW: CPT

## 2020-03-04 PROCEDURE — 93005 ELECTROCARDIOGRAM TRACING: CPT

## 2020-03-04 PROCEDURE — 87070 CULTURE OTHR SPECIMN AEROBIC: CPT

## 2020-03-04 RX ORDER — LEVOFLOXACIN 750 MG/1
750 TABLET ORAL DAILY
Qty: 7 TAB | Refills: 0 | Status: SHIPPED | OUTPATIENT
Start: 2020-03-04 | End: 2020-03-11

## 2020-03-04 RX ORDER — IPRATROPIUM BROMIDE AND ALBUTEROL SULFATE 2.5; .5 MG/3ML; MG/3ML
3 SOLUTION RESPIRATORY (INHALATION)
Status: COMPLETED | OUTPATIENT
Start: 2020-03-04 | End: 2020-03-04

## 2020-03-04 RX ORDER — AZITHROMYCIN 500 MG/1
500 TABLET, FILM COATED ORAL
Status: DISCONTINUED | OUTPATIENT
Start: 2020-03-04 | End: 2020-03-04

## 2020-03-04 RX ORDER — PREDNISONE 20 MG/1
60 TABLET ORAL DAILY
Qty: 15 TAB | Refills: 0 | Status: SHIPPED | OUTPATIENT
Start: 2020-03-04 | End: 2020-03-09

## 2020-03-04 RX ORDER — MAGNESIUM SULFATE HEPTAHYDRATE 40 MG/ML
2 INJECTION, SOLUTION INTRAVENOUS
Status: DISCONTINUED | OUTPATIENT
Start: 2020-03-04 | End: 2020-03-04

## 2020-03-04 RX ORDER — NAPROXEN 500 MG/1
500 TABLET ORAL 2 TIMES DAILY WITH MEALS
Qty: 20 TAB | Refills: 0 | Status: SHIPPED | OUTPATIENT
Start: 2020-03-04 | End: 2020-03-14

## 2020-03-04 RX ORDER — DEXAMETHASONE SODIUM PHOSPHATE 10 MG/ML
10 INJECTION INTRAMUSCULAR; INTRAVENOUS
Status: COMPLETED | OUTPATIENT
Start: 2020-03-04 | End: 2020-03-04

## 2020-03-04 RX ORDER — IPRATROPIUM BROMIDE AND ALBUTEROL SULFATE 2.5; .5 MG/3ML; MG/3ML
6 SOLUTION RESPIRATORY (INHALATION)
Status: COMPLETED | OUTPATIENT
Start: 2020-03-04 | End: 2020-03-04

## 2020-03-04 RX ORDER — ALBUTEROL SULFATE 1.25 MG/3ML
1.25 SOLUTION RESPIRATORY (INHALATION)
Qty: 25 EACH | Refills: 0 | Status: SHIPPED | OUTPATIENT
Start: 2020-03-04 | End: 2022-10-20 | Stop reason: SDUPTHER

## 2020-03-04 RX ORDER — NEBULIZER AND COMPRESSOR
1 EACH MISCELLANEOUS
Qty: 1 EACH | Refills: 0 | Status: SHIPPED | OUTPATIENT
Start: 2020-03-04

## 2020-03-04 RX ORDER — ALBUTEROL SULFATE 0.83 MG/ML
5 SOLUTION RESPIRATORY (INHALATION)
Status: COMPLETED | OUTPATIENT
Start: 2020-03-04 | End: 2020-03-04

## 2020-03-04 RX ORDER — ALBUTEROL SULFATE 90 UG/1
2 AEROSOL, METERED RESPIRATORY (INHALATION)
Qty: 1 INHALER | Refills: 0 | Status: SHIPPED | OUTPATIENT
Start: 2020-03-04 | End: 2022-10-20 | Stop reason: SDUPTHER

## 2020-03-04 RX ADMIN — DEXAMETHASONE SODIUM PHOSPHATE 10 MG: 10 INJECTION, EMULSION INTRAMUSCULAR; INTRAVENOUS at 22:44

## 2020-03-04 RX ADMIN — ALBUTEROL SULFATE 5 MG: 2.5 SOLUTION RESPIRATORY (INHALATION) at 23:20

## 2020-03-04 RX ADMIN — IPRATROPIUM BROMIDE AND ALBUTEROL SULFATE 6 ML: .5; 3 SOLUTION RESPIRATORY (INHALATION) at 21:37

## 2020-03-04 RX ADMIN — IPRATROPIUM BROMIDE AND ALBUTEROL SULFATE 3 ML: .5; 3 SOLUTION RESPIRATORY (INHALATION) at 21:36

## 2020-03-05 VITALS
DIASTOLIC BLOOD PRESSURE: 63 MMHG | RESPIRATION RATE: 26 BRPM | SYSTOLIC BLOOD PRESSURE: 124 MMHG | HEIGHT: 64 IN | HEART RATE: 80 BPM | WEIGHT: 162 LBS | OXYGEN SATURATION: 95 % | BODY MASS INDEX: 27.66 KG/M2 | TEMPERATURE: 97.9 F

## 2020-03-05 LAB
ATRIAL RATE: 81 BPM
CALCULATED P AXIS, ECG09: 68 DEGREES
CALCULATED R AXIS, ECG10: -35 DEGREES
CALCULATED T AXIS, ECG11: 93 DEGREES
DIAGNOSIS, 93000: NORMAL
P-R INTERVAL, ECG05: 154 MS
Q-T INTERVAL, ECG07: 404 MS
QRS DURATION, ECG06: 134 MS
QTC CALCULATION (BEZET), ECG08: 469 MS
VENTRICULAR RATE, ECG03: 81 BPM

## 2020-03-05 PROCEDURE — 74011250637 HC RX REV CODE- 250/637: Performed by: EMERGENCY MEDICINE

## 2020-03-05 RX ORDER — CODEINE PHOSPHATE AND GUAIFENESIN 10; 100 MG/5ML; MG/5ML
5 SOLUTION ORAL
Qty: 120 ML | Refills: 0 | Status: SHIPPED | OUTPATIENT
Start: 2020-03-05 | End: 2020-03-13

## 2020-03-05 RX ORDER — HYDROCODONE BITARTRATE AND ACETAMINOPHEN 5; 325 MG/1; MG/1
1 TABLET ORAL
Status: DISCONTINUED | OUTPATIENT
Start: 2020-03-05 | End: 2020-03-05

## 2020-03-05 RX ORDER — HYDROCODONE BITARTRATE AND ACETAMINOPHEN 5; 325 MG/1; MG/1
2 TABLET ORAL
Status: COMPLETED | OUTPATIENT
Start: 2020-03-05 | End: 2020-03-05

## 2020-03-05 RX ADMIN — HYDROCODONE BITARTRATE AND ACETAMINOPHEN 2 TABLET: 5; 325 TABLET ORAL at 00:25

## 2020-03-05 NOTE — ED NOTES
Patient  given copy of dc instructions and 6 script(s). Patient  verbalized understanding of instructions and script (s). Patient given a current medication reconciliation form and verbalized understanding of their medications. Patient verbalized understanding of the importance of discussing medications with  his or her physician or clinic they will be following up with. Patient alert and oriented and in no acute distress. Patient discharged home ambulatory with family  Giovanni sheets.

## 2020-03-05 NOTE — DISCHARGE INSTRUCTIONS
Patient Education        COPD Exacerbation Plan: Care Instructions  Your Care Instructions    If you have chronic obstructive pulmonary disease (COPD), your usual shortness of breath could suddenly get worse. You may start coughing more and have more mucus. This flare-up is called a COPD exacerbation (say \"et-OYH-bd-BAY-patsy\"). A lung infection or air pollution could set off an exacerbation. Sometimes it can happen after a quick change in temperature or being around chemicals. Work with your doctor to make a plan for dealing with an exacerbation. You can better manage it if you plan ahead. Follow-up care is a key part of your treatment and safety. Be sure to make and go to all appointments, and call your doctor if you are having problems. It's also a good idea to know your test results and keep a list of the medicines you take. How can you care for yourself at home? During an exacerbation  · Do not panic if you start to have one. Quick treatment at home may help you prevent serious breathing problems. If you have a COPD exacerbation plan that you developed with your doctor, follow it. · Take your medicines exactly as your doctor tells you.  ? Use your inhaler as directed by your doctor. If your symptoms do not get better after you use your medicine, have someone take you to the emergency room. Call an ambulance if necessary. ? With inhaled medicines, a spacer or a nebulizer may help you get more medicine to your lungs. Ask your doctor or pharmacist how to use them properly. Practice using the spacer in front of a mirror before you have an exacerbation. This may help you get the medicine into your lungs quickly. ? If your doctor has given you steroid pills, take them as directed. ? Your doctor may have given you a prescription for antibiotics, which you can fill if you need it. ? Talk to your doctor if you have any problems with your medicine.  And call your doctor if you have to use your antibiotic or steroid pills. Preventing an exacerbation  · Do not smoke. This is the most important step you can take to prevent more damage to your lungs and prevent problems. If you already smoke, it is never too late to stop. If you need help quitting, talk to your doctor about stop-smoking programs and medicines. These can increase your chances of quitting for good. · Take your daily medicines as prescribed. · Avoid colds and flu. ? Get a pneumococcal vaccine. ? Get a flu vaccine each year, as soon as it is available. Ask those you live or work with to do the same, so they will not get the flu and infect you. ? Try to stay away from people with colds or the flu. ? Wash your hands often. · Avoid secondhand smoke; air pollution; cold, dry air; hot, humid air; and high altitudes. Stay at home with your windows closed when air pollution is bad. · Learn breathing techniques for COPD, such as breathing through pursed lips. These techniques can help you breathe easier during an exacerbation. When should you call for help? Call 911 anytime you think you may need emergency care. For example, call if:    · You have severe trouble breathing.     · You have severe chest pain.    Call your doctor now or seek immediate medical care if:    · You have new or worse shortness of breath.     · You develop new chest pain.     · You are coughing more deeply or more often, especially if you notice more mucus or a change in the color of your mucus.     · You cough up blood.     · You have new or increased swelling in your legs or belly.     · You have a fever.    Watch closely for changes in your health, and be sure to contact your doctor if:    · You need to use your antibiotic or steroid pills.     · Your symptoms are getting worse. Where can you learn more? Go to http://duncan-niles.info/. Enter B146 in the search box to learn more about \"COPD Exacerbation Plan: Care Instructions. \"  Current as of: June 9, 2019  Content Version: 12.2  © 2704-0777 [employer]. Care instructions adapted under license by [employer] (which disclaims liability or warranty for this information). If you have questions about a medical condition or this instruction, always ask your healthcare professional. [address] any warranty or liability for your use of this information. Patient Education        Upper Respiratory Infection (Cold): Care Instructions  Your Care Instructions    An upper respiratory infection, or URI, is an infection of the nose, sinuses, or throat. URIs are spread by coughs, sneezes, and direct contact. The common cold is the most frequent kind of URI. The flu and sinus infections are other kinds of URIs. Almost all URIs are caused by viruses. Antibiotics won't cure them. But you can treat most infections with home care. This may include drinking lots of fluids and taking over-the-counter pain medicine. You will probably feel better in 4 to 10 days. The doctor has checked you carefully, but problems can develop later. If you notice any problems or new symptoms, get medical treatment right away. Follow-up care is a key part of your treatment and safety. Be sure to make and go to all appointments, and call your doctor if you are having problems. It's also a good idea to know your test results and keep a list of the medicines you take. How can you care for yourself at home? · To prevent dehydration, drink plenty of fluids, enough so that your urine is light yellow or clear like water. Choose water and other caffeine-free clear liquids until you feel better. If you have kidney, heart, or liver disease and have to limit fluids, talk with your doctor before you increase the amount of fluids you drink. · Take an over-the-counter pain medicine, such as acetaminophen (Tylenol), ibuprofen (Advil, Motrin), or naproxen (Aleve).  Read and follow all instructions on the label.  · Before you use cough and cold medicines, check the label. These medicines may not be safe for young children or for people with certain health problems. · Be careful when taking over-the-counter cold or flu medicines and Tylenol at the same time. Many of these medicines have acetaminophen, which is Tylenol. Read the labels to make sure that you are not taking more than the recommended dose. Too much acetaminophen (Tylenol) can be harmful. · Get plenty of rest.  · Do not smoke or allow others to smoke around you. If you need help quitting, talk to your doctor about stop-smoking programs and medicines. These can increase your chances of quitting for good. When should you call for help? Call 911 anytime you think you may need emergency care. For example, call if:    · You have severe trouble breathing.    Call your doctor now or seek immediate medical care if:    · You seem to be getting much sicker.     · You have new or worse trouble breathing.     · You have a new or higher fever.     · You have a new rash.    Watch closely for changes in your health, and be sure to contact your doctor if:    · You have a new symptom, such as a sore throat, an earache, or sinus pain.     · You cough more deeply or more often, especially if you notice more mucus or a change in the color of your mucus.     · You do not get better as expected. Where can you learn more? Go to http://duncan-niles.info/. Enter R667 in the search box to learn more about \"Upper Respiratory Infection (Cold): Care Instructions. \"  Current as of: June 9, 2019  Content Version: 12.2  © 8738-8276 [employer]. Care instructions adapted under license by [employer] (which disclaims liability or warranty for this information).  If you have questions about a medical condition or this instruction, always ask your healthcare professional. [address] any warranty or liability for your use of this information. Patient Education        Sore Throat: Care Instructions  Your Care Instructions    Infection by bacteria or a virus causes most sore throats. Cigarette smoke, dry air, air pollution, allergies, and yelling can also cause a sore throat. Sore throats can be painful and annoying. Fortunately, most sore throats go away on their own. If you have a bacterial infection, your doctor may prescribe antibiotics. Follow-up care is a key part of your treatment and safety. Be sure to make and go to all appointments, and call your doctor if you are having problems. It's also a good idea to know your test results and keep a list of the medicines you take. How can you care for yourself at home? · If your doctor prescribed antibiotics, take them as directed. Do not stop taking them just because you feel better. You need to take the full course of antibiotics. · Gargle with warm salt water once an hour to help reduce swelling and relieve discomfort. Use 1 teaspoon of salt mixed in 1 cup of warm water. · Take an over-the-counter pain medicine, such as acetaminophen (Tylenol), ibuprofen (Advil, Motrin), or naproxen (Aleve). Read and follow all instructions on the label. · Be careful when taking over-the-counter cold or flu medicines and Tylenol at the same time. Many of these medicines have acetaminophen, which is Tylenol. Read the labels to make sure that you are not taking more than the recommended dose. Too much acetaminophen (Tylenol) can be harmful. · Drink plenty of fluids. Fluids may help soothe an irritated throat. Hot fluids, such as tea or soup, may help decrease throat pain. · Use over-the-counter throat lozenges to soothe pain. Regular cough drops or hard candy may also help. These should not be given to young children because of the risk of choking. · Do not smoke or allow others to smoke around you.  If you need help quitting, talk to your doctor about stop-smoking programs and medicines. These can increase your chances of quitting for good. · Use a vaporizer or humidifier to add moisture to your bedroom. Follow the directions for cleaning the machine. When should you call for help? Call your doctor now or seek immediate medical care if:    · You have new or worse trouble swallowing.     · Your sore throat gets much worse on one side.    Watch closely for changes in your health, and be sure to contact your doctor if you do not get better as expected. Where can you learn more? Go to http://duncan-niles.info/. Enter 062 [id number] 80 [id number] in the search box to learn more about \"Sore Throat: Care Instructions. \"  Current as of: October 21, 2018  Content Version: 12.2  © 9489-3842 [employer]. Care instructions adapted under license by [employer] (which disclaims liability or warranty for this information). If you have questions about a medical condition or this instruction, always ask your healthcare professional. [place] [address] any warranty or liability for your use of this information.

## 2020-03-05 NOTE — ED NOTES
Several nurses tried (3) unsuccessfully to obtain iv access. Pt refused additional attempts.   Provider notified

## 2020-03-05 NOTE — ED PROVIDER NOTES
See 8year-old female with a history of COPD, depression, diabetes, high cholesterol, hypertension, left bundle branch block, bipolar, thyroid presents with cough productive of \"thick dark brown phlegm\", shortness of breath, sore throat, and bilateral chest wall pain with coughing since yesterday. Also reports subjective fever and wheeze. Did not get flu shot this year. Denies known flu contacts. Denies pedal edema. Denies other pain. Shortness of Breath   Associated symptoms include rhinorrhea, sore throat, cough, wheezing and chest pain. Pertinent negatives include no fever, no headaches, no abdominal pain and no leg swelling. Cold Symptoms    Associated symptoms include chest pain, congestion, rhinorrhea, sore throat, cough and wheezing. Pertinent negatives include no abdominal pain, no diarrhea, no nausea, no dysuria and no headaches.         Past Medical History:   Diagnosis Date    COPD     early stage of emphysema    Depression     bipolar disease    [facility] [address] [facility]     Diabetes ([facility] [address] [facility]     for 15 years    Hypercholesterolemia     Hypertension     LBBB (left bundle branch block)     Psychiatric disorder     BIpolar    Renal cyst, right     left kidney in pelvis    Thyroid disease     thyroid nodule await for biopsy       Past Surgical History:   Procedure Laterality Date    BREAST SURGERY PROCEDURE UNLISTED      breast cyst removed from left breast    HX BREAST BIOPSY Right     cyst removed at age 22         Family History:   Problem Relation Age of Onset    Diabetes Mother     Hypertension Mother    Bettyjo Haus Elevated Lipids Mother     Cancer Mother         breast ac    Breast Cancer Mother     Cancer Father         tongue ca    Diabetes Sister     Breast Cancer Sister     Heart Disease Brother        Social History     Socioeconomic History    Marital status: SINGLE     Spouse name: Not on file    Number of children: Not on file    Years of education: Not on file    Highest education level: Not on file   Occupational History    Not on file   Social Needs    Financial resource strain: Not on file    Food insecurity:     Worry: Not on file     Inability: Not on file    Transportation needs:     Medical: Not on file     Non-medical: Not on file   Tobacco Use    Smoking status: Current Every Day Smoker     Packs/day: 1.00     Years: 30.00     Pack years: 30.00     Types: Cigarettes    Smokeless tobacco: Never Used   Substance and Sexual Activity    Alcohol use: No     Comment: 1 beer per week. 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,devorced,no children   Lifestyle    Physical activity:     Days per week: Not on file     Minutes per session: Not on file    Stress: Not on file   Relationships    Social connections:     Talks on phone: Not on file     Gets together: Not on file     Attends Congregational service: Not on file     Active member of club or organization: Not on file     Attends meetings of clubs or organizations: Not on file     Relationship status: Not on file    Intimate partner violence:     Fear of current or ex partner: Not on file     Emotionally abused: Not on file     Physically abused: Not on file     Forced sexual activity: Not on file   Other Topics Concern    Not on file   Social History Narrative    ** Merged History Encounter **              ALLERGIES: Pcn [penicillins] and Pcn [penicillins]    Review of Systems   Constitutional: Negative. Negative for chills, fever and unexpected weight change. HENT: Positive for congestion, rhinorrhea and sore throat. Negative for trouble swallowing. Eyes: Negative for discharge. Respiratory: Positive for cough, shortness of breath and wheezing. Negative for chest tightness. Cardiovascular: Positive for chest pain. Negative for leg swelling. Gastrointestinal: Negative.   Negative for abdominal distention, abdominal pain, constipation, diarrhea and nausea. Endocrine: Negative. Genitourinary: Negative. Negative for difficulty urinating, dysuria, frequency and urgency. Musculoskeletal: Negative. Negative for arthralgias and myalgias. Skin: Negative. Negative for color change. Allergic/Immunologic: Negative. Neurological: Negative. Negative for dizziness, speech difficulty and headaches. Hematological: Negative. Psychiatric/Behavioral: Negative. Negative for agitation and confusion. All other systems reviewed and are negative. Vitals:    03/04/20 2105   BP: (!) 142/110   Pulse: 78   Resp: 18   Temp: 99 °F (37.2 °C)   SpO2: 94%   Weight: 73.5 kg (162 lb)   Height: 5' 3.5\" (1.613 m)            Physical Exam  Vitals signs reviewed. Constitutional:       Appearance: She is well-developed. HENT:      Head: Normocephalic and atraumatic. Nose: Congestion present. Mouth/Throat:      Pharynx: Posterior oropharyngeal erythema present. Eyes:      Conjunctiva/sclera: Conjunctivae normal.   Neck:      Musculoskeletal: Neck supple. No neck rigidity. Cardiovascular:      Rate and Rhythm: Normal rate and regular rhythm. Pulmonary:      Effort: Pulmonary effort is normal. No tachypnea, accessory muscle usage or respiratory distress. Breath sounds: Examination of the right-lower field reveals wheezing. Examination of the left-lower field reveals wheezing. Wheezing present. Comments: Prolonged expiratory phase  Abdominal:      Palpations: Abdomen is soft. Tenderness: There is no abdominal tenderness. Musculoskeletal: Normal range of motion. General: No deformity. Lymphadenopathy:      Cervical: Cervical adenopathy present. Skin:     General: Skin is warm and dry. Neurological:      Mental Status: She is alert and oriented to person, place, and time. Psychiatric:         Behavior: Behavior normal.         Thought Content:  Thought content normal.          MDM  Number of Diagnoses or Management Options  COPD exacerbation ([facility] [address]):   Upper respiratory tract infection, unspecified type:   Diagnosis management comments: COPD exacerbation, bronchitis, pneumonia, strep throat, viral syndrome, URI, influenza    ED Course as of Mar 05 0050   Wed Mar 04, 2020   2313 Patient satting 96% on room air. Decreased wheeze. Increased air movement. Only used home inhaler once today. She just got Decadron 20 minutes ago. Will give more nebs and continue to monitor. Plan discharge home on steroids, antibiotics for COPD exacerbation, nebs. [SS]   u Mar 05, 2020   0015 Currently greatest complaint is throat pain. Will provide additional analgesia prior to DC then discharge home    [SS]      ED Course User Index  [SS] Vielka Nails MD       Procedures    ekg done at 21:16 - nsr, rate 81. Lbbb. No change from prior. No ectopy. LABORATORY TESTS:  Recent Results (from the past 12 hour(s))   EKG, 12 LEAD, INITIAL    Collection Time: 03/04/20  9:16 PM   Result Value Ref Range    Ventricular Rate 81 BPM    Atrial Rate 81 BPM    P-R Interval 154 ms    QRS Duration 134 ms    Q-T Interval 404 ms    QTC Calculation (Bezet) 469 ms    Calculated P Axis 68 degrees    Calculated R Axis -35 degrees    Calculated T Axis 93 degrees    Diagnosis       Normal sinus rhythm  Left axis deviation  Left bundle branch block  Abnormal ECG  When compared with ECG of 25-FEB-2011 21:05,  No significant change was found     INFLUENZA A+B VIRAL AGS    Collection Time: 03/04/20 10:05 PM   Result Value Ref Range    Influenza A Antigen NEGATIVE  NEG      Influenza B Antigen NEGATIVE  NEG     STREP AG SCREEN, GROUP A    Collection Time: 03/04/20 10:05 PM   Result Value Ref Range    Group A Strep Ag ID NEGATIVE  NEG         IMAGING RESULTS:  XR CHEST PORT   Final Result   IMPRESSION: No acute cardiopulmonary disease.              MEDICATIONS GIVEN:  Medications   albuterol-ipratropium (DUO-NEB) 2.5 MG-0.5 MG/3 ML (3 mL Nebulization Given 3/4/20 2136)   albuterol-ipratropium (DUO-NEB) 2.5 MG-0.5 MG/3 ML (6 mL Nebulization Given 3/4/20 2137)   dexamethasone (PF) (DECADRON) injection 10 mg (10 mg IntraMUSCular Given 3/4/20 2244)   albuterol (PROVENTIL VENTOLIN) nebulizer solution 5 mg (5 mg Nebulization Given 3/4/20 2320)   HYDROcodone-acetaminophen (NORCO) 5-325 mg per tablet 2 Tab (2 Tabs Oral Given 3/5/20 0025)       IMPRESSION:  1. COPD exacerbation ([address]    2. Upper respiratory tract infection, unspecified type        PLAN:  1. Current Discharge Medication List      START taking these medications    Details   guaiFENesin-codeine (ROBITUSSIN AC) 100-10 mg/5 mL solution Take 5 mL by mouth three (3) times daily as needed for Cough for up to 8 days. Max Daily Amount: 15 mL. Qty: 120 mL, Refills: 0    Associated Diagnoses: Upper respiratory tract infection, unspecified type      predniSONE (DELTASONE) 20 mg tablet Take 60 mg by mouth daily for 5 days. Qty: 15 Tab, Refills: 0      levoFLOXacin (LEVAQUIN) 750 mg tablet Take 1 Tab by mouth daily for 7 days. Qty: 7 Tab, Refills: 0      Nebulizer & Compressor machine 1 Each by Does Not Apply route every four (4) hours as needed for Wheezing. As directed  Qty: 1 Each, Refills: 0      albuterol (ACCUNEB) 1.25 mg/3 mL nebu Take 3 mL by inhalation every four (4) hours as needed for Wheezing (wheezing). Qty: 25 Each, Refills: 0      albuterol (PROVENTIL HFA, VENTOLIN HFA, PROAIR HFA) 90 mcg/actuation inhaler Take 2 Puffs by inhalation every four (4) hours as needed for Wheezing. Qty: 1 Inhaler, Refills: 0      naproxen (NAPROSYN) 500 mg tablet Take 1 Tab by mouth two (2) times daily (with meals) for 10 days. Qty: 20 Tab, Refills: 0      guaiFENesin 200 mg/5 mL liqd Take 10 mL by mouth four (4) times daily as needed for Cough. Qty: 120 mL, Refills: 0           2.    Follow-up Information     Follow up With Specialties Details Why [address] [facility] EMERGENCY DEPT Emergency Medicine  As needed, If symptoms worsen [address] [facility]  Schedule an appointment as soon as possible for a visit to establish primary ccare as needed [address]  [place], [address]  [phone]        Return to ED if worse

## 2020-03-05 NOTE — ED NOTES
Pt arrived to ED  with c/o dyspnea with productive cough of yellow and green sputum x 2 weeks. Pt reports non-radiating chest pain. lss wheezing and congested bilateral. Pt is in no acute distress. Will continue to monitor. See nursing assessment. Safety precautions in place; call light within reach. Emergency Department Nursing Plan of Care       The Nursing Plan of Care is developed from the Nursing assessment and Emergency Department Attending provider initial evaluation. The plan of care may be reviewed in the ED Provider note.     The Plan of Care was developed with the following considerations:   Patient / Family readiness to learn indicated by:verbalized understanding  Persons(s) to be included in education: patient  Barriers to Learning/Limitations:No    Signed     Mauro Cook RN    3/4/2020   9:33 PM

## 2020-03-05 NOTE — ED NOTES
Patient has been instructed that they have been given norco 5/325 mg which contains opioids, benzodiazepines, or other sedating drugs. Patient is aware that they  will need to refrain from driving or operating heavy machinery after taking this medication. Patient also instructed that they need to avoid drinking alcohol and using other products containing opioids, benzodiazepines, or other sedating drugs. Patient verbalized understanding.

## 2020-03-07 LAB
BACTERIA SPEC CULT: ABNORMAL
BACTERIA SPEC CULT: ABNORMAL
SERVICE CMNT-IMP: ABNORMAL

## 2020-03-07 NOTE — PROGRESS NOTES
Spoke with patient on the phone. Endorses she is taking Levaquin as prescribed. Patient is penicillin allergic and per up-to-date research, group B strep is susceptible to Levaquin in this region. Low resistance rates. Educated patient to follow-up with PCP should symptoms persist or worsen. Patient agrees with treatment plan.

## 2020-03-08 ENCOUNTER — HOSPITAL ENCOUNTER (EMERGENCY)
Age: 65
Discharge: HOME | End: 2020-03-08
Attending: EMERGENCY MEDICINE
Payer: MEDICARE

## 2020-03-08 ENCOUNTER — APPOINTMENT (OUTPATIENT)
Dept: GENERAL RADIOLOGY | Age: 65
End: 2020-03-08
Attending: PHYSICIAN ASSISTANT
Payer: MEDICARE

## 2020-03-08 ENCOUNTER — APPOINTMENT (OUTPATIENT)
Dept: CT IMAGING | Age: 65
End: 2020-03-08
Attending: PHYSICIAN ASSISTANT
Payer: MEDICARE

## 2020-03-08 VITALS
BODY MASS INDEX: 26.72 KG/M2 | OXYGEN SATURATION: 96 % | WEIGHT: 156.5 LBS | TEMPERATURE: 97.6 F | HEIGHT: 64 IN | HEART RATE: 67 BPM | RESPIRATION RATE: 15 BRPM | DIASTOLIC BLOOD PRESSURE: 68 MMHG | SYSTOLIC BLOOD PRESSURE: 152 MMHG

## 2020-03-08 DIAGNOSIS — I44.7 LBBB (LEFT BUNDLE BRANCH BLOCK): ICD-10-CM

## 2020-03-08 DIAGNOSIS — E87.8 HYPOCHLOREMIA: ICD-10-CM

## 2020-03-08 DIAGNOSIS — J43.9 PULMONARY EMPHYSEMA, UNSPECIFIED EMPHYSEMA TYPE (HCC): Primary | ICD-10-CM

## 2020-03-08 DIAGNOSIS — E87.1 HYPONATREMIA: ICD-10-CM

## 2020-03-08 DIAGNOSIS — Z86.39 HISTORY OF DIABETES MELLITUS: ICD-10-CM

## 2020-03-08 LAB
ALBUMIN SERPL-MCNC: 3.4 G/DL (ref 3.5–5)
ALBUMIN/GLOB SERPL: 0.8 {RATIO} (ref 1.1–2.2)
ALP SERPL-CCNC: 89 U/L (ref 45–117)
ALT SERPL-CCNC: 14 U/L (ref 12–78)
ANION GAP SERPL CALC-SCNC: 13 MMOL/L (ref 5–15)
AST SERPL-CCNC: 10 U/L (ref 15–37)
BASOPHILS # BLD: 0 K/UL (ref 0–0.1)
BASOPHILS NFR BLD: 0 % (ref 0–1)
BILIRUB SERPL-MCNC: 0.2 MG/DL (ref 0.2–1)
BUN SERPL-MCNC: 6 MG/DL (ref 6–20)
BUN/CREAT SERPL: 10 (ref 12–20)
CALCIUM SERPL-MCNC: 9.1 MG/DL (ref 8.5–10.1)
CHLORIDE SERPL-SCNC: 88 MMOL/L (ref 97–108)
CK SERPL-CCNC: 25 U/L (ref 26–192)
CO2 SERPL-SCNC: 25 MMOL/L (ref 21–32)
CREAT SERPL-MCNC: 0.62 MG/DL (ref 0.55–1.02)
DIFFERENTIAL METHOD BLD: ABNORMAL
EOSINOPHIL # BLD: 0 K/UL (ref 0–0.4)
EOSINOPHIL NFR BLD: 0 % (ref 0–7)
ERYTHROCYTE [DISTWIDTH] IN BLOOD BY AUTOMATED COUNT: 11.8 % (ref 11.5–14.5)
GLOBULIN SER CALC-MCNC: 4.4 G/DL (ref 2–4)
GLUCOSE BLD STRIP.AUTO-MCNC: 183 MG/DL (ref 65–100)
GLUCOSE SERPL-MCNC: 176 MG/DL (ref 65–100)
HCT VFR BLD AUTO: 46.2 % (ref 35–47)
HGB BLD-MCNC: 16.8 G/DL (ref 11.5–16)
IMM GRANULOCYTES # BLD AUTO: 0.1 K/UL (ref 0–0.04)
IMM GRANULOCYTES NFR BLD AUTO: 1 % (ref 0–0.5)
LIPASE SERPL-CCNC: 115 U/L (ref 73–393)
LYMPHOCYTES # BLD: 0.9 K/UL (ref 0.8–3.5)
LYMPHOCYTES NFR BLD: 8 % (ref 12–49)
MCH RBC QN AUTO: 34.5 PG (ref 26–34)
MCHC RBC AUTO-ENTMCNC: 36.4 G/DL (ref 30–36.5)
MCV RBC AUTO: 94.9 FL (ref 80–99)
MONOCYTES # BLD: 0.8 K/UL (ref 0–1)
MONOCYTES NFR BLD: 7 % (ref 5–13)
NEUTS SEG # BLD: 9.2 K/UL (ref 1.8–8)
NEUTS SEG NFR BLD: 84 % (ref 32–75)
NRBC # BLD: 0 K/UL (ref 0–0.01)
NRBC BLD-RTO: 0 PER 100 WBC
PLATELET # BLD AUTO: 334 K/UL (ref 150–400)
PMV BLD AUTO: 8.2 FL (ref 8.9–12.9)
POTASSIUM SERPL-SCNC: 3.7 MMOL/L (ref 3.5–5.1)
PROT SERPL-MCNC: 7.8 G/DL (ref 6.4–8.2)
RBC # BLD AUTO: 4.87 M/UL (ref 3.8–5.2)
SERVICE CMNT-IMP: ABNORMAL
SODIUM SERPL-SCNC: 126 MMOL/L (ref 136–145)
TROPONIN I SERPL-MCNC: <0.05 NG/ML
WBC # BLD AUTO: 11.1 K/UL (ref 3.6–11)

## 2020-03-08 PROCEDURE — 99283 EMERGENCY DEPT VISIT LOW MDM: CPT

## 2020-03-08 PROCEDURE — 82550 ASSAY OF CK (CPK): CPT

## 2020-03-08 PROCEDURE — 74011000250 HC RX REV CODE- 250: Performed by: PHYSICIAN ASSISTANT

## 2020-03-08 PROCEDURE — 80053 COMPREHEN METABOLIC PANEL: CPT

## 2020-03-08 PROCEDURE — 85025 COMPLETE CBC W/AUTO DIFF WBC: CPT

## 2020-03-08 PROCEDURE — 84484 ASSAY OF TROPONIN QUANT: CPT

## 2020-03-08 PROCEDURE — 94640 AIRWAY INHALATION TREATMENT: CPT

## 2020-03-08 PROCEDURE — 77030029684 HC NEB SM VOL KT MONA -A

## 2020-03-08 PROCEDURE — 93005 ELECTROCARDIOGRAM TRACING: CPT

## 2020-03-08 PROCEDURE — 96374 THER/PROPH/DIAG INJ IV PUSH: CPT

## 2020-03-08 PROCEDURE — 82962 GLUCOSE BLOOD TEST: CPT

## 2020-03-08 PROCEDURE — 36415 COLL VENOUS BLD VENIPUNCTURE: CPT

## 2020-03-08 PROCEDURE — 74011250636 HC RX REV CODE- 250/636: Performed by: EMERGENCY MEDICINE

## 2020-03-08 PROCEDURE — 71046 X-RAY EXAM CHEST 2 VIEWS: CPT

## 2020-03-08 PROCEDURE — 71250 CT THORAX DX C-: CPT

## 2020-03-08 PROCEDURE — 83690 ASSAY OF LIPASE: CPT

## 2020-03-08 RX ORDER — DEXAMETHASONE SODIUM PHOSPHATE 100 MG/10ML
10 INJECTION INTRAMUSCULAR; INTRAVENOUS
Status: DISCONTINUED | OUTPATIENT
Start: 2020-03-08 | End: 2020-03-08

## 2020-03-08 RX ORDER — PROMETHAZINE HYDROCHLORIDE AND DEXTROMETHORPHAN HYDROBROMIDE 6.25; 15 MG/5ML; MG/5ML
5 SYRUP ORAL
Qty: 118 ML | Refills: 0 | Status: SHIPPED | OUTPATIENT
Start: 2020-03-08 | End: 2020-03-15

## 2020-03-08 RX ORDER — BENZONATATE 100 MG/1
100 CAPSULE ORAL
Qty: 21 CAP | Refills: 0 | Status: SHIPPED | OUTPATIENT
Start: 2020-03-08 | End: 2020-03-15

## 2020-03-08 RX ORDER — DEXAMETHASONE SODIUM PHOSPHATE 10 MG/ML
10 INJECTION INTRAMUSCULAR; INTRAVENOUS ONCE
Status: COMPLETED | OUTPATIENT
Start: 2020-03-08 | End: 2020-03-08

## 2020-03-08 RX ORDER — PREDNISONE 20 MG/1
20 TABLET ORAL 2 TIMES DAILY
Qty: 4 TAB | Refills: 0 | Status: SHIPPED | OUTPATIENT
Start: 2020-03-08 | End: 2020-03-10

## 2020-03-08 RX ORDER — FLUTICASONE PROPIONATE 50 MCG
2 SPRAY, SUSPENSION (ML) NASAL DAILY
Qty: 1 BOTTLE | Refills: 0 | Status: SHIPPED | OUTPATIENT
Start: 2020-03-08

## 2020-03-08 RX ORDER — IPRATROPIUM BROMIDE AND ALBUTEROL SULFATE 2.5; .5 MG/3ML; MG/3ML
3 SOLUTION RESPIRATORY (INHALATION)
Status: COMPLETED | OUTPATIENT
Start: 2020-03-08 | End: 2020-03-08

## 2020-03-08 RX ADMIN — IPRATROPIUM BROMIDE AND ALBUTEROL SULFATE 3 ML: .5; 3 SOLUTION RESPIRATORY (INHALATION) at 19:17

## 2020-03-08 RX ADMIN — DEXAMETHASONE SODIUM PHOSPHATE 10 MG: 10 INJECTION, EMULSION INTRAMUSCULAR; INTRAVENOUS at 20:13

## 2020-03-09 LAB
ATRIAL RATE: 66 BPM
CALCULATED P AXIS, ECG09: 66 DEGREES
CALCULATED R AXIS, ECG10: -34 DEGREES
CALCULATED T AXIS, ECG11: 72 DEGREES
DIAGNOSIS, 93000: NORMAL
P-R INTERVAL, ECG05: 154 MS
Q-T INTERVAL, ECG07: 450 MS
QRS DURATION, ECG06: 148 MS
QTC CALCULATION (BEZET), ECG08: 471 MS
VENTRICULAR RATE, ECG03: 66 BPM

## 2020-03-09 NOTE — DISCHARGE INSTRUCTIONS

## 2020-03-09 NOTE — ED PROVIDER NOTES
EMERGENCY DEPARTMENT HISTORY AND PHYSICAL EXAM      Date: 3/8/2020  Patient Name: Kaylie aSnto    History of Presenting Illness     Chief Complaint   Patient presents with    Chest Pain     mid lower chest, started after taking 2 pills instead of 1 [Vitamin D2 (1.25mg/50,000units)]       History Provided By: Patient    HPI: Kaylie Santo, 75 y.o. female with PMHx significant for COPD with early stage emphysema, bipolar depression, diabetes, left bundle branch block, hypercholesterolemia, recent multiple losses in the family. Patient presents with centralized chest tightness. She does have a history of COPD and has not been compliant with taking her medications according to her niece she has recently had multiple losses in the family within the past 4 months and she has not been eating and has had poor oral and oral intake. Patient states that symptoms began after having 2 tablets of a vitamin D medication however has been persistent over the past several days. She does state that she is coughing up phlegm, have nasal congestion and was recently seen here on the fourth for upper respiratory symptoms and has not been compliant with her medications as well. She denies any abdominal pain, flank pain, upper or lower extremity or facial paresthesias, diaphoresis, syncopal episodes at this time. She denies any MI, stroke, aneurysm, dissection history for her or family members. Please note for examination and HPI were completed I did introduce myself as the physician assistant. Please note that this dictation was completed with [employer], the [employer] voice recognition software. Quite often unanticipated grammatical, syntax, homophones, and other interpretive errors are inadvertently transcribed by the computer software. Please disregard these errors. Please excuse any errors that have escaped final proofreading. For further clarification on any chart please contact myself. Thank you.     There are no other complaints, changes, or physical findings at this time. PCP: BLANQUITA SEGURA (Inactive)    No current facility-administered medications on file prior to encounter. Current Outpatient Medications on File Prior to Encounter   Medication Sig Dispense Refill    guaiFENesin-codeine (ROBITUSSIN AC) 100-10 mg/5 mL solution Take 5 mL by mouth three (3) times daily as needed for Cough for up to 8 days. Max Daily Amount: 15 mL. 120 mL 0    predniSONE (DELTASONE) 20 mg tablet Take 60 mg by mouth daily for 5 days. 15 Tab 0    levoFLOXacin (LEVAQUIN) 750 mg tablet Take 1 Tab by mouth daily for 7 days. 7 Tab 0    Nebulizer & Compressor machine 1 Each by Does Not Apply route every four (4) hours as needed for Wheezing. As directed 1 Each 0    albuterol (ACCUNEB) 1.25 mg/3 mL nebu Take 3 mL by inhalation every four (4) hours as needed for Wheezing (wheezing). 25 Each 0    albuterol (PROVENTIL HFA, VENTOLIN HFA, PROAIR HFA) 90 mcg/actuation inhaler Take 2 Puffs by inhalation every four (4) hours as needed for Wheezing. 1 Inhaler 0    naproxen (NAPROSYN) 500 mg tablet Take 1 Tab by mouth two (2) times daily (with meals) for 10 days. 20 Tab 0    guaiFENesin 200 mg/5 mL liqd Take 10 mL by mouth four (4) times daily as needed for Cough. 120 mL 0    metFORMIN (GLUCOPHAGE) 500 mg tablet TAKE 1 TABLET BY MOUTH TWICE A DAY WITH MEALS 180 Tab 1    ergocalciferol (ERGOCALCIFEROL) 1,250 mcg (50,000 unit) capsule Take 1 Cap by mouth every seven (7) days. 4 Cap 3    calcium-vitamin D (OYSTER SHELL) 500 mg(1,250mg) -200 unit per tablet Take 1 Tab by mouth two (2) times daily (with meals). 180 Tab 4    umeclidinium-vilanterol (ANORO ELLIPTA) 62.5-25 mcg/actuation inhaler Take 1 Puff by inhalation daily.  DC spiriva 1 Inhaler 5    OXcarbazepine (TRILEPTAL) 300 mg tablet TAKE 2 TABLETS BY MOUTH AT BEDTIME  1    amLODIPine (NORVASC) 10 mg tablet TAKE 1 TABLET BY MOUTH EVERY DAY 90 Tab 1    losartan (COZAAR) 100 mg tablet TAKE 1 TABLET BY MOUTH EVERY DAY 90 Tab 1    metoprolol tartrate (LOPRESSOR) 50 mg tablet TAKE 1 TABLET BY MOUTH TWICE A  Tab 1    simvastatin (ZOCOR) 20 mg tablet TAKE 1 TABLET BY MOUTH EVERY NIGHT 90 Tab 1    buPROPion XL (WELLBUTRIN XL) 150 mg tablet TAKE 1 TABLET BY MOUTH EVERY DAY IN THE MORNING 90 Tab 1    diclofenac (VOLTAREN) 1 % gel APPLY TO AFFECTED AREA TWO (2) TIMES DAILY AS NEEDED. 100 g 1    ammonium lactate (LAC-HYDRIN) 12 % lotion rub in to affected area well 400 mL 2    polyethylene glycol (MIRALAX) 17 gram/dose powder TAKE 17 G BY MOUTH DAILY. 1054 g 0    SENNA PLUS 8.6-50 mg per tablet TAKE 1 TABLET EVERY DAY 30 Tab 2    ONETOUCH ULTRA TEST strip CHECK BLOOD SUGAR DAILY 100 Strip 0    cholecalciferol, vitamin D3, (VITAMIN D3) 2,000 unit tab Take  by mouth.  FLUoxetine (PROZAC) 20 mg capsule TAKE 1 TABLET BY MOUTH EVERY MORNING. 90 Cap 1    famotidine (PEPCID) 20 mg tablet Take 1 Tab by mouth nightly. 30 Tab 0    Lancets misc Check blood sugar one time a day 1 Package 11    Blood-Glucose Meter monitoring kit Check sugar QD 1 Kit 0    aspirin delayed-release 81 mg tablet Take  by mouth daily.          Past History     Past Medical History:  Past Medical History:   Diagnosis Date    COPD     early stage of emphysema    Depression     bipolar disease    Diabetes ([address]     [facility] [address] [address]     for 15 years    Hypercholesterolemia     Hypertension     LBBB (left bundle branch block)     Psychiatric disorder     BIpolar    Renal cyst, right     left kidney in pelvis    Thyroid disease     thyroid nodule await for biopsy       Past Surgical History:  Past Surgical History:   Procedure Laterality Date    BREAST SURGERY PROCEDURE UNLISTED      breast cyst removed from left breast    HX BREAST BIOPSY Right     cyst removed at age 22       Family History:  Family History   Problem Relation Age of Onset    Diabetes Mother     Hypertension Mother     Elevated Lipids Mother    Jocelynn Austin Cancer Mother         breast ac    Breast Cancer Mother     Cancer Father         tongue ca    Diabetes Sister     Breast Cancer Sister     Heart Disease Brother        Social History:  Social History     Tobacco Use    Smoking status: Current Every Day Smoker     Packs/day: 1.00     Years: 30.00     Pack years: 30.00     Types: Cigarettes    Smokeless tobacco: Never Used   Substance Use Topics    Alcohol use: No     Comment: 1 beer per week.  Drug use: No       Allergies: Allergies   Allergen Reactions    Contrast Agent [Iodine] Anaphylaxis    Pcn [Penicillins] Rash    Pcn [Penicillins] Hives         Review of Systems   Review of Systems   HENT: Positive for congestion. Respiratory: Positive for cough, chest tightness and shortness of breath. All other systems reviewed and are negative. Physical Exam   Physical Exam  Vitals signs and nursing note reviewed. Constitutional:       Appearance: She is well-developed. HENT:      Head: Normocephalic and atraumatic. Nose: Congestion and rhinorrhea present. Eyes:      Conjunctiva/sclera: Conjunctivae normal.      Pupils: Pupils are equal, round, and reactive to light. Neck:      Musculoskeletal: Normal range of motion and neck supple. Thyroid: No thyromegaly. Cardiovascular:      Rate and Rhythm: Normal rate and regular rhythm. Heart sounds: Normal heart sounds. No murmur. Pulmonary:      Effort: Pulmonary effort is normal. No respiratory distress. Breath sounds: Normal breath sounds. No stridor. No wheezing. Abdominal:      General: Bowel sounds are normal.      Palpations: Abdomen is soft. Tenderness: There is no abdominal tenderness. Musculoskeletal: Normal range of motion. General: No tenderness. Lymphadenopathy:      Cervical: No cervical adenopathy. Skin:     General: Skin is warm. Neurological:      Mental Status: She is alert and oriented to person, place, and time.       Deep Tendon Reflexes: Reflexes are normal and symmetric. Psychiatric:         Judgment: Judgment normal.         Diagnostic Study Results     Labs -     Recent Results (from the past 12 hour(s))   EKG, 12 LEAD, INITIAL    Collection Time: 03/08/20  6:49 PM   Result Value Ref Range    Ventricular Rate 66 BPM    Atrial Rate 66 BPM    P-R Interval 154 ms    QRS Duration 148 ms    Q-T Interval 450 ms    QTC Calculation (Bezet) 471 ms    Calculated P Axis 66 degrees    Calculated R Axis -34 degrees    Calculated T Axis 72 degrees    Diagnosis       Normal sinus rhythm  Possible Left atrial enlargement  Left axis deviation  Left bundle branch block  Abnormal ECG  When compared with ECG of 04-MAR-2020 21:16,  No significant change was found     GLUCOSE, POC    Collection Time: 03/08/20  7:13 PM   Result Value Ref Range    Glucose (POC) 183 (H) 65 - 100 mg/dL    Performed by Nanda Rich (PCT)    CBC WITH AUTOMATED DIFF    Collection Time: 03/08/20  7:18 PM   Result Value Ref Range    WBC 11.1 (H) 3.6 - 11.0 K/uL    RBC 4.87 3.80 - 5.20 M/uL    HGB 16.8 (H) 11.5 - 16.0 g/dL    HCT 46.2 35.0 - 47.0 %    MCV 94.9 80.0 - 99.0 FL    MCH 34.5 (H) 26.0 - 34.0 PG    MCHC 36.4 30.0 - 36.5 g/dL    RDW 11.8 11.5 - 14.5 %    PLATELET 761 751 - 260 K/uL    MPV 8.2 (L) 8.9 - 12.9 FL    NRBC 0.0 0  WBC    ABSOLUTE NRBC 0.00 0.00 - 0.01 K/uL    NEUTROPHILS 84 (H) 32 - 75 %    LYMPHOCYTES 8 (L) 12 - 49 %    MONOCYTES 7 5 - 13 %    EOSINOPHILS 0 0 - 7 %    BASOPHILS 0 0 - 1 %    IMMATURE GRANULOCYTES 1 (H) 0.0 - 0.5 %    ABS. NEUTROPHILS 9.2 (H) 1.8 - 8.0 K/UL    ABS. LYMPHOCYTES 0.9 0.8 - 3.5 K/UL    ABS. MONOCYTES 0.8 0.0 - 1.0 K/UL    ABS. EOSINOPHILS 0.0 0.0 - 0.4 K/UL    ABS. BASOPHILS 0.0 0.0 - 0.1 K/UL    ABS. IMM.  GRANS. 0.1 (H) 0.00 - 0.04 K/UL    DF AUTOMATED     METABOLIC PANEL, COMPREHENSIVE    Collection Time: 03/08/20  7:18 PM   Result Value Ref Range    Sodium 126 (L) 136 - 145 mmol/L    Potassium 3.7 3.5 - 5.1 mmol/L    Chloride 88 (L) 97 - 108 mmol/L    CO2 25 21 - 32 mmol/L    Anion gap 13 5 - 15 mmol/L    Glucose 176 (H) 65 - 100 mg/dL    BUN 6 6 - 20 MG/DL    Creatinine 0.62 0.55 - 1.02 MG/DL    BUN/Creatinine ratio 10 (L) 12 - 20      GFR est AA >60 >60 ml/min/1.73m2    GFR est non-AA >60 >60 ml/min/1.73m2    Calcium 9.1 8.5 - 10.1 MG/DL    Bilirubin, total 0.2 0.2 - 1.0 MG/DL    ALT (SGPT) 14 12 - 78 U/L    AST (SGOT) 10 (L) 15 - 37 U/L    Alk. phosphatase 89 45 - 117 U/L    Protein, total 7.8 6.4 - 8.2 g/dL    Albumin 3.4 (L) 3.5 - 5.0 g/dL    Globulin 4.4 (H) 2.0 - 4.0 g/dL    A-G Ratio 0.8 (L) 1.1 - 2.2     LIPASE    Collection Time: 03/08/20  7:18 PM   Result Value Ref Range    Lipase 115 73 - 393 U/L   TROPONIN I    Collection Time: 03/08/20  7:18 PM   Result Value Ref Range    Troponin-I, Qt. <0.05 <0.05 ng/mL   CK W/ REFLX CKMB    Collection Time: 03/08/20  7:18 PM   Result Value Ref Range    CK 25 (L) 26 - 192 U/L     EKG read by attending provider Dr. uGi Ruff is comparable to last time. Radiologic Studies -   CT CHEST WO CONT   Final Result   IMPRESSION: No acute disease. Emphysema. XR CHEST PA LAT   Final Result   IMPRESSION: No acute disease. No significant interval change. CT Results  (Last 48 hours)               03/08/20 2044  CT CHEST WO CONT Final result    Impression:  IMPRESSION: No acute disease. Emphysema. Narrative:  INDICATION:  CP        EXAM: Chest CT   CT dose reduction was achieved through use of a standardized protocol tailored   for this examination and automatic exposure control for dose modulation. CONTRAST: None. COMPARISON: None. FINDINGS:   The lungs are clear. Lungs are emphysematous. There is a stent graft in the aorta, without aneurysm or periaortic fluid   collection. There is no significant adenopathy. There is no pleural or pericardial effusion. Visualized thyroid and lower neck soft tissues show no acute finding.                CXR Results (Last 48 hours)               03/08/20 1955  XR CHEST PA LAT Final result    Impression:  IMPRESSION: No acute disease. No significant interval change. Narrative:  INDICATION: Cough       EXAM: CXR 2 Views. COMPARISON: 3/4/2020. FINDINGS: Frontal and lateral views of the chest show the lungs are free of   acute disease. Heart size is normal. There is no pulmonary edema. There is no   evident pneumothorax, adenopathy or pleural effusion. Thoracic aortic stent   appears stable. Medical Decision Making   I am the first provider for this patient. I reviewed the vital signs, available nursing notes, past medical history, past surgical history, family history and social history. Vital Signs-Reviewed the patient's vital signs. Patient Vitals for the past 12 hrs:   Temp Pulse Resp BP SpO2   03/08/20 2005     96 %   03/08/20 2000     95 %   03/08/20 1917     97 %   03/08/20 1820 97.6 °F (36.4 °C) 67 15 152/68 96 %       Records Reviewed: Nursing Notes    Provider Notes (Medical Decision Making): At this time patient does have comparable EKG no elevations of troponin or CK-MB and I do not believe she is having any ACS at this time. She does have significant wheezing noted on all lung fields and did have market improvement after DuoNeb breathing treatment and I do believe that most likely her symptoms are upper respiratory in nature and also due to her emphysema. Due to patient's poor oral intake she did have hyponatremia however we will have her go to the primary care specialist for further evaluation. She also had hypochloremia and also will have that evaluated. At this time she did not show any other acute findings or she will be discharged in stable condition to follow-up on outpatient basis and niece verbalizes her understanding agreement to help patient get to these appointments.   She does have left bundle branch block noted on EKG, no acute findings on CT other than emphysema. Understands to return for any new or however. Please note: This case was staffed with attending physician Dr. Gui Ruff please see his supervisory note for further details. ED Course:   Initial assessment performed. The patients presenting problems have been discussed, and they are in agreement with the care plan formulated and outlined with them. I have encouraged them to ask questions as they arise throughout their visit. Critical Care Time: None    Disposition:  Dispo    PLAN:  1. Discharge Medication List as of 3/8/2020  9:14 PM      START taking these medications    Details   promethazine-dextromethorphan (PROMETHAZINE-DM) 6.25-15 mg/5 mL syrup Take 5 mL by mouth nightly for 7 days. , Normal, Disp-118 mL, R-0      fluticasone propionate (FLONASE) 50 mcg/actuation nasal spray 2 Sprays by Both Nostrils route daily. , Normal, Disp-1 Bottle, R-0      !! predniSONE (DELTASONE) 20 mg tablet Take 20 mg by mouth two (2) times a day for 2 days. With Breakfast, Normal, Disp-4 Tab, R-0      benzonatate (TESSALON) 100 mg capsule Take 1 Cap by mouth three (3) times daily as needed for Cough for up to 7 days. , Normal, Disp-21 Cap, R-0       !! - Potential duplicate medications found. Please discuss with provider. CONTINUE these medications which have NOT CHANGED    Details   guaiFENesin-codeine (ROBITUSSIN AC) 100-10 mg/5 mL solution Take 5 mL by mouth three (3) times daily as needed for Cough for up to 8 days. Max Daily Amount: 15 mL. , Print, Disp-120 mL, R-0      !! predniSONE (DELTASONE) 20 mg tablet Take 60 mg by mouth daily for 5 days. , Print, Disp-15 Tab, R-0      levoFLOXacin (LEVAQUIN) 750 mg tablet Take 1 Tab by mouth daily for 7 days. , Print, Disp-7 Tab, R-0      Nebulizer & Compressor machine 1 Each by Does Not Apply route every four (4) hours as needed for Wheezing.  As directed, Print, Disp-1 Each, R-0      albuterol (ACCUNEB) 1.25 mg/3 mL nebu Take 3 mL by inhalation every four (4) hours as needed for Wheezing (wheezing). , Print, Disp-25 Each, R-0      albuterol (PROVENTIL HFA, VENTOLIN HFA, PROAIR HFA) 90 mcg/actuation inhaler Take 2 Puffs by inhalation every four (4) hours as needed for Wheezing., Print, Disp-1 Inhaler, R-0      naproxen (NAPROSYN) 500 mg tablet Take 1 Tab by mouth two (2) times daily (with meals) for 10 days. , Print, Disp-20 Tab, R-0      guaiFENesin 200 mg/5 mL liqd Take 10 mL by mouth four (4) times daily as needed for Cough. , Print, Disp-120 mL, R-0      metFORMIN (GLUCOPHAGE) 500 mg tablet TAKE 1 TABLET BY MOUTH TWICE A DAY WITH MEALS, Normal, Disp-180 Tab, R-1      ergocalciferol (ERGOCALCIFEROL) 1,250 mcg (50,000 unit) capsule Take 1 Cap by mouth every seven (7) days. , Normal, Disp-4 Cap, R-3      calcium-vitamin D (OYSTER SHELL) 500 mg(1,250mg) -200 unit per tablet Take 1 Tab by mouth two (2) times daily (with meals). , Normal, Disp-180 Tab, R-4      umeclidinium-vilanterol (ANORO ELLIPTA) 62.5-25 mcg/actuation inhaler Take 1 Puff by inhalation daily.  DC spiriva, Normal, Disp-1 Inhaler, R-5      OXcarbazepine (TRILEPTAL) 300 mg tablet TAKE 2 TABLETS BY MOUTH AT BEDTIME, Historical Med, R-1      amLODIPine (NORVASC) 10 mg tablet TAKE 1 TABLET BY MOUTH EVERY DAY, Normal, Disp-90 Tab, R-1      losartan (COZAAR) 100 mg tablet TAKE 1 TABLET BY MOUTH EVERY DAY, Normal, Disp-90 Tab, R-1      metoprolol tartrate (LOPRESSOR) 50 mg tablet TAKE 1 TABLET BY MOUTH TWICE A DAY, Normal, Disp-180 Tab, R-1      simvastatin (ZOCOR) 20 mg tablet TAKE 1 TABLET BY MOUTH EVERY NIGHT, Normal, Disp-90 Tab, R-1      buPROPion XL (WELLBUTRIN XL) 150 mg tablet TAKE 1 TABLET BY MOUTH EVERY DAY IN THE MORNING, Normal, Disp-90 Tab, R-1      diclofenac (VOLTAREN) 1 % gel APPLY TO AFFECTED AREA TWO (2) TIMES DAILY AS NEEDED., Normal, Disp-100 g, R-1      ammonium lactate (LAC-HYDRIN) 12 % lotion rub in to affected area well, Normal, Disp-400 mL, R-2      polyethylene glycol (MIRALAX) 17 gram/dose powder TAKE 17 G BY MOUTH DAILY., Normal, Disp-1054 g, R-0      SENNA PLUS 8.6-50 mg per tablet TAKE 1 TABLET EVERY DAY, Normal, Disp-30 Tab, R-2      ONETOUCH ULTRA TEST strip CHECK BLOOD SUGAR DAILY, Normal, Disp-100 Strip, R-0      cholecalciferol, vitamin D3, (VITAMIN D3) 2,000 unit tab Take  by mouth., Historical Med      FLUoxetine (PROZAC) 20 mg capsule TAKE 1 TABLET BY MOUTH EVERY MORNING., Normal, Disp-90 Cap, R-1      famotidine (PEPCID) 20 mg tablet Take 1 Tab by mouth nightly., Normal, Disp-30 Tab, R-0      Lancets misc Check blood sugar one time a day, Normal, Disp-1 Package, R-11      Blood-Glucose Meter monitoring kit Check sugar QD, Normal, Disp-1 Kit, R-0      aspirin delayed-release 81 mg tablet Take  by mouth daily. , Historical Med       !! - Potential duplicate medications found. Please discuss with provider. 2.   Follow-up Information    None       Return to ED if worse     Diagnosis     Clinical Impression:   1. Pulmonary emphysema, unspecified emphysema type ([address]    2. Hyponatremia    3. LBBB (left bundle branch block)    4. History of diabetes mellitus    5. Hypochloremia          Please note that this dictation was completed with [employer], the computer voice recognition software. Quite often unanticipated grammatical, syntax, homophones, and other interpretive errors are inadvertently transcribed by the computer software. Please disregards these errors. Please excuse any errors that have escaped final proofreading. This note will not be viewable in [address]

## 2020-04-07 DIAGNOSIS — F17.200 SMOKING: ICD-10-CM

## 2020-04-07 NOTE — TELEPHONE ENCOUNTER
Requested Prescriptions     Pending Prescriptions Disp Refills    umeclidinium-vilanteroL (ANORO ELLIPTA) 62.5-25 mcg/actuation inhaler 1 Inhaler 5     Sig: Take 1 Puff by inhalation daily. [address] requesting 90 day supply with refills.   02/13/2020 06/11/2020  [facility] on file

## 2020-07-29 DIAGNOSIS — I10 ESSENTIAL HYPERTENSION: ICD-10-CM

## 2020-07-29 RX ORDER — LOSARTAN POTASSIUM 100 MG/1
TABLET ORAL
Qty: 90 TAB | Refills: 1 | Status: SHIPPED | OUTPATIENT
Start: 2020-07-29 | End: 2021-01-26

## 2020-07-30 DIAGNOSIS — E78.00 PURE HYPERCHOLESTEROLEMIA: ICD-10-CM

## 2020-07-30 RX ORDER — SIMVASTATIN 20 MG/1
TABLET, FILM COATED ORAL
Qty: 90 TAB | Refills: 1 | Status: SHIPPED | OUTPATIENT
Start: 2020-07-30 | End: 2021-01-22

## 2020-08-03 DIAGNOSIS — I10 ESSENTIAL HYPERTENSION: ICD-10-CM

## 2020-08-03 RX ORDER — AMLODIPINE BESYLATE 10 MG/1
TABLET ORAL
Qty: 90 TAB | Refills: 1 | Status: SHIPPED | OUTPATIENT
Start: 2020-08-03 | End: 2021-01-25

## 2020-09-23 DIAGNOSIS — E11.9 TYPE 2 DIABETES MELLITUS WITHOUT COMPLICATION, WITHOUT LONG-TERM CURRENT USE OF INSULIN (HCC): ICD-10-CM

## 2020-09-24 RX ORDER — METFORMIN HYDROCHLORIDE 500 MG/1
TABLET ORAL
Qty: 180 TAB | Refills: 0 | Status: SHIPPED | OUTPATIENT
Start: 2020-09-24 | End: 2021-04-28

## 2020-10-26 ENCOUNTER — HOSPITAL ENCOUNTER (EMERGENCY)
Age: 65
Discharge: HOME | End: 2020-10-26
Attending: EMERGENCY MEDICINE
Payer: MEDICARE

## 2020-10-26 VITALS
DIASTOLIC BLOOD PRESSURE: 80 MMHG | SYSTOLIC BLOOD PRESSURE: 132 MMHG | HEART RATE: 69 BPM | OXYGEN SATURATION: 96 % | BODY MASS INDEX: 27.31 KG/M2 | HEIGHT: 64 IN | TEMPERATURE: 97.4 F | RESPIRATION RATE: 17 BRPM | WEIGHT: 160 LBS

## 2020-10-26 DIAGNOSIS — Z72.0 TOBACCO ABUSE: ICD-10-CM

## 2020-10-26 DIAGNOSIS — E87.1 CHRONIC HYPONATREMIA: ICD-10-CM

## 2020-10-26 DIAGNOSIS — F10.921 ALCOHOL INTOXICATION WITH DELIRIUM (HCC): Primary | ICD-10-CM

## 2020-10-26 DIAGNOSIS — E87.1 DILUTIONAL HYPONATREMIA: ICD-10-CM

## 2020-10-26 LAB
ALBUMIN SERPL-MCNC: 3.4 G/DL (ref 3.5–5)
ALBUMIN/GLOB SERPL: 0.9 {RATIO} (ref 1.1–2.2)
ALP SERPL-CCNC: 82 U/L (ref 45–117)
ALT SERPL-CCNC: 14 U/L (ref 12–78)
ANION GAP BLD CALC-SCNC: 19 MMOL/L (ref 10–20)
ANION GAP SERPL CALC-SCNC: 9 MMOL/L (ref 5–15)
AST SERPL-CCNC: 14 U/L (ref 15–37)
BASOPHILS # BLD: 0 K/UL (ref 0–0.1)
BASOPHILS NFR BLD: 0 % (ref 0–1)
BILIRUB SERPL-MCNC: 0.4 MG/DL (ref 0.2–1)
BUN BLD-MCNC: <4 MG/DL (ref 9–20)
BUN SERPL-MCNC: 6 MG/DL (ref 6–20)
BUN/CREAT SERPL: 9 (ref 12–20)
CA-I BLD-MCNC: 1.03 MMOL/L (ref 1.12–1.32)
CALCIUM SERPL-MCNC: 8.6 MG/DL (ref 8.5–10.1)
CHLORIDE BLD-SCNC: 96 MMOL/L (ref 98–107)
CHLORIDE SERPL-SCNC: 93 MMOL/L (ref 97–108)
CO2 BLD-SCNC: 18 MMOL/L (ref 21–32)
CO2 SERPL-SCNC: 19 MMOL/L (ref 21–32)
CREAT BLD-MCNC: 0.7 MG/DL (ref 0.6–1.3)
CREAT SERPL-MCNC: 0.66 MG/DL (ref 0.55–1.02)
DIFFERENTIAL METHOD BLD: ABNORMAL
EOSINOPHIL # BLD: 0.1 K/UL (ref 0–0.4)
EOSINOPHIL NFR BLD: 0 % (ref 0–7)
ERYTHROCYTE [DISTWIDTH] IN BLOOD BY AUTOMATED COUNT: 13.1 % (ref 11.5–14.5)
ETHANOL SERPL-MCNC: 275 MG/DL
GLOBULIN SER CALC-MCNC: 3.9 G/DL (ref 2–4)
GLUCOSE BLD STRIP.AUTO-MCNC: 141 MG/DL (ref 65–100)
GLUCOSE BLD-MCNC: 122 MG/DL (ref 65–100)
GLUCOSE SERPL-MCNC: 134 MG/DL (ref 65–100)
HCT VFR BLD AUTO: 43.1 % (ref 35–47)
HCT VFR BLD CALC: 50 % (ref 35–47)
HGB BLD-MCNC: 15.6 G/DL (ref 11.5–16)
IMM GRANULOCYTES # BLD AUTO: 0.1 K/UL (ref 0–0.04)
IMM GRANULOCYTES NFR BLD AUTO: 1 % (ref 0–0.5)
LYMPHOCYTES # BLD: 1.9 K/UL (ref 0.8–3.5)
LYMPHOCYTES NFR BLD: 17 % (ref 12–49)
MCH RBC QN AUTO: 35.1 PG (ref 26–34)
MCHC RBC AUTO-ENTMCNC: 36.2 G/DL (ref 30–36.5)
MCV RBC AUTO: 96.9 FL (ref 80–99)
MONOCYTES # BLD: 1.2 K/UL (ref 0–1)
MONOCYTES NFR BLD: 11 % (ref 5–13)
NEUTS SEG # BLD: 8 K/UL (ref 1.8–8)
NEUTS SEG NFR BLD: 71 % (ref 32–75)
NRBC # BLD: 0 K/UL (ref 0–0.01)
NRBC BLD-RTO: 0 PER 100 WBC
PLATELET # BLD AUTO: 270 K/UL (ref 150–400)
PMV BLD AUTO: 8.5 FL (ref 8.9–12.9)
POTASSIUM BLD-SCNC: 3.9 MMOL/L (ref 3.5–5.1)
POTASSIUM SERPL-SCNC: 3.5 MMOL/L (ref 3.5–5.1)
PROT SERPL-MCNC: 7.3 G/DL (ref 6.4–8.2)
RBC # BLD AUTO: 4.45 M/UL (ref 3.8–5.2)
SERVICE CMNT-IMP: ABNORMAL
SERVICE CMNT-IMP: ABNORMAL
SODIUM BLD-SCNC: 128 MMOL/L (ref 136–145)
SODIUM SERPL-SCNC: 121 MMOL/L (ref 136–145)
WBC # BLD AUTO: 11.2 K/UL (ref 3.6–11)

## 2020-10-26 PROCEDURE — 82962 GLUCOSE BLOOD TEST: CPT

## 2020-10-26 PROCEDURE — 99285 EMERGENCY DEPT VISIT HI MDM: CPT

## 2020-10-26 PROCEDURE — 85025 COMPLETE CBC W/AUTO DIFF WBC: CPT

## 2020-10-26 PROCEDURE — 74011250636 HC RX REV CODE- 250/636: Performed by: EMERGENCY MEDICINE

## 2020-10-26 PROCEDURE — 80053 COMPREHEN METABOLIC PANEL: CPT

## 2020-10-26 PROCEDURE — 96360 HYDRATION IV INFUSION INIT: CPT

## 2020-10-26 PROCEDURE — 80047 BASIC METABLC PNL IONIZED CA: CPT

## 2020-10-26 PROCEDURE — 96361 HYDRATE IV INFUSION ADD-ON: CPT

## 2020-10-26 PROCEDURE — 36415 COLL VENOUS BLD VENIPUNCTURE: CPT

## 2020-10-26 PROCEDURE — 93005 ELECTROCARDIOGRAM TRACING: CPT

## 2020-10-26 PROCEDURE — 80307 DRUG TEST PRSMV CHEM ANLYZR: CPT

## 2020-10-26 RX ORDER — SODIUM CHLORIDE 9 MG/ML
1000 INJECTION, SOLUTION INTRAVENOUS ONCE
Status: COMPLETED | OUTPATIENT
Start: 2020-10-26 | End: 2020-10-26

## 2020-10-26 RX ADMIN — SODIUM CHLORIDE 1000 ML: 900 INJECTION, SOLUTION INTRAVENOUS at 15:31

## 2020-10-26 RX ADMIN — SODIUM CHLORIDE 1000 ML: 900 INJECTION, SOLUTION INTRAVENOUS at 17:11

## 2020-10-26 NOTE — ED NOTES
Patient's niece called per her request, she had been looking at all hospital looking for her and was glad to get the communication. She stated that she would come to get her.  Was happy that she was okay

## 2020-10-26 NOTE — ED NOTES
Per EMS, friend of holden at the scene states that this is a daily normal for this patient, that she is a daily drinkers and just had more than usual today

## 2020-10-26 NOTE — ED NOTES
Patient trying to get out of bed to get dressed to go home, assisted back in the bed and ask to not get up without help. She stated we needed to call her niece to come and get her.      [facility] [phone]

## 2020-10-26 NOTE — DISCHARGE INSTRUCTIONS
Your examination today was consistent with severe alcohol intoxication. You also appear to be drinking too much water, either in the form of water/juice/soda or in the form of beer. This is having the effective dangerously diluting your sodium levels. You should reduce the amount that you are drinking and consider adding some salt to your food.

## 2020-10-26 NOTE — ED PROVIDER NOTES
EMERGENCY DEPARTMENT HISTORY AND PHYSICAL EXAM      Date: 10/26/2020  Patient Name: Sumi Alonso  Patient Age and Sex: 77 y.o. female    History of Presenting Illness     Chief Complaint   Patient presents with    Alcohol Problem     Slumped over in a car with smell of ETOH, patient unable to answer questions       History Provided By: EMS    Ability to gather history was limited by: Very intoxicated, difficult to arouse    HPI: Sumi Alonso, 77 y.o. female with history of diabetes, COPD, alcoholism, brought to the emergency department by EMS found slumped over, asleep in a parked car, smelling strongly of alcohol. Reportedly a friend who was on scene states that she drinks daily, was at a bar earlier today and drank too much alcohol. No reported falls or injuries. Patient herself is very drowsy and difficult to arouse, cannot provide any history. Location:    Quality:      Severity:    Duration:   Timing:      Context:    Modifying factors:   Associated symptoms:       The patient's medical, surgical, family, and social history on file were reviewed by me today.       Past Medical History:   Diagnosis Date    COPD     early stage of emphysema    Depression     bipolar disease    Diabetes ([address]     Diabetes ([address]     for 15 years    Hypercholesterolemia     Hypertension     LBBB (left bundle branch block)     Psychiatric disorder     BIpolar    Renal cyst, right     left kidney in pelvis    Thyroid disease     thyroid nodule await for biopsy     Past Surgical History:   Procedure Laterality Date    BREAST SURGERY PROCEDURE UNLISTED      breast cyst removed from left breast    HX BREAST BIOPSY Right     cyst removed at age 22       PCP: BLANQUITA SEGURA (Inactive)    Past History     Past Medical History:  Past Medical History:   Diagnosis Date    COPD     early stage of emphysema    Depression     bipolar disease    Diabetes ([address]     Diabetes ([address]     for 15 years    Hypercholesterolemia  Hypertension     LBBB (left bundle branch block)     Psychiatric disorder     BIpolar    Renal cyst, right     left kidney in pelvis    Thyroid disease     thyroid nodule await for biopsy       Past Surgical History:  Past Surgical History:   Procedure Laterality Date    BREAST SURGERY PROCEDURE UNLISTED      breast cyst removed from left breast    HX BREAST BIOPSY Right     cyst removed at age 22       Family History:  Family History   Problem Relation Age of Onset    Diabetes Mother     Hypertension Mother    [place] Elevated Lipids Mother     Cancer Mother         breast ac    Breast Cancer Mother     Cancer Father         tongue ca    Diabetes Sister     Breast Cancer Sister     Heart Disease Brother        Social History:  Social History     Tobacco Use    Smoking status: Current Every Day Smoker     Packs/day: 1.00     Years: 30.00     Pack years: 30.00     Types: Cigarettes    Smokeless tobacco: Never Used   Substance Use Topics    Alcohol use: Yes     Comment: 1 beer per week.  Drug use: No       Allergies: Allergies   Allergen Reactions    Contrast Agent [Iodine] Anaphylaxis    Pcn [Penicillins] Rash    Pcn [Penicillins] Hives       Current Medications:  No current facility-administered medications on file prior to encounter. Current Outpatient Medications on File Prior to Encounter   Medication Sig Dispense Refill    metFORMIN (GLUCOPHAGE) 500 mg tablet TAKE 1 TABLET BY MOUTH TWICE A DAY WITH MEALS 180 Tab 0    metoprolol tartrate (LOPRESSOR) 50 mg tablet TAKE 1 TABLET BY MOUTH TWICE A  Tab 0    amLODIPine (NORVASC) 10 mg tablet TAKE 1 TABLET BY MOUTH EVERY DAY 90 Tab 1    simvastatin (ZOCOR) 20 mg tablet TAKE 1 TABLET BY MOUTH EVERY DAY AT NIGHT 90 Tab 1    losartan (COZAAR) 100 mg tablet TAKE 1 TABLET BY MOUTH EVERY DAY 90 Tab 1    umeclidinium-vilanteroL (ANORO ELLIPTA) 62.5-25 mcg/actuation inhaler Take 1 Puff by inhalation daily.  DC spiriva 1 Inhaler 5    fluticasone propionate (FLONASE) 50 mcg/actuation nasal spray 2 Sprays by Both Nostrils route daily. 1 Bottle 0    Nebulizer & Compressor machine 1 Each by Does Not Apply route every four (4) hours as needed for Wheezing. As directed 1 Each 0    albuterol (ACCUNEB) 1.25 mg/3 mL nebu Take 3 mL by inhalation every four (4) hours as needed for Wheezing (wheezing). 25 Each 0    albuterol (PROVENTIL HFA, VENTOLIN HFA, PROAIR HFA) 90 mcg/actuation inhaler Take 2 Puffs by inhalation every four (4) hours as needed for Wheezing. 1 Inhaler 0    guaiFENesin 200 mg/5 mL liqd Take 10 mL by mouth four (4) times daily as needed for Cough. 120 mL 0    ergocalciferol (ERGOCALCIFEROL) 1,250 mcg (50,000 unit) capsule Take 1 Cap by mouth every seven (7) days. 4 Cap 3    calcium-vitamin D (OYSTER SHELL) 500 mg(1,250mg) -200 unit per tablet Take 1 Tab by mouth two (2) times daily (with meals). 180 Tab 4    OXcarbazepine (TRILEPTAL) 300 mg tablet TAKE 2 TABLETS BY MOUTH AT BEDTIME  1    buPROPion XL (WELLBUTRIN XL) 150 mg tablet TAKE 1 TABLET BY MOUTH EVERY DAY IN THE MORNING 90 Tab 1    diclofenac (VOLTAREN) 1 % gel APPLY TO AFFECTED AREA TWO (2) TIMES DAILY AS NEEDED. 100 g 1    ammonium lactate (LAC-HYDRIN) 12 % lotion rub in to affected area well 400 mL 2    polyethylene glycol (MIRALAX) 17 gram/dose powder TAKE 17 G BY MOUTH DAILY. 1054 g 0    SENNA PLUS 8.6-50 mg per tablet TAKE 1 TABLET EVERY DAY 30 Tab 2    ONETOUCH ULTRA TEST strip CHECK BLOOD SUGAR DAILY 100 Strip 0    cholecalciferol, vitamin D3, (VITAMIN D3) 2,000 unit tab Take  by mouth.  FLUoxetine (PROZAC) 20 mg capsule TAKE 1 TABLET BY MOUTH EVERY MORNING. 90 Cap 1    famotidine (PEPCID) 20 mg tablet Take 1 Tab by mouth nightly. 30 Tab 0    Lancets misc Check blood sugar one time a day 1 Package 11    Blood-Glucose Meter monitoring kit Check sugar QD 1 Kit 0    aspirin delayed-release 81 mg tablet Take  by mouth daily.          Review of Systems Review of Systems   Psychiatric/Behavioral: Positive for behavioral problems. All other systems reviewed and are negative. Physical Exam   Vital Signs  Patient Vitals for the past 8 hrs:   Temp Pulse Resp BP SpO2   10/26/20 1800  69 17 132/80    10/26/20 1610  63 19  96 %   10/26/20 1600  63 23 (!) 103/51 95 %   10/26/20 1530  65 18 120/71 96 %   10/26/20 1516 97.4 °F (36.3 °C) 64 18 130/61 95 %          Physical Exam  Vitals signs and nursing note reviewed. Constitutional:       General: She is not in acute distress. Appearance: Normal appearance. She is well-developed. She is not ill-appearing. Comments: Lethargic, clinically very intoxicated with alcohol. Snoring. HENT:      Head: Normocephalic and atraumatic. Mouth/Throat:      Mouth: Mucous membranes are moist.   Eyes:      General:         Right eye: No discharge. Left eye: No discharge. Conjunctiva/sclera: Conjunctivae normal.   Neck:      Musculoskeletal: Normal range of motion and neck supple. Cardiovascular:      Rate and Rhythm: Normal rate and regular rhythm. Heart sounds: Normal heart sounds. No murmur. Pulmonary:      Effort: Pulmonary effort is normal. No respiratory distress. Breath sounds: Normal breath sounds. No wheezing. Abdominal:      General: There is no distension. Palpations: Abdomen is soft. Tenderness: There is no abdominal tenderness. Musculoskeletal: Normal range of motion. General: No deformity. Skin:     General: Skin is warm and dry. Findings: No rash. Neurological:      Mental Status: She is lethargic and disoriented. GCS: GCS eye subscore is 4. GCS verbal subscore is 2. GCS motor subscore is 4. Comments: GCS 10   Psychiatric:         Cognition and Memory: Cognition is impaired. Comments: Clinically very intoxicated with alcohol.   Sleeping/snoring, unable to comply with exam.         Diagnostic Study Results   Labs  Recent Results (from the past 24 hour(s))   EKG, 12 LEAD, INITIAL    Collection Time: 10/26/20  3:18 PM   Result Value Ref Range    Ventricular Rate 61 BPM    Atrial Rate 61 BPM    P-R Interval 172 ms    QRS Duration 154 ms    Q-T Interval 486 ms    QTC Calculation (Bezet) 489 ms    Calculated P Axis 62 degrees    Calculated R Axis -19 degrees    Calculated T Axis 99 degrees    Diagnosis       Normal sinus rhythm  Left bundle branch block  When compared with ECG of 08-MAR-2020 18:49,  No significant change was found     GLUCOSE, POC    Collection Time: 10/26/20  3:25 PM   Result Value Ref Range    Glucose (POC) 141 (H) 65 - 100 mg/dL    Performed by [address] DIFF    Collection Time: 10/26/20  3:27 PM   Result Value Ref Range    WBC 11.2 (H) 3.6 - 11.0 K/uL    RBC 4.45 3.80 - 5.20 M/uL    HGB 15.6 11.5 - 16.0 g/dL    HCT 43.1 35.0 - 47.0 %    MCV 96.9 80.0 - 99.0 FL    MCH 35.1 (H) 26.0 - 34.0 PG    MCHC 36.2 30.0 - 36.5 g/dL    RDW 13.1 11.5 - 14.5 %    PLATELET 534 458 - 989 K/uL    MPV 8.5 (L) 8.9 - 12.9 FL    NRBC 0.0 0  WBC    ABSOLUTE NRBC 0.00 0.00 - 0.01 K/uL    NEUTROPHILS 71 32 - 75 %    LYMPHOCYTES 17 12 - 49 %    MONOCYTES 11 5 - 13 %    EOSINOPHILS 0 0 - 7 %    BASOPHILS 0 0 - 1 %    IMMATURE GRANULOCYTES 1 (H) 0.0 - 0.5 %    ABS. NEUTROPHILS 8.0 1.8 - 8.0 K/UL    ABS. LYMPHOCYTES 1.9 0.8 - 3.5 K/UL    ABS. MONOCYTES 1.2 (H) 0.0 - 1.0 K/UL    ABS. EOSINOPHILS 0.1 0.0 - 0.4 K/UL    ABS. BASOPHILS 0.0 0.0 - 0.1 K/UL    ABS. IMM.  GRANS. 0.1 (H) 0.00 - 0.04 K/UL    DF AUTOMATED     ETHYL ALCOHOL    Collection Time: 10/26/20  3:27 PM   Result Value Ref Range    ALCOHOL(ETHYL),SERUM 275 (H) <04 MG/DL   METABOLIC PANEL, COMPREHENSIVE    Collection Time: 10/26/20  3:27 PM   Result Value Ref Range    Sodium 121 (L) 136 - 145 mmol/L    Potassium 3.5 3.5 - 5.1 mmol/L    Chloride 93 (L) 97 - 108 mmol/L    CO2 19 (L) 21 - 32 mmol/L    Anion gap 9 5 - 15 mmol/L    Glucose 134 (H) 65 - 100 mg/dL BUN 6 6 - 20 MG/DL    Creatinine 0.66 0.55 - 1.02 MG/DL    BUN/Creatinine ratio 9 (L) 12 - 20      GFR est AA >60 >60 ml/min/1.73m2    GFR est non-AA >60 >60 ml/min/1.73m2    Calcium 8.6 8.5 - 10.1 MG/DL    Bilirubin, total 0.4 0.2 - 1.0 MG/DL    ALT (SGPT) 14 12 - 78 U/L    AST (SGOT) 14 (L) 15 - 37 U/L    Alk. phosphatase 82 45 - 117 U/L    Protein, total 7.3 6.4 - 8.2 g/dL    Albumin 3.4 (L) 3.5 - 5.0 g/dL    Globulin 3.9 2.0 - 4.0 g/dL    A-G Ratio 0.9 (L) 1.1 - 2.2     POC CHEM8    Collection Time: 10/26/20  6:15 PM   Result Value Ref Range    Calcium, ionized (POC) 1.03 (L) 1.12 - 1.32 mmol/L    Sodium (POC) 128 (L) 136 - 145 mmol/L    Potassium (POC) 3.9 3.5 - 5.1 mmol/L    Chloride (POC) 96 (L) 98 - 107 mmol/L    CO2 (POC) 18 (L) 21 - 32 mmol/L    Anion gap (POC) 19 10 - 20 mmol/L    Glucose (POC) 122 (H) 65 - 100 mg/dL    BUN (POC) <4 (L) 9 - 20 mg/dL    Creatinine (POC) 0.7 0.6 - 1.3 mg/dL    GFRAA, POC >60 >60 ml/min/1.73m2    GFRNA, POC >60 >60 ml/min/1.73m2    Hematocrit (POC) 50 (H) 35.0 - 47.0 %    Comment Comment Not Indicated. Radiologic Studies  No orders to display     CT Results  (Last 48 hours)    None        CXR Results  (Last 48 hours)    None          Procedures   Procedures    Medical Decision Making     I reviewed the patient's most recent Emergency Dept notes and diagnostic tests  in formulating my MDM on today's visit. Provider Notes (Medical Decision Making):   68-year-old female with diabetes, tobacco abuse, alcoholism, found slumped over sleeping in her car, very intoxicated. On exam she simply seems very intoxicated with alcohol. No focal deficits detected, although examination is limited. GCS 10. No signs of trauma. Her friend who was on scene reportedly told EMS that she was drinking earlier today. We will check laboratories, carefully clinically reevaluate. At this time can safely defer head CT.   No significant clinical concern for intracranial hemorrhage or injury or CVA. Danyell Boo MD  3:46 PM    Patient was noted to have sodium level of 121. She has chronic hyponatremia, typically in the 126-128 range. Patient was administered normal saline, produced at least a liter of urine, repeat sodium level was 128 on point-of-care testing. She is now alert, requesting to be discharged home. Stable for discharge. We discussed alcohol rehabilitation and tobacco cessation. I also recommended that she decrease her free water and/or beer intake, and increase her dietary salt intake. Anastacia Florence MD      TOBACCO COUNSELING:  Upon evaluation, pt expressed that they are a current tobacco user. For approximately 4 minutes, I counseled the patient on the hazards of smoking. The patient was encouraged to quit as soon as possible in order to decrease further risks to their health. We discussed nicotine replacement therapies and medical treatment options to decrease cravings and improve chances of success quitting. Pt has conveyed their understanding of the risks involved should they continue to use tobacco products. Danyell Boo MD      Consults:    Social History     Tobacco Use    Smoking status: Current Every Day Smoker     Packs/day: 1.00     Years: 30.00     Pack years: 30.00     Types: Cigarettes    Smokeless tobacco: Never Used   Substance Use Topics    Alcohol use: Yes     Comment: 1 beer per week.     Drug use: No     Patient Vitals for the past 4 hrs:   Temp Pulse Resp BP SpO2   10/26/20 1800  69 17 132/80    10/26/20 1610  63 19  96 %   10/26/20 1600  63 23 (!) 103/51 95 %   10/26/20 1530  65 18 120/71 96 %   10/26/20 1516 97.4 °F (36.3 °C) 64 18 130/61 95 %          Prescriptions from today's ED visit:  Current Discharge Medication List           Medications Administered during ED course:  Medications   sodium chloride 0.9 % bolus infusion 1,000 mL (has no administration in time range)   sodium chloride 0.9 % bolus infusion 1,000 mL (0 mL IntraVENous IV Completed 10/26/20 1706)   0.9% sodium chloride infusion 1,000 mL (0 mL IntraVENous IV Completed 10/26/20 9103)          Diagnosis and Disposition     Disposition:  Discharged    Clinical Impression:   1. Alcohol intoxication with delirium ([address]    2. Chronic hyponatremia    3. Dilutional hyponatremia    4. Tobacco abuse    5. Alcoholism /alcohol abuse [facility])        Attestation:  I personally performed the services described in this documentation on this date 10/26/2020 for patient Blain Skiff. Ziyad Dorsey MD        I was the first provider for this patient on this visit. To the best of my ability I reviewed relevant prior medical records, electrocardiograms, laboratories, and radiologic studies. The patient's presenting problems were discussed, and the patient was in agreement with the care plan formulated and outlined with them. Ziyad Dorsey MD    Please note that this dictation was completed with Dragon voice recognition software. Quite often unanticipated grammatical, syntax, homophones, and other interpretive errors are inadvertently transcribed by the computer software. Please disregard these errors and excuse any errors that have escaped final proofreading.

## 2020-10-26 NOTE — ED NOTES
Patient awake and talking, states that she did have alcohol today maybe a little more than normal. States that her niece would pick her up. Alert and Oriented to self and place. Unaware that she was found in a car and arrived EMS.

## 2020-10-26 NOTE — ED NOTES
Up to the bedside to void, large void without problems ands back to bed. Patient states that she did not eat today and she only had 2 drinks while she was out.

## 2020-10-27 LAB
ATRIAL RATE: 61 BPM
CALCULATED P AXIS, ECG09: 62 DEGREES
CALCULATED R AXIS, ECG10: -19 DEGREES
CALCULATED T AXIS, ECG11: 99 DEGREES
DIAGNOSIS, 93000: NORMAL
P-R INTERVAL, ECG05: 172 MS
Q-T INTERVAL, ECG07: 486 MS
QRS DURATION, ECG06: 154 MS
QTC CALCULATION (BEZET), ECG08: 489 MS
VENTRICULAR RATE, ECG03: 61 BPM

## 2020-11-20 DIAGNOSIS — I10 ESSENTIAL HYPERTENSION: ICD-10-CM

## 2020-11-20 RX ORDER — METOPROLOL TARTRATE 50 MG/1
TABLET ORAL
Qty: 14 TAB | Refills: 0 | Status: CANCELLED | OUTPATIENT
Start: 2020-11-20

## 2020-11-24 ENCOUNTER — VIRTUAL VISIT (OUTPATIENT)
Dept: INTERNAL MEDICINE CLINIC | Age: 65
End: 2020-11-24
Payer: MEDICARE

## 2020-11-24 DIAGNOSIS — I10 ESSENTIAL HYPERTENSION: ICD-10-CM

## 2020-11-24 DIAGNOSIS — Z12.31 BREAST CANCER SCREENING BY MAMMOGRAM: ICD-10-CM

## 2020-11-24 DIAGNOSIS — E11.21 TYPE 2 DIABETES WITH NEPHROPATHY (HCC): ICD-10-CM

## 2020-11-24 DIAGNOSIS — E11.9 TYPE 2 DIABETES MELLITUS WITHOUT COMPLICATION, WITHOUT LONG-TERM CURRENT USE OF INSULIN (HCC): ICD-10-CM

## 2020-11-24 DIAGNOSIS — Z00.00 MEDICARE ANNUAL WELLNESS VISIT, SUBSEQUENT: Primary | ICD-10-CM

## 2020-11-24 DIAGNOSIS — E66.01 MORBID OBESITY (HCC): ICD-10-CM

## 2020-11-24 PROCEDURE — 99443 PR PHYS/QHP TELEPHONE EVALUATION 21-30 MIN: CPT | Performed by: INTERNAL MEDICINE

## 2020-11-24 RX ORDER — LANCETS
EACH MISCELLANEOUS
Qty: 1 PACKAGE | Refills: 11 | Status: SHIPPED | OUTPATIENT
Start: 2020-11-24 | End: 2022-10-20 | Stop reason: SDUPTHER

## 2020-11-24 RX ORDER — METOPROLOL TARTRATE 50 MG/1
TABLET ORAL
Qty: 90 TAB | Refills: 0 | Status: SHIPPED | OUTPATIENT
Start: 2020-11-24 | End: 2021-02-18

## 2020-11-24 NOTE — PROGRESS NOTES
Kezia Schulz is a 77 y.o. female, evaluated via audio-only technology on 11/24/2020 for Medication Refill  . Assessment & Plan:   Diagnoses and all orders for this visit:    1. Essential hypertension  -     metoprolol tartrate (LOPRESSOR) 50 mg tablet; Take one tablet daily    2. Type 2 diabetes mellitus without complication, without long-term current use of insulin (HCC)  -     glucose blood VI test strips (ASCENSIA AUTODISC VI, ONE TOUCH ULTRA TEST VI) strip; Check blood sugar once daily  -     lancets misc; Check blood sugar one time a day    3. Morbid obesity ([facility]    4. Medicare annual wellness visit, subsequent    5. Breast cancer screening by mammogram  -     [facility] MAMMO BI SCREENING INCL CAD; Future    6. Diabetes mellitus type 2, uncontrolled ([facility]  -     glucose blood VI test strips (ASCENSIA AUTODISC VI, ONE TOUCH ULTRA TEST VI) strip; Check blood sugar once daily  -     lancets misc; Check blood sugar one time a day    7. Type 2 diabetes with nephropathy (HCC)  -     glucose blood VI test strips (ASCENSIA AUTODISC VI, ONE TOUCH ULTRA TEST VI) strip; Check blood sugar once daily  -     lancets misc; Check blood sugar one time a day      Patient to record blood pressures. Patient to  test strips and lancets that were called in so she can check blood glucose levels    Pt will be called regarding scheduling of a mammogram    Pt encouraged to drink more water, less soda    Pt to  metoprolol script that was refilled and take as prescribed    Subjective:     Patient is a 59year old female today who is seen today requesting a medication refill. PMH includes HTN, LBBB, DM 2, COPD, HLD, MO, and bipolar d/o. Patient would like a refill of metoprolol 50mg. Patient states she is feeling \"pretty well overall\". She reports she has not been checking her BG levels as she does not have lancets/strips. She continues to drink sodas daily and not enough water.     Pt has not been checking her BP measurements. Reports unknown weight loss amount but notes she has gone from a size 20 to a size 14. Pt reports she has just been eating less. Denies flu shot. Last mammogram over a year ago    No reports of SOB, fevers, chest pain. Prior to Admission medications    Medication Sig Start Date End Date Taking? Authorizing Provider   metoprolol tartrate (LOPRESSOR) 50 mg tablet Take one tablet daily 11/24/20  Yes Grace Verduzco NP   glucose blood VI test strips (ASCENSIA AUTODISC VI, ONE TOUCH ULTRA TEST VI) strip Check blood sugar once daily 11/24/20  Yes Omar Verduzco NP   lancets misc Check blood sugar one time a day 11/24/20  Yes Grace Verduzco NP   metFORMIN (GLUCOPHAGE) 500 mg tablet TAKE 1 TABLET BY MOUTH TWICE A DAY WITH MEALS 9/24/20  Yes Grace Verduzco NP   amLODIPine (NORVASC) 10 mg tablet TAKE 1 TABLET BY MOUTH EVERY DAY 8/3/20  Yes Grace Verduzco NP   simvastatin (ZOCOR) 20 mg tablet TAKE 1 TABLET BY MOUTH EVERY DAY AT NIGHT 7/30/20  Yes Renetta Robin MD   losartan (COZAAR) 100 mg tablet TAKE 1 TABLET BY MOUTH EVERY DAY 7/29/20  Yes Dontrell Wills NP   umeclidinium-vilanteroL (ANORO ELLIPTA) 62.5-25 mcg/actuation inhaler Take 1 Puff by inhalation daily. DC spiriva 4/7/20  Yes Renetta Robin MD   fluticasone propionate (FLONASE) 50 mcg/actuation nasal spray 2 Sprays by Both Nostrils route daily. 3/8/20  Yes Rosa Mike PA-C   Nebulizer & Compressor machine 1 Each by Does Not Apply route every four (4) hours as needed for Wheezing. As directed 3/4/20  Yes Vlad Caban MD   albuterol (ACCUNEB) 1.25 mg/3 mL nebu Take 3 mL by inhalation every four (4) hours as needed for Wheezing (wheezing). 3/4/20  Yes Vlad Caban MD   albuterol (PROVENTIL HFA, VENTOLIN HFA, PROAIR HFA) 90 mcg/actuation inhaler Take 2 Puffs by inhalation every four (4) hours as needed for Wheezing.  3/4/20  Yes Vlad Caban MD   guaiFENesin 200 mg/5 mL liqd Take 10 mL by mouth four (4) times daily as needed for Cough. 3/4/20  Yes Fran Woodruff MD   ergocalciferol (ERGOCALCIFEROL) 1,250 mcg (50,000 unit) capsule Take 1 Cap by mouth every seven (7) days. 2/19/20  Yes Lisha Millan MD   calcium-vitamin D (OYSTER SHELL) 500 mg(1,250mg) -200 unit per tablet Take 1 Tab by mouth two (2) times daily (with meals). 2/13/20  Yes Lisha Millan MD   OXcarbazepine (TRILEPTAL) 300 mg tablet TAKE 2 TABLETS BY MOUTH AT BEDTIME 11/18/19  Yes Provider, Historical   buPROPion XL (WELLBUTRIN XL) 150 mg tablet TAKE 1 TABLET BY MOUTH EVERY DAY IN THE MORNING 10/31/19  Yes Lisha Millan MD   diclofenac (VOLTAREN) 1 % gel APPLY TO AFFECTED AREA TWO (2) TIMES DAILY AS NEEDED. 8/13/18  Yes Lisha Millan MD   polyethylene glycol (MIRALAX) 17 gram/dose powder TAKE 17 G BY MOUTH DAILY. 2/14/18  Yes Lisha Millan MD   SENNA PLUS 8.6-50 mg per tablet TAKE 1 TABLET EVERY DAY 2/14/18  Yes Lisha Millan MD   [facility] ULTRA TEST strip CHECK BLOOD SUGAR DAILY 10/6/17  Yes Casandra Ivy NP   cholecalciferol, vitamin D3, (VITAMIN D3) 2,000 unit tab Take  by mouth. Yes Provider, Historical   FLUoxetine (PROZAC) 20 mg capsule TAKE 1 TABLET BY MOUTH EVERY MORNING. 10/24/16  Yes Lisha Millan MD   famotidine (PEPCID) 20 mg tablet Take 1 Tab by mouth nightly. 4/11/16  Yes Lisha Millan MD   Blood-Glucose Meter monitoring kit Check sugar QD 12/9/13  Yes Lisha Millan MD   aspirin delayed-release 81 mg tablet Take  by mouth daily. Yes Provider, Historical   ammonium lactate (LAC-HYDRIN) 12 % lotion rub in to affected area well 3/5/18   Lisha Millan MD         Review of Systems   Constitutional: Positive for weight loss. Negative for fever and malaise/fatigue. HENT: Negative for sore throat. Eyes: Negative for blurred vision. Respiratory: Negative for shortness of breath. Cardiovascular: Positive for leg swelling (intermittent which resolves on own). Negative for chest pain. Gastrointestinal: Negative.     Musculoskeletal: Negative. Neurological: Positive for dizziness (slight dizziness at times) and headaches (reports as very mild/intermittent). Psychiatric/Behavioral: Negative for depression. Yoana Connor, NP- assessed and reviewed     No flowsheet data found. Lindsay Hitchcock, who was evaluated through a patient-initiated, synchronous (real-time) audio only encounter, and/or her healthcare decision maker, is aware that it is a billable service, with coverage as determined by her insurance carrier. She provided verbal consent to proceed: Yes. She has not had a related appointment within my department in the past 7 days or scheduled within the next 24 hours. Consent For Provider Observation  [address] [place] [address] [facility] (\"[facility]\") has agreed to permit a provider employed by [address] [place] [address] (\"Observer\") to observe care to patients treated in its physician practices. Your physician has agreed to permit such Observer to observe his/her patient care activities. Such observation will not include any direct participation in the care provided to you. This writer has obtained verbal permission from this patient to permit Observer to observe their medical care received at [facility]. This patient agrees that they have been given the opportunity to refuse to give such consent and that they may withdraw their consent at any time, except to the extent [address] [place] [address] has relied on this consent and an Observer has already observed their medical care. This consent shall remain in effect for 1 year, unless otherwise withdrawn by this patient.       Bolivar Silva NP Student

## 2020-11-24 NOTE — PROGRESS NOTES
Reji Virk is a 77 y.o. female    Chief Complaint   Patient presents with    Medication Refill     1. Have you been to the ER, urgent care clinic since your last visit? Hospitalized since your last visit? No      2. Have you seen or consulted any other health care providers outside of the [address] [place] [address] since your last visit? Include any pap smears or colon screening.   No

## 2021-01-22 DIAGNOSIS — E78.00 PURE HYPERCHOLESTEROLEMIA: ICD-10-CM

## 2021-01-22 RX ORDER — SIMVASTATIN 20 MG/1
TABLET, FILM COATED ORAL
Qty: 90 TAB | Refills: 1 | Status: SHIPPED | OUTPATIENT
Start: 2021-01-22 | End: 2021-07-30 | Stop reason: SDUPTHER

## 2021-01-25 DIAGNOSIS — I10 ESSENTIAL HYPERTENSION: ICD-10-CM

## 2021-01-25 RX ORDER — AMLODIPINE BESYLATE 10 MG/1
TABLET ORAL
Qty: 90 TAB | Refills: 1 | Status: SHIPPED | OUTPATIENT
Start: 2021-01-25 | End: 2021-08-18 | Stop reason: SDUPTHER

## 2021-01-26 RX ORDER — LOSARTAN POTASSIUM 100 MG/1
TABLET ORAL
Qty: 90 TAB | Refills: 1 | Status: SHIPPED | OUTPATIENT
Start: 2021-01-26 | End: 2021-08-16

## 2021-02-18 DIAGNOSIS — I10 ESSENTIAL HYPERTENSION: ICD-10-CM

## 2021-02-18 RX ORDER — METOPROLOL TARTRATE 50 MG/1
TABLET ORAL
Qty: 90 TAB | Refills: 0 | Status: SHIPPED | OUTPATIENT
Start: 2021-02-18 | End: 2021-05-13

## 2021-04-28 DIAGNOSIS — E11.9 TYPE 2 DIABETES MELLITUS WITHOUT COMPLICATION, WITHOUT LONG-TERM CURRENT USE OF INSULIN (HCC): ICD-10-CM

## 2021-04-28 RX ORDER — METFORMIN HYDROCHLORIDE 500 MG/1
TABLET ORAL
Qty: 180 TAB | Refills: 1 | Status: SHIPPED | OUTPATIENT
Start: 2021-04-28 | End: 2021-08-18 | Stop reason: SDUPTHER

## 2021-05-01 DIAGNOSIS — M85.88 OSTEOPENIA OF OTHER SITE: ICD-10-CM

## 2021-05-03 RX ORDER — LANOLIN ALCOHOL/MO/W.PET/CERES
CREAM (GRAM) TOPICAL
Qty: 180 TAB | Refills: 4 | OUTPATIENT
Start: 2021-05-03

## 2021-05-10 NOTE — TELEPHONE ENCOUNTER
Dialed the number to make an appt since it's been a while since seen in office and pt is requesting meds. Number is out of service.

## 2021-05-13 DIAGNOSIS — I10 ESSENTIAL HYPERTENSION: ICD-10-CM

## 2021-05-13 RX ORDER — METOPROLOL TARTRATE 50 MG/1
TABLET ORAL
Qty: 90 TAB | Refills: 0 | Status: SHIPPED | OUTPATIENT
Start: 2021-05-13 | End: 2021-08-16

## 2021-07-25 DIAGNOSIS — I10 ESSENTIAL HYPERTENSION: ICD-10-CM

## 2021-07-25 DIAGNOSIS — E78.00 PURE HYPERCHOLESTEROLEMIA: ICD-10-CM

## 2021-07-26 RX ORDER — LOSARTAN POTASSIUM 100 MG/1
TABLET ORAL
Qty: 90 TABLET | Refills: 1 | OUTPATIENT
Start: 2021-07-26

## 2021-07-26 RX ORDER — AMLODIPINE BESYLATE 10 MG/1
TABLET ORAL
Qty: 90 TABLET | Refills: 1 | OUTPATIENT
Start: 2021-07-26

## 2021-07-30 RX ORDER — SIMVASTATIN 20 MG/1
TABLET, FILM COATED ORAL
Qty: 90 TABLET | Refills: 1 | Status: SHIPPED | OUTPATIENT
Start: 2021-07-30 | End: 2021-08-18 | Stop reason: SDUPTHER

## 2021-08-11 ENCOUNTER — VIRTUAL VISIT (OUTPATIENT)
Dept: INTERNAL MEDICINE CLINIC | Age: 66
End: 2021-08-11

## 2021-08-14 DIAGNOSIS — I10 ESSENTIAL HYPERTENSION: ICD-10-CM

## 2021-08-16 RX ORDER — METOPROLOL TARTRATE 50 MG/1
TABLET ORAL
Qty: 90 TABLET | Refills: 0 | Status: SHIPPED | OUTPATIENT
Start: 2021-08-16 | End: 2021-08-18 | Stop reason: SDUPTHER

## 2021-08-16 RX ORDER — LOSARTAN POTASSIUM 100 MG/1
TABLET ORAL
Qty: 90 TABLET | Refills: 1 | Status: SHIPPED | OUTPATIENT
Start: 2021-08-16 | End: 2021-08-18 | Stop reason: SDUPTHER

## 2021-08-18 ENCOUNTER — VIRTUAL VISIT (OUTPATIENT)
Dept: INTERNAL MEDICINE CLINIC | Age: 66
End: 2021-08-18
Payer: MEDICARE

## 2021-08-18 DIAGNOSIS — I10 ESSENTIAL HYPERTENSION: ICD-10-CM

## 2021-08-18 DIAGNOSIS — E78.00 PURE HYPERCHOLESTEROLEMIA: ICD-10-CM

## 2021-08-18 DIAGNOSIS — E11.9 TYPE 2 DIABETES MELLITUS WITHOUT COMPLICATION, WITHOUT LONG-TERM CURRENT USE OF INSULIN (HCC): Primary | ICD-10-CM

## 2021-08-18 DIAGNOSIS — E55.9 VITAMIN D DEFICIENCY: ICD-10-CM

## 2021-08-18 DIAGNOSIS — J44.9 CHRONIC OBSTRUCTIVE PULMONARY DISEASE, UNSPECIFIED COPD TYPE (HCC): ICD-10-CM

## 2021-08-18 PROCEDURE — 99442 PR PHYS/QHP TELEPHONE EVALUATION 11-20 MIN: CPT | Performed by: NURSE PRACTITIONER

## 2021-08-18 RX ORDER — METOPROLOL TARTRATE 50 MG/1
50 TABLET ORAL DAILY
Qty: 30 TABLET | Refills: 1 | Status: SHIPPED | OUTPATIENT
Start: 2021-08-18 | End: 2021-08-18

## 2021-08-18 RX ORDER — AMLODIPINE BESYLATE 10 MG/1
10 TABLET ORAL DAILY
Qty: 30 TABLET | Refills: 1 | Status: SHIPPED | OUTPATIENT
Start: 2021-08-18 | End: 2021-09-09 | Stop reason: SDUPTHER

## 2021-08-18 RX ORDER — METOPROLOL TARTRATE 50 MG/1
TABLET ORAL
Qty: 90 TABLET | Refills: 1 | Status: SHIPPED | OUTPATIENT
Start: 2021-08-18 | End: 2022-03-02

## 2021-08-18 RX ORDER — LOSARTAN POTASSIUM 100 MG/1
100 TABLET ORAL DAILY
Qty: 30 TABLET | Refills: 1 | Status: SHIPPED | OUTPATIENT
Start: 2021-08-18 | End: 2021-11-20

## 2021-08-18 RX ORDER — METFORMIN HYDROCHLORIDE 500 MG/1
500 TABLET ORAL 2 TIMES DAILY WITH MEALS
Qty: 60 TABLET | Refills: 1 | Status: SHIPPED | OUTPATIENT
Start: 2021-08-18 | End: 2022-02-01

## 2021-08-18 RX ORDER — SIMVASTATIN 20 MG/1
TABLET, FILM COATED ORAL
Qty: 30 TABLET | Refills: 1 | Status: SHIPPED | OUTPATIENT
Start: 2021-08-18 | End: 2021-11-22 | Stop reason: ALTCHOICE

## 2021-08-18 NOTE — PROGRESS NOTES
Verónica Cooper is a 74 y.o. female, evaluated via audio-only technology on 8/18/2021 for Hypertension, Diabetes, and Medication Refill  . Assessment & Plan:   Diagnoses and all orders for this visit:    1. Type 2 diabetes mellitus without complication, without long-term current use of insulin ([facility])  Will refill:  -     metFORMIN (GLUCOPHAGE) 500 mg tablet; Take 1 Tablet by mouth two (2) times daily (with meals). Will order  -     HEMOGLOBIN A1C WITH EAG  -     MICROALBUMIN, UR, RAND W/ MICROALB/CREAT RATIO    2. Chronic obstructive pulmonary disease, unspecified COPD type ([facility]  Will refill:  -     umeclidinium-vilanteroL (ANORO ELLIPTA) 62.5-25 mcg/actuation inhaler; Take 1 Puff by inhalation daily. DC spiriva    3. Essential hypertension  Will refill:  -     amLODIPine (NORVASC) 10 mg tablet; Take 1 Tablet by mouth daily. -     losartan (COZAAR) 100 mg tablet; Take 1 Tablet by mouth daily. Will order  -     CBC WITH AUTOMATED DIFF  -     METABOLIC PANEL, COMPREHENSIVE  -     TSH 3RD GENERATION    4. Pure hypercholesterolemia  Will refill:  -     simvastatin (ZOCOR) 20 mg tablet; TAKE 1 TABLET BY MOUTH EVERY DAY AT NIGHT  Will order  -     LIPID PANEL    5. Vitamin D deficiency  Will order  -     VITAMIN D, 25 HYDROXY    Patient encouraged to call or return to office if symptoms do not improve or worsen. Reviewed medications and side effects in detail. Reviewed plan of care with patient who acknowledges understanding and agrees. Follow-up with PCP, Dr. Jaiden Cruz, for in-office visit with BP check in 1 month, or sooner as needed. 12  Subjective: This is a patient of Dr. Jaiden Cruz who presents today for follow-up and medication refills. The patient says she is generally feeling well at time of today's visit and is without complaint. Prior to Admission medications    Medication Sig Start Date End Date Taking? Authorizing Provider   amLODIPine (NORVASC) 10 mg tablet Take 1 Tablet by mouth daily.  8/18/21 Yes Nia Green NP   umeclidinium-vilanteroL (ANORO ELLIPTA) 62.5-25 mcg/actuation inhaler Take 1 Puff by inhalation daily. YASH spiriva 8/18/21  Yes Nia Green NP   simvastatin (ZOCOR) 20 mg tablet TAKE 1 TABLET BY MOUTH EVERY DAY AT NIGHT 8/18/21  Yes Nia Green NP   metFORMIN (GLUCOPHAGE) 500 mg tablet Take 1 Tablet by mouth two (2) times daily (with meals). 8/18/21  Yes Nia Green NP   losartan (COZAAR) 100 mg tablet Take 1 Tablet by mouth daily. 8/18/21  Yes Nia Green NP   metoprolol tartrate (LOPRESSOR) 50 mg tablet Take 1 Tablet by mouth daily. 8/18/21 8/18/21 Yes Nia Green NP   fluticasone propionate (FLONASE) 50 mcg/actuation nasal spray 2 Sprays by Both Nostrils route daily. 3/8/20  Yes Christin Roblero PA-C   Nebulizer & Compressor machine 1 Each by Does Not Apply route every four (4) hours as needed for Wheezing. As directed 3/4/20  Yes Jose Hence, MD   albuterol (ACCUNEB) 1.25 mg/3 mL nebu Take 3 mL by inhalation every four (4) hours as needed for Wheezing (wheezing). 3/4/20  Yes Jose Hence, MD   albuterol (PROVENTIL HFA, VENTOLIN HFA, PROAIR HFA) 90 mcg/actuation inhaler Take 2 Puffs by inhalation every four (4) hours as needed for Wheezing. 3/4/20  Yes Sahuarita Hence, MD   guaiFENesin 200 mg/5 mL liqd Take 10 mL by mouth four (4) times daily as needed for Cough. 3/4/20  Yes Jose Hence, MD   ergocalciferol (ERGOCALCIFEROL) 1,250 mcg (50,000 unit) capsule Take 1 Cap by mouth every seven (7) days. 2/19/20  Yes Ricky Tee MD   calcium-vitamin D (OYSTER SHELL) 500 mg(1,250mg) -200 unit per tablet Take 1 Tab by mouth two (2) times daily (with meals).  2/13/20  Yes Ricky Tee MD   OXcarbazepine (TRILEPTAL) 300 mg tablet TAKE 2 TABLETS BY MOUTH AT BEDTIME 11/18/19  Yes Provider, Historical   buPROPion XL (WELLBUTRIN XL) 150 mg tablet TAKE 1 TABLET BY MOUTH EVERY DAY IN THE MORNING 10/31/19  Yes Ricky Tee MD   diclofenac (VOLTAREN) 1 % gel APPLY TO AFFECTED AREA TWO (2) TIMES DAILY AS NEEDED. 8/13/18  Yes Carolina Hendricks MD   ammonium lactate (LAC-HYDRIN) 12 % lotion rub in to affected area well 3/5/18  Yes Carolina Hendricks MD   polyethylene glycol (MIRALAX) 17 gram/dose powder TAKE 17 G BY MOUTH DAILY. 2/14/18  Yes Carolina Hendricks MD   SENNA PLUS 8.6-50 mg per tablet TAKE 1 TABLET EVERY DAY 2/14/18  Yes Carolina Hendricks MD   cholecalciferol, vitamin D3, (VITAMIN D3) 2,000 unit tab Take  by mouth. Yes Provider, Historical   FLUoxetine (PROZAC) 20 mg capsule TAKE 1 TABLET BY MOUTH EVERY MORNING. 10/24/16  Yes Carolina Hendricks MD   famotidine (PEPCID) 20 mg tablet Take 1 Tab by mouth nightly. 4/11/16  Yes Carolina Hendricks MD   Blood-Glucose Meter monitoring kit Check sugar QD 12/9/13  Yes Carolina Hendricks MD   aspirin delayed-release 81 mg tablet Take  by mouth daily. Yes Provider, Historical   metoprolol tartrate (LOPRESSOR) 50 mg tablet TAKE 1 TABLET BY MOUTH EVERY DAY 8/18/21   Melisa Verduzco NP   metoprolol tartrate (LOPRESSOR) 50 mg tablet TAKE 1 TABLET BY MOUTH EVERY DAY 8/16/21 8/18/21  Melisa Verduzco NP   losartan (COZAAR) 100 mg tablet TAKE 1 TABLET BY MOUTH EVERY DAY 8/16/21 8/18/21  Melisa Verduzco NP   simvastatin (ZOCOR) 20 mg tablet TAKE 1 TABLET BY MOUTH EVERY DAY AT NIGHT 7/30/21 8/18/21  Melisa Verduzco NP   metFORMIN (GLUCOPHAGE) 500 mg tablet TAKE 1 TABLET BY MOUTH TWICE A DAY WITH MEALS 4/28/21 8/18/21  Mauricio Verduzco NP   amLODIPine (NORVASC) 10 mg tablet TAKE 1 TABLET BY MOUTH EVERY DAY 1/25/21 8/18/21  Melisa Verduzco NP   glucose blood VI test strips (ASCENSIA AUTODISC VI, ONE TOUCH ULTRA TEST VI) strip Check blood sugar once daily  Patient not taking: Reported on 8/18/2021 11/24/20   Joaquin Leyva NP   lancets misc Check blood sugar one time a day  Patient not taking: Reported on 8/18/2021 11/24/20   Joaquin Leyva NP   umeclidinium-vilanteroL (ANORO ELLIPTA) 62.5-25 mcg/actuation inhaler Take 1 Puff by inhalation daily.  YASH spiriva 4/7/20 8/18/21  Ana Link MD   [facility] TEST strip CHECK BLOOD SUGAR DAILY  Patient not taking: Reported on 8/18/2021 10/6/17   Dwain Ivy NP     Allergies   Allergen Reactions    Contrast Agent [Iodine] Anaphylaxis    Pcn [Penicillins] Rash    Pcn [Penicillins] Hives     Past Medical History:   Diagnosis Date    COPD     early stage of emphysema    Depression     bipolar disease    Diabetes ([facility]     Diabetes ([facility]     for 15 years    Hypercholesterolemia     Hypertension     LBBB (left bundle branch block)     Psychiatric disorder     BIpolar    Renal cyst, right     left kidney in pelvis    Thyroid disease     thyroid nodule await for biopsy     Past Surgical History:   Procedure Laterality Date    HX BREAST BIOPSY Right     cyst removed at age 22   Kyrievis Concepcion WEEKS BREAST SURGERY PROCEDURE UNLISTED      breast cyst removed from left breast       Review of Systems   Constitutional: Negative for chills and fever. HENT: Negative. Eyes: Negative. Respiratory: Negative. Cardiovascular: Negative. Gastrointestinal: Negative. Genitourinary: Negative. Musculoskeletal: Negative. Skin: Negative. Neurological: Negative. Endo/Heme/Allergies: Negative. Psychiatric/Behavioral: Negative. Patient-Reported Vitals 8/18/2021   Patient-Reported LMP Hystrectomy       Jaqueline Tyler, who was evaluated through a patient-initiated, synchronous (real-time) audio only encounter, and/or her healthcare decision maker, is aware that it is a billable service, with coverage as determined by her insurance carrier. She provided verbal consent to proceed: Yes. She has not had a related appointment within my department in the past 7 days or scheduled within the next 24 hours.     On this date 08/18/2021 I have spent 15 minutes reviewing previous notes, test results and face to face (virtual) with the patient discussing the diagnosis and importance of compliance with the treatment plan as well as documenting on the day of the visit.     Chloé Lomeli NP

## 2021-08-18 NOTE — PROGRESS NOTES
Health Maintenance Due   Topic Date Due    COVID-19 Vaccine (1) Never done    DTaP/Tdap/Td series (1 - Tdap) Never done    Shingrix Vaccine Age 50> (1 of 2) Never done    Low dose CT lung screening  Never done    Pneumococcal 65+ years (2 of 2 - PPSV23) 07/07/2018    Foot Exam Q1  08/07/2020    Breast Cancer Screen Mammogram  08/07/2020    Lipid Screen  08/07/2020    A1C test (Diabetic or Prediabetic)  02/13/2021    MICROALBUMIN Q1  02/13/2021    Eye Exam Retinal or Dilated  03/08/2021       Chief Complaint   Patient presents with    Hypertension    Diabetes    Medication Refill       1. Have you been to the ER, urgent care clinic since your last visit? Hospitalized since your last visit? No    2. Have you seen or consulted any other health care providers outside of the [address] [place] [address] since your last visit? Include any pap smears or colon screening. No    3) Do you have an Advance Directive on file? no    4) Are you interested in receiving information on Advance Directives? NO      Patient is accompanied by self I have received verbal consent from Jelani Lawrence to discuss any/all medical information while they are present in the room.

## 2021-09-09 DIAGNOSIS — I10 ESSENTIAL HYPERTENSION: ICD-10-CM

## 2021-09-09 RX ORDER — AMLODIPINE BESYLATE 10 MG/1
TABLET ORAL
Qty: 30 TABLET | Refills: 1 | Status: SHIPPED | OUTPATIENT
Start: 2021-09-09 | End: 2021-10-07 | Stop reason: SDUPTHER

## 2021-11-11 ENCOUNTER — TELEPHONE (OUTPATIENT)
Dept: INTERNAL MEDICINE CLINIC | Age: 66
End: 2021-11-11

## 2021-11-11 NOTE — TELEPHONE ENCOUNTER
----- Message from Mahesh Hardin sent at 11/11/2021 10:10 AM EST -----  Subject: Appointment Request    Reason for Call: Routine Physical Exam    QUESTIONS  Type of Appointment? Established Patient  Reason for appointment request? Requested Provider unavailable - Jose Martin Bose MD  Additional Information for Provider? Patient stated she is established   with Dr. Romi Plascencia. She is needing to schedule her yearly physical and there   were no available appointments with her pcp. She would like to know if   there is anyway to be able to squeeze her in to be seen. If the office   could please give her a call to let her know if this is a option. Please   advise.   ---------------------------------------------------------------------------  --------------  CALL BACK INFO  What is the best way for the office to contact you? OK to leave message on   voicemail  Preferred Call Back Phone Number? [phone]  ---------------------------------------------------------------------------  --------------  SCRIPT ANSWERS  Relationship to Patient? Self  Specialty Confirmation? Primary Care  (If the patient has Medicare as their primary insurance coverage ask this   question) Are you requesting a Medicare Annual Wellness Visit? No  (Is the patient requesting a pap smear with their physical exam?)? No  (Is the patient requesting their annual physical and does not need PAP or   AWV per above?)? Yes   Have you been diagnosed with, awaiting test results for, or told that you   are suspected of having COVID-19 (Coronavirus)? (If patient has tested   negative or was tested as a requirement for work, school, or travel and   not based on symptoms, answer no)? No  Within the past two weeks have you developed any of the following symptoms   (answer no if symptoms have been present longer than 2 weeks or began   more than 2 weeks ago)?  Fever or Chills, Cough, Shortness of breath or   difficulty breathing, Loss of taste or smell, Sore throat, Nasal congestion, Sneezing or runny nose, Fatigue or generalized body aches   (answer no if pain is specific to a body part e.g. back pain), Diarrhea,   Headache?  Yes

## 2021-11-12 DIAGNOSIS — E11.9 TYPE 2 DIABETES MELLITUS WITHOUT COMPLICATION, WITHOUT LONG-TERM CURRENT USE OF INSULIN (HCC): ICD-10-CM

## 2021-11-12 DIAGNOSIS — E11.21 TYPE 2 DIABETES WITH NEPHROPATHY (HCC): ICD-10-CM

## 2021-11-12 RX ORDER — BLOOD SUGAR DIAGNOSTIC
STRIP MISCELLANEOUS
Qty: 100 STRIP | Refills: 3 | Status: SHIPPED | OUTPATIENT
Start: 2021-11-12 | End: 2022-10-20 | Stop reason: SDUPTHER

## 2021-11-20 DIAGNOSIS — I10 ESSENTIAL HYPERTENSION: ICD-10-CM

## 2021-11-20 RX ORDER — LOSARTAN POTASSIUM 100 MG/1
TABLET ORAL
Qty: 90 TABLET | Refills: 1 | Status: SHIPPED | OUTPATIENT
Start: 2021-11-20 | End: 2021-11-22 | Stop reason: SDUPTHER

## 2021-11-22 ENCOUNTER — OFFICE VISIT (OUTPATIENT)
Dept: INTERNAL MEDICINE CLINIC | Age: 66
End: 2021-11-22
Payer: MEDICARE

## 2021-11-22 VITALS
WEIGHT: 154 LBS | BODY MASS INDEX: 26.29 KG/M2 | OXYGEN SATURATION: 99 % | TEMPERATURE: 98.7 F | DIASTOLIC BLOOD PRESSURE: 80 MMHG | RESPIRATION RATE: 17 BRPM | HEIGHT: 64 IN | HEART RATE: 72 BPM | SYSTOLIC BLOOD PRESSURE: 134 MMHG

## 2021-11-22 DIAGNOSIS — H61.20 WAX IN EAR: ICD-10-CM

## 2021-11-22 DIAGNOSIS — Z71.89 ACP (ADVANCE CARE PLANNING): ICD-10-CM

## 2021-11-22 DIAGNOSIS — H26.9 CATARACT OF BOTH EYES, UNSPECIFIED CATARACT TYPE: ICD-10-CM

## 2021-11-22 DIAGNOSIS — E78.00 PURE HYPERCHOLESTEROLEMIA: ICD-10-CM

## 2021-11-22 DIAGNOSIS — E55.9 VITAMIN D DEFICIENCY: ICD-10-CM

## 2021-11-22 DIAGNOSIS — E11.9 TYPE 2 DIABETES MELLITUS WITHOUT COMPLICATION, WITHOUT LONG-TERM CURRENT USE OF INSULIN (HCC): Primary | ICD-10-CM

## 2021-11-22 DIAGNOSIS — I10 ESSENTIAL HYPERTENSION: ICD-10-CM

## 2021-11-22 DIAGNOSIS — F31.77 BIPOLAR DISORDER, IN PARTIAL REMISSION, MOST RECENT EPISODE MIXED (HCC): ICD-10-CM

## 2021-11-22 DIAGNOSIS — Z00.00 MEDICARE ANNUAL WELLNESS VISIT, SUBSEQUENT: ICD-10-CM

## 2021-11-22 DIAGNOSIS — Z01.818 PRE-OPERATIVE CLEARANCE: ICD-10-CM

## 2021-11-22 PROBLEM — E11.21 TYPE 2 DIABETES WITH NEPHROPATHY (HCC): Status: RESOLVED | Noted: 2018-03-05 | Resolved: 2021-11-22

## 2021-11-22 PROBLEM — E11.22 TYPE 2 DIABETES MELLITUS WITH CHRONIC KIDNEY DISEASE (HCC): Status: ACTIVE | Noted: 2021-11-22

## 2021-11-22 PROCEDURE — G8752 SYS BP LESS 140: HCPCS | Performed by: INTERNAL MEDICINE

## 2021-11-22 PROCEDURE — 99497 ADVNCD CARE PLAN 30 MIN: CPT | Performed by: INTERNAL MEDICINE

## 2021-11-22 PROCEDURE — G8427 DOCREV CUR MEDS BY ELIG CLIN: HCPCS | Performed by: INTERNAL MEDICINE

## 2021-11-22 PROCEDURE — 2022F DILAT RTA XM EVC RTNOPTHY: CPT | Performed by: INTERNAL MEDICINE

## 2021-11-22 PROCEDURE — 3017F COLORECTAL CA SCREEN DOC REV: CPT | Performed by: INTERNAL MEDICINE

## 2021-11-22 PROCEDURE — G8754 DIAS BP LESS 90: HCPCS | Performed by: INTERNAL MEDICINE

## 2021-11-22 PROCEDURE — 3046F HEMOGLOBIN A1C LEVEL >9.0%: CPT | Performed by: INTERNAL MEDICINE

## 2021-11-22 PROCEDURE — G0463 HOSPITAL OUTPT CLINIC VISIT: HCPCS | Performed by: INTERNAL MEDICINE

## 2021-11-22 PROCEDURE — G8536 NO DOC ELDER MAL SCRN: HCPCS | Performed by: INTERNAL MEDICINE

## 2021-11-22 PROCEDURE — G9899 SCRN MAM PERF RSLTS DOC: HCPCS | Performed by: INTERNAL MEDICINE

## 2021-11-22 PROCEDURE — G9717 DOC PT DX DEP/BP F/U NT REQ: HCPCS | Performed by: INTERNAL MEDICINE

## 2021-11-22 PROCEDURE — G0439 PPPS, SUBSEQ VISIT: HCPCS | Performed by: INTERNAL MEDICINE

## 2021-11-22 PROCEDURE — G8399 PT W/DXA RESULTS DOCUMENT: HCPCS | Performed by: INTERNAL MEDICINE

## 2021-11-22 PROCEDURE — G8419 CALC BMI OUT NRM PARAM NOF/U: HCPCS | Performed by: INTERNAL MEDICINE

## 2021-11-22 PROCEDURE — 1101F PT FALLS ASSESS-DOCD LE1/YR: CPT | Performed by: INTERNAL MEDICINE

## 2021-11-22 PROCEDURE — 99214 OFFICE O/P EST MOD 30 MIN: CPT | Performed by: INTERNAL MEDICINE

## 2021-11-22 PROCEDURE — 1090F PRES/ABSN URINE INCON ASSESS: CPT | Performed by: INTERNAL MEDICINE

## 2021-11-22 RX ORDER — SIMVASTATIN 20 MG/1
1 TABLET, FILM COATED ORAL
COMMUNITY
End: 2021-11-22 | Stop reason: ALTCHOICE

## 2021-11-22 RX ORDER — LOSARTAN POTASSIUM 100 MG/1
100 TABLET ORAL DAILY
Qty: 90 TABLET | Refills: 1 | Status: SHIPPED | OUTPATIENT
Start: 2021-11-22

## 2021-11-22 RX ORDER — SIMVASTATIN 20 MG/1
TABLET, FILM COATED ORAL
Qty: 90 TABLET | Refills: 1 | Status: SHIPPED | OUTPATIENT
Start: 2021-11-22 | End: 2022-08-15

## 2021-11-22 NOTE — ACP (ADVANCE CARE PLANNING)

## 2021-11-22 NOTE — PROGRESS NOTES
This is the Subsequent Medicare Annual Wellness Exam, performed 12 months or more after the Initial AWV or the last Subsequent AWV    I have reviewed the patient's medical history in detail and updated the computerized patient record. Assessment/Plan   Education and counseling provided:  Are appropriate based on today's review and evaluation  End-of-Life planning (with patient's consent)  Pneumococcal Vaccine  Cardiovascular screening blood test  Screening for glaucoma    1. Type 2 diabetes mellitus without complication, without long-term current use of insulin (HCC)  -     MICROALBUMIN, UR, RAND W/ MICROALB/CREAT RATIO  -     HEMOGLOBIN A1C WITH EAG  2. Essential hypertension  -     losartan (COZAAR) 100 mg tablet; Take 1 Tablet by mouth daily. , Normal, Disp-90 Tablet, R-1  -     CBC WITH AUTOMATED DIFF  -     METABOLIC PANEL, COMPREHENSIVE  3. Bipolar disorder, in partial remission, most recent episode mixed (HCC)  -     CBC WITH AUTOMATED DIFF  -     METABOLIC PANEL, COMPREHENSIVE  4. Pure hypercholesterolemia  -     simvastatin (ZOCOR) 20 mg tablet; TAKE 1 TABLET BY MOUTH EVERY DAY AT NIGHT, Normal, Disp-90 Tablet, R-1  -     METABOLIC PANEL, COMPREHENSIVE  -     LIPID PANEL  5. Vitamin D deficiency  -     VITAMIN D, 25 HYDROXY       Depression Risk Factor Screening     3 most recent PHQ Screens 11/22/2021   PHQ Not Done -   Little interest or pleasure in doing things Several days   Feeling down, depressed, irritable, or hopeless Several days   Total Score PHQ 2 2       Alcohol Risk Screen    Do you average more than 1 drink per night or more than 7 drinks a week:  No    On any one occasion in the past three months have you have had more than 3 drinks containing alcohol:  Yes        Functional Ability and Level of Safety    Hearing: Hearing is good. Activities of Daily Living: The home contains: no safety equipment.   Patient does total self care      Ambulation: with no difficulty     Fall Risk:  Fall Risk Assessment, last 12 mths 11/22/2021   Able to walk? Yes   Fall in past 12 months? 0   Do you feel unsteady?  0   Are you worried about falling 0      Abuse Screen:  Patient is not abused       Cognitive Screening    Has your family/caregiver stated any concerns about your memory: no     Cognitive Screening: Normal - MMSE (Mini Mental Status Exam)    Health Maintenance Due     Health Maintenance Due   Topic Date Due    COVID-19 Vaccine (1) Never done    DTaP/Tdap/Td series (1 - Tdap) Never done    Shingrix Vaccine Age 50> (1 of 2) Never done    Low dose CT lung screening  Never done    Pneumococcal 65+ years (2 of 2 - PPSV23) 07/07/2018    Breast Cancer Screen Mammogram  08/07/2020    Lipid Screen  08/07/2020    A1C test (Diabetic or Prediabetic)  02/13/2021    MICROALBUMIN Q1  02/13/2021    Eye Exam Retinal or Dilated  03/08/2021    Flu Vaccine (1) Never done       Patient Care Team   Patient Care Team:  Mode SEGURA (Inactive) as PCP - General (Internal Medicine)  Marylen Fielding, NP [facility] PCP [facility]  Angelica Locke MD (Internal Medicine)    History     Patient Active Problem List   Diagnosis Code    Pure hypercholesterolemia E78.00    COPD (chronic obstructive pulmonary disease) ([address] J44.9    Morbid obesity ([address] E66.01    Bipolar disorder ([address] F31.9    HTN (hypertension) I10    Type 2 diabetes mellitus without complication ([address] F26.6    Colonoscopy refused Z53.20    Hyponatremia E87.1    LBBB (left bundle branch block) I44.7    Refused pneumococcal vaccine Z28.21    Type 2 diabetes mellitus with chronic kidney disease ([address] E11.22     Past Medical History:   Diagnosis Date    COPD     early stage of emphysema    Depression     bipolar disease    Diabetes ([address]     Diabetes ([address]     for 15 years    Hypercholesterolemia     Hypertension     LBBB (left bundle branch block)     Psychiatric disorder     BIpolar    Renal cyst, right     left kidney in pelvis    Thyroid disease     thyroid nodule await for biopsy      Past Surgical History:   Procedure Laterality Date    HX BREAST BIOPSY Right     cyst removed at age 22   24 [facility] BREAST SURGERY PROCEDURE UNLISTED      breast cyst removed from left breast     Current Outpatient Medications   Medication Sig Dispense Refill    losartan (COZAAR) 100 mg tablet Take 1 Tablet by mouth daily. 90 Tablet 1    simvastatin (ZOCOR) 20 mg tablet TAKE 1 TABLET BY MOUTH EVERY DAY AT NIGHT 90 Tablet 1    glucose blood VI test strips (OneTouch Ultra Test) strip USE TO CHECK BLOOD SUGAR ONCE DAILY 100 Strip 3    amLODIPine (NORVASC) 10 mg tablet Take 1 Tablet by mouth daily. 90 Tablet 1    umeclidinium-vilanteroL (ANORO ELLIPTA) 62.5-25 mcg/actuation inhaler Take 1 Puff by inhalation daily. DC spiriva 1 Inhaler 1    metFORMIN (GLUCOPHAGE) 500 mg tablet Take 1 Tablet by mouth two (2) times daily (with meals). 60 Tablet 1    metoprolol tartrate (LOPRESSOR) 50 mg tablet TAKE 1 TABLET BY MOUTH EVERY DAY 90 Tablet 1    lancets misc Check blood sugar one time a day 1 Package 11    fluticasone propionate (FLONASE) 50 mcg/actuation nasal spray 2 Sprays by Both Nostrils route daily. 1 Bottle 0    Nebulizer & Compressor machine 1 Each by Does Not Apply route every four (4) hours as needed for Wheezing. As directed 1 Each 0    albuterol (ACCUNEB) 1.25 mg/3 mL nebu Take 3 mL by inhalation every four (4) hours as needed for Wheezing (wheezing). 25 Each 0    albuterol (PROVENTIL HFA, VENTOLIN HFA, PROAIR HFA) 90 mcg/actuation inhaler Take 2 Puffs by inhalation every four (4) hours as needed for Wheezing. 1 Inhaler 0    guaiFENesin 200 mg/5 mL liqd Take 10 mL by mouth four (4) times daily as needed for Cough. 120 mL 0    ergocalciferol (ERGOCALCIFEROL) 1,250 mcg (50,000 unit) capsule Take 1 Cap by mouth every seven (7) days.  4 Cap 3    calcium-vitamin D (OYSTER SHELL) 500 mg(1,250mg) -200 unit per tablet Take 1 Tab by mouth two (2) times daily (with meals). 180 Tab 4    OXcarbazepine (TRILEPTAL) 300 mg tablet TAKE 2 TABLETS BY MOUTH AT BEDTIME  1    buPROPion XL (WELLBUTRIN XL) 150 mg tablet TAKE 1 TABLET BY MOUTH EVERY DAY IN THE MORNING 90 Tab 1    diclofenac (VOLTAREN) 1 % gel APPLY TO AFFECTED AREA TWO (2) TIMES DAILY AS NEEDED. 100 g 1    ammonium lactate (LAC-HYDRIN) 12 % lotion rub in to affected area well 400 mL 2    polyethylene glycol (MIRALAX) 17 gram/dose powder TAKE 17 G BY MOUTH DAILY. 1054 g 0    SENNA PLUS 8.6-50 mg per tablet TAKE 1 TABLET EVERY DAY 30 Tab 2    ONETOUCH ULTRA TEST strip CHECK BLOOD SUGAR DAILY 100 Strip 0    cholecalciferol, vitamin D3, (VITAMIN D3) 2,000 unit tab Take  by mouth.  FLUoxetine (PROZAC) 20 mg capsule TAKE 1 TABLET BY MOUTH EVERY MORNING. 90 Cap 1    famotidine (PEPCID) 20 mg tablet Take 1 Tab by mouth nightly. 30 Tab 0    Blood-Glucose Meter monitoring kit Check sugar QD 1 Kit 0    aspirin delayed-release 81 mg tablet Take  by mouth daily. Allergies   Allergen Reactions    Contrast Agent [Iodine] Anaphylaxis    Pcn [Penicillins] Rash    Pcn [Penicillins] Hives       Family History   Problem Relation Age of Onset    Diabetes Mother     Hypertension Mother    [address] [facility] Elevated Lipids Mother     Cancer Mother         breast ac    Breast Cancer Mother     Cancer Father         tongue ca    Diabetes Sister     Breast Cancer Sister     Heart Disease Brother      Social History     Tobacco Use    Smoking status: Current Every Day Smoker     Packs/day: 1.00     Years: 30.00     Pack years: 30.00     Types: Cigarettes    Smokeless tobacco: Never Used   Substance Use Topics    Alcohol use: Yes     Comment: 1 beer per week.          Sherman Barnes MD

## 2021-11-22 NOTE — PROGRESS NOTES
HISTORY OF PRESENT ILLNESS  Oscar Haddad is a 74 y.o. female here for follow up. She needs preop clearance for both cataract surgery. Has diabetes. watching diet. On metformin. Need foot exam.  Need lab work. She has bipolar disorder. seeing psych. stable. Seeing psychiatrist Dr. Barbara Ayers. .  On multiple medications. Has hypertension, compliant with medicine. No chest pain palpitation or shortness of breath. She has COPD and she is still a smoker. Using inhaler. Here for Medicare wellness visit. Has no living will. Hypertension   Pertinent negatives include no chest pain, no palpitations and no blurred vision. Diabetes  Pertinent negatives include no chest pain. Cholesterol Problem  Pertinent negatives include no chest pain. Medication Evaluation  Pertinent negatives include no chest pain. COPD  Pertinent negatives include no chest pain. Mental Health Problem  Pertinent negatives include no chest pain. Pre-op Exam  Pertinent negatives include no chest pain. Medication Refill  Pertinent negatives include no chest pain. Review of Systems   Constitutional: Negative. Negative for chills and fever. HENT: Negative. Eyes: Negative. Negative for blurred vision and double vision. Respiratory: Positive for cough and wheezing. Cardiovascular: Negative. Negative for chest pain and palpitations. Gastrointestinal: Negative. Genitourinary: Negative. Musculoskeletal: Negative. Neurological: Negative. Psychiatric/Behavioral: Negative. Physical Exam  Constitutional:       General: She is not in acute distress. Appearance: Normal appearance. She is well-developed. She is obese. HENT:      Right Ear: There is impacted cerumen. Left Ear: There is impacted cerumen. Nose: Nose normal.   Neck:      Thyroid: No thyromegaly. Vascular: No JVD. Cardiovascular:      Rate and Rhythm: Normal rate and regular rhythm. Heart sounds: Normal heart sounds. Pulmonary:      Effort: Pulmonary effort is normal. No respiratory distress. Breath sounds: Wheezing present. Comments: Sporadic wheezing and rhonchi present in both lung fields. Abdominal:      General: Bowel sounds are normal.      Palpations: Abdomen is soft. Musculoskeletal:         General: No tenderness. Cervical back: Normal range of motion and neck supple. Comments:   Diabetic foot exam:     Left Foot:   Visual Exam: normal    Pulse DP: 2+ (normal)   Filament test: normal sensation    Vibratory sensation: normal      Right Foot:   Visual Exam: normal    Pulse DP: 2+ (normal)   Filament test: normal sensation    Vibratory sensation: normal       Neurological:      Mental Status: She is alert. Psychiatric:         Mood and Affect: Mood normal.         Behavior: Behavior normal.         ASSESSMENT and PLAN  Diagnoses and all orders for this visit:    1. Type 2 diabetes mellitus without complication, without long-term current use of insulin (HCC)    Stable. On Metformin. Will check,  -     MICROALBUMIN, UR, RAND W/ MICROALB/CREAT RATIO  -     HEMOGLOBIN A1C WITH EAG    2. Essential hypertension    Stable blood pressure. Will refill,  -     losartan (COZAAR) 100 mg tablet; Take 1 Tablet by mouth daily.  -     CBC WITH AUTOMATED DIFF  Seeing psychiatrist.  On multiple medications including Trileptal, Wellbutrin XL and fluoxetine.  -     METABOLIC PANEL, COMPREHENSIVE    3. Bipolar disorder, in partial remission, most recent episode mixed [facility])  Seeing psychiatrist.  On Trileptal, fluoxetine and Wellbutrin XL. Will check,  -     CBC WITH AUTOMATED DIFF  -     METABOLIC PANEL, COMPREHENSIVE    4. Pure hypercholesterolemia    Stable.   Will refill,  -     simvastatin (ZOCOR) 20 mg tablet; TAKE 1 TABLET BY MOUTH EVERY DAY AT NIGHT  -     METABOLIC PANEL, COMPREHENSIVE  -     LIPID PANEL    5. Vitamin D deficiency  -     VITAMIN D, 25 HYDROXY    6. Medicare annual wellness visit, subsequent    7. ACP (advance care planning)    8. Pre-operative clearance  Healthy physical.  Clear for cataract surgery. 9. Cataract of both eyes, unspecified cataract type  Await for cataract surgery. 10. Wax in ear    We will give,  -     carbamide peroxide (DEBROX) 6.5 % otic solution; Administer 5 Drops into each ear two (2) times a day. Discussed expected course/resolution/complications of diagnosis in detail with patient. Medication risks/benefits/costs/interactions/alternatives discussed with patient. Discussed COVID-19 infection precaution with patient. Pt was given an after visit summary which includes diagnoses, current medications & vitals. Pt expressed understanding with the diagnosis and plan.

## 2021-11-22 NOTE — PATIENT INSTRUCTIONS
Medicare Wellness Visit, Female     The best way to live healthy is to have a lifestyle where you eat a well-balanced diet, exercise regularly, limit alcohol use, and quit all forms of tobacco/nicotine, if applicable. Regular preventive services are another way to keep healthy. Preventive services (vaccines, screening tests, monitoring & exams) can help personalize your care plan, which helps you manage your own care. Screening tests can find health problems at the earliest stages, when they are easiest to treat. [facility] follows the current, evidence-based guidelines published by the [facility] ([facility]) when recommending preventive services for our patients. Because we follow these guidelines, sometimes recommendations change over time as research supports it. (For example, mammograms used to be recommended annually. Even though Medicare will still pay for an annual mammogram, the newer guidelines recommend a mammogram every two years for women of average risk). Of course, you and your doctor may decide to screen more often for some diseases, based on your risk and your co-morbidities (chronic disease you are already diagnosed with). Preventive services for you include:  - Medicare offers their members a free annual wellness visit, which is time for you and your primary care provider to discuss and plan for your preventive service needs. Take advantage of this benefit every year!  -All adults over the age of 72 should receive the recommended pneumonia vaccines. Current USPSTF guidelines recommend a series of two vaccines for the best pneumonia protection.   -All adults should have a flu vaccine yearly and a tetanus vaccine every 10 years.   -All adults age 48 and older should receive the shingles vaccines (series of two vaccines).       -All adults age 38-68 who are overweight should have a diabetes screening test once every three years.   -All adults born between [birth year] and 1967 should be screened once for Hepatitis C.  -Other screening tests and preventive services for persons with diabetes include: an eye exam to screen for diabetic retinopathy, a kidney function test, a foot exam, and stricter control over your cholesterol.   -Cardiovascular screening for adults with routine risk involves an electrocardiogram (ECG) at intervals determined by your doctor.   -Colorectal cancer screenings should be done for adults age 54-65 with no increased risk factors for colorectal cancer. There are a number of acceptable methods of screening for this type of cancer. Each test has its own benefits and drawbacks. Discuss with your doctor what is most appropriate for you during your annual wellness visit. The different tests include: colonoscopy (considered the best screening method), a fecal occult blood test, a fecal DNA test, and sigmoidoscopy.    -A bone mass density test is recommended when a woman turns 65 to screen for osteoporosis. This test is only recommended one time, as a screening. Some providers will use this same test as a disease monitoring tool if you already have osteoporosis. -Breast cancer screenings are recommended every other year for women of normal risk, age 54-69.  -Cervical cancer screenings for women over age 72 are only recommended with certain risk factors.      Here is a list of your current Health Maintenance items (your personalized list of preventive services) with a due date:  Health Maintenance Due   Topic Date Due    COVID-19 Vaccine (1) Never done    DTaP/Tdap/Td  (1 - Tdap) Never done    Shingles Vaccine (1 of 2) Never done    Smoker or Former Smoker - Annual Lung Cancer Screen  Never done    Pneumococcal Vaccine (2 of 2 - PPSV23) 07/07/2018    Mammogram  08/07/2020    Cholesterol Test   08/07/2020    Hemoglobin A1C    02/13/2021    Albumin Urine Test  02/13/2021    Eye Exam  03/08/2021    Yearly Flu Vaccine (1) Never done

## 2021-11-22 NOTE — PROGRESS NOTES
Health Maintenance Due   Topic Date Due    COVID-19 Vaccine (1) Never done    DTaP/Tdap/Td series (1 - Tdap) Never done    Shingrix Vaccine Age 50> (1 of 2) Never done    Low dose CT lung screening  Never done    Pneumococcal 65+ years (2 of 2 - PPSV23) 07/07/2018    Foot Exam Q1  08/07/2020    Breast Cancer Screen Mammogram  08/07/2020    Lipid Screen  08/07/2020    A1C test (Diabetic or Prediabetic)  02/13/2021    MICROALBUMIN Q1  02/13/2021    Eye Exam Retinal or Dilated  03/08/2021    Flu Vaccine (1) Never done    Medicare Yearly Exam  11/25/2021       Chief Complaint   Patient presents with    Pre-op Exam     Cataract eye surgery     Annual Wellness Visit    Diabetes    Medication Refill       1. Have you been to the ER, urgent care clinic since your last visit? Hospitalized since your last visit? No    2. Have you seen or consulted any other health care providers outside of the [address] [place] [address] since your last visit? Include any pap smears or colon screening. No    3) Do you have an Advance Directive on file? no    4) Are you interested in receiving information on Advance Directives? NO      Patient is accompanied by self I have received verbal consent from Erlinda Muñoz to discuss any/all medical information while they are present in the room.

## 2021-11-23 DIAGNOSIS — L85.8 KERATOSIS PILARIS: ICD-10-CM

## 2021-11-23 DIAGNOSIS — L30.9 DERMATITIS: ICD-10-CM

## 2021-11-23 LAB
25(OH)D3+25(OH)D2 SERPL-MCNC: 26.9 NG/ML (ref 30–100)
ALBUMIN SERPL-MCNC: 4.3 G/DL (ref 3.8–4.8)
ALBUMIN/CREAT UR: 896 MG/G CREAT (ref 0–29)
ALBUMIN/GLOB SERPL: 1.5 {RATIO} (ref 1.2–2.2)
ALP SERPL-CCNC: 109 IU/L (ref 44–121)
ALT SERPL-CCNC: 8 IU/L (ref 0–32)
AST SERPL-CCNC: 13 IU/L (ref 0–40)
BASOPHILS # BLD AUTO: 0 X10E3/UL (ref 0–0.2)
BASOPHILS NFR BLD AUTO: 0 %
BILIRUB SERPL-MCNC: 0.2 MG/DL (ref 0–1.2)
BUN SERPL-MCNC: 5 MG/DL (ref 8–27)
BUN/CREAT SERPL: 9 (ref 12–28)
CALCIUM SERPL-MCNC: 10.1 MG/DL (ref 8.7–10.3)
CHLORIDE SERPL-SCNC: 94 MMOL/L (ref 96–106)
CHOLEST SERPL-MCNC: 202 MG/DL (ref 100–199)
CO2 SERPL-SCNC: 19 MMOL/L (ref 20–29)
CREAT SERPL-MCNC: 0.57 MG/DL (ref 0.57–1)
CREAT UR-MCNC: 52.5 MG/DL
EOSINOPHIL # BLD AUTO: 0.1 X10E3/UL (ref 0–0.4)
EOSINOPHIL NFR BLD AUTO: 0 %
ERYTHROCYTE [DISTWIDTH] IN BLOOD BY AUTOMATED COUNT: 12.8 % (ref 11.7–15.4)
EST. AVERAGE GLUCOSE BLD GHB EST-MCNC: 123 MG/DL
GLOBULIN SER CALC-MCNC: 2.8 G/DL (ref 1.5–4.5)
GLUCOSE SERPL-MCNC: 117 MG/DL (ref 65–99)
HBA1C MFR BLD: 5.9 % (ref 4.8–5.6)
HCT VFR BLD AUTO: 42.9 % (ref 34–46.6)
HDLC SERPL-MCNC: 107 MG/DL
HGB BLD-MCNC: 15.5 G/DL (ref 11.1–15.9)
IMM GRANULOCYTES # BLD AUTO: 0 X10E3/UL (ref 0–0.1)
IMM GRANULOCYTES NFR BLD AUTO: 0 %
IMP & REVIEW OF LAB RESULTS: NORMAL
LDLC SERPL CALC-MCNC: 79 MG/DL (ref 0–99)
LYMPHOCYTES # BLD AUTO: 1.3 X10E3/UL (ref 0.7–3.1)
LYMPHOCYTES NFR BLD AUTO: 10 %
MCH RBC QN AUTO: 34.9 PG (ref 26.6–33)
MCHC RBC AUTO-ENTMCNC: 36.1 G/DL (ref 31.5–35.7)
MCV RBC AUTO: 97 FL (ref 79–97)
MICROALBUMIN UR-MCNC: 470.4 UG/ML
MONOCYTES # BLD AUTO: 1 X10E3/UL (ref 0.1–0.9)
MONOCYTES NFR BLD AUTO: 7 %
NEUTROPHILS # BLD AUTO: 11 X10E3/UL (ref 1.4–7)
NEUTROPHILS NFR BLD AUTO: 83 %
PLATELET # BLD AUTO: 303 X10E3/UL (ref 150–450)
POTASSIUM SERPL-SCNC: 4.2 MMOL/L (ref 3.5–5.2)
PROT SERPL-MCNC: 7.1 G/DL (ref 6–8.5)
RBC # BLD AUTO: 4.44 X10E6/UL (ref 3.77–5.28)
SODIUM SERPL-SCNC: 130 MMOL/L (ref 134–144)
TRIGL SERPL-MCNC: 91 MG/DL (ref 0–149)
VLDLC SERPL CALC-MCNC: 16 MG/DL (ref 5–40)
WBC # BLD AUTO: 13.5 X10E3/UL (ref 3.4–10.8)

## 2021-11-23 RX ORDER — AMMONIUM LACTATE 12 G/100G
LOTION TOPICAL
Qty: 400 ML | Refills: 2 | Status: SHIPPED | OUTPATIENT
Start: 2021-11-23 | End: 2022-10-20 | Stop reason: SDUPTHER

## 2021-11-30 NOTE — PROGRESS NOTES
Past persistent elevation of WBC count, neutrophil and monocyte. Please refer patient to oncologist Dr. Earlene Rocha. Sodium level has improved. But still low. Probably because of all psychotropic medication. Patient need to address it with her psychiatrist.  Abel Arroyo, much carbohydrate and exercise. Low vitamin D, take vitamin D 1000 units once a day for 4 months.

## 2021-12-16 ENCOUNTER — TELEPHONE (OUTPATIENT)
Dept: INTERNAL MEDICINE CLINIC | Age: 66
End: 2021-12-16

## 2021-12-16 NOTE — TELEPHONE ENCOUNTER
Call placed to pt, We received her pre-op forms , Per the fax pts pre op visit must not be more than 30 days from the date of surgery and the forms states pts pre-op needs to be completed between 1/1/22 and 1/17/22.  Left VM for pt to call back

## 2022-01-17 ENCOUNTER — OFFICE VISIT (OUTPATIENT)
Dept: INTERNAL MEDICINE CLINIC | Age: 67
End: 2022-01-17
Payer: MEDICARE

## 2022-01-17 VITALS
HEART RATE: 70 BPM | DIASTOLIC BLOOD PRESSURE: 76 MMHG | RESPIRATION RATE: 16 BRPM | BODY MASS INDEX: 25.61 KG/M2 | TEMPERATURE: 98.7 F | OXYGEN SATURATION: 96 % | WEIGHT: 150 LBS | SYSTOLIC BLOOD PRESSURE: 130 MMHG | HEIGHT: 64 IN

## 2022-01-17 DIAGNOSIS — H26.9 CATARACT OF BOTH EYES, UNSPECIFIED CATARACT TYPE: ICD-10-CM

## 2022-01-17 DIAGNOSIS — J44.9 CHRONIC OBSTRUCTIVE PULMONARY DISEASE, UNSPECIFIED COPD TYPE (HCC): ICD-10-CM

## 2022-01-17 DIAGNOSIS — F31.77 BIPOLAR DISORDER, IN PARTIAL REMISSION, MOST RECENT EPISODE MIXED (HCC): ICD-10-CM

## 2022-01-17 DIAGNOSIS — Z01.818 PRE-OP EVALUATION: Primary | ICD-10-CM

## 2022-01-17 DIAGNOSIS — E78.00 PURE HYPERCHOLESTEROLEMIA: ICD-10-CM

## 2022-01-17 DIAGNOSIS — E11.65 UNCONTROLLED TYPE 2 DIABETES MELLITUS WITH HYPERGLYCEMIA (HCC): ICD-10-CM

## 2022-01-17 DIAGNOSIS — F33.1 MAJOR DEPRESSIVE DISORDER, RECURRENT, MODERATE (HCC): ICD-10-CM

## 2022-01-17 DIAGNOSIS — F17.200 SMOKING: ICD-10-CM

## 2022-01-17 PROBLEM — F33.9 MAJOR DEPRESSIVE DISORDER, RECURRENT, UNSPECIFIED (HCC): Status: ACTIVE | Noted: 2022-01-17

## 2022-01-17 PROBLEM — F33.0 MAJOR DEPRESSIVE DISORDER, RECURRENT, MILD (HCC): Status: ACTIVE | Noted: 2022-01-17

## 2022-01-17 PROCEDURE — 99214 OFFICE O/P EST MOD 30 MIN: CPT | Performed by: INTERNAL MEDICINE

## 2022-01-17 RX ORDER — FLUOXETINE HYDROCHLORIDE 20 MG/1
CAPSULE ORAL
Qty: 90 CAPSULE | Refills: 1 | Status: SHIPPED | OUTPATIENT
Start: 2022-01-17 | End: 2022-06-20

## 2022-01-17 RX ORDER — BUPROPION HYDROCHLORIDE 150 MG/1
TABLET ORAL
Qty: 90 TABLET | Refills: 1 | Status: SHIPPED | OUTPATIENT
Start: 2022-01-17 | End: 2022-07-27

## 2022-01-17 NOTE — LETTER
1/17/2022 3:27 PM    Ms. Keo Deluca  [address] [place] [address]  [place] [address]      Current Outpatient Medications   Medication Sig    FLUoxetine (PROzac) 20 mg capsule TAKE 1 TABLET BY MOUTH EVERY MORNING.  buPROPion XL (WELLBUTRIN XL) 150 mg tablet TAKE 1 TABLET BY MOUTH EVERY DAY IN THE MORNING    ammonium lactate (LAC-HYDRIN) 12 % lotion rub in to affected area well    losartan (COZAAR) 100 mg tablet Take 1 Tablet by mouth daily.  simvastatin (ZOCOR) 20 mg tablet TAKE 1 TABLET BY MOUTH EVERY DAY AT NIGHT    carbamide peroxide (DEBROX) 6.5 % otic solution Administer 5 Drops into each ear two (2) times a day.  glucose blood VI test strips (OneTouch Ultra Test) strip USE TO CHECK BLOOD SUGAR ONCE DAILY    amLODIPine (NORVASC) 10 mg tablet Take 1 Tablet by mouth daily.  umeclidinium-vilanteroL (ANORO ELLIPTA) 62.5-25 mcg/actuation inhaler Take 1 Puff by inhalation daily. DC spiriva    metFORMIN (GLUCOPHAGE) 500 mg tablet Take 1 Tablet by mouth two (2) times daily (with meals).  metoprolol tartrate (LOPRESSOR) 50 mg tablet TAKE 1 TABLET BY MOUTH EVERY DAY    lancets misc Check blood sugar one time a day    fluticasone propionate (FLONASE) 50 mcg/actuation nasal spray 2 Sprays by Both Nostrils route daily.  Nebulizer & Compressor machine 1 Each by Does Not Apply route every four (4) hours as needed for Wheezing. As directed    albuterol (ACCUNEB) 1.25 mg/3 mL nebu Take 3 mL by inhalation every four (4) hours as needed for Wheezing (wheezing).  albuterol (PROVENTIL HFA, VENTOLIN HFA, PROAIR HFA) 90 mcg/actuation inhaler Take 2 Puffs by inhalation every four (4) hours as needed for Wheezing.  guaiFENesin 200 mg/5 mL liqd Take 10 mL by mouth four (4) times daily as needed for Cough.  ergocalciferol (ERGOCALCIFEROL) 1,250 mcg (50,000 unit) capsule Take 1 Cap by mouth every seven (7) days.     calcium-vitamin D (OYSTER SHELL) 500 mg(1,250mg) -200 unit per tablet Take 1 Tab by mouth two (2) times daily (with meals).  OXcarbazepine (TRILEPTAL) 300 mg tablet TAKE 2 TABLETS BY MOUTH AT BEDTIME    diclofenac (VOLTAREN) 1 % gel APPLY TO AFFECTED AREA TWO (2) TIMES DAILY AS NEEDED.  polyethylene glycol (MIRALAX) 17 gram/dose powder TAKE 17 G BY MOUTH DAILY.  SENNA PLUS 8.6-50 mg per tablet TAKE 1 TABLET EVERY DAY    ONETOUCH ULTRA TEST strip CHECK BLOOD SUGAR DAILY    cholecalciferol, vitamin D3, (VITAMIN D3) 2,000 unit tab Take  by mouth.  famotidine (PEPCID) 20 mg tablet Take 1 Tab by mouth nightly.  Blood-Glucose Meter monitoring kit Check sugar QD    aspirin delayed-release 81 mg tablet Take  by mouth daily. No current facility-administered medications for this visit. Social History     Substance and Sexual Activity   Alcohol Use Yes    Comment: 1 beer per week.            Sincerely,

## 2022-01-17 NOTE — PROGRESS NOTES
Health Maintenance Due   Topic Date Due    COVID-19 Vaccine (1) Never done    DTaP/Tdap/Td series (1 - Tdap) Never done    Shingrix Vaccine Age 50> (1 of 2) Never done    Low dose CT lung screening  Never done    Pneumococcal 65+ years (2 of 2 - PPSV23) 07/07/2018    Breast Cancer Screen Mammogram  08/07/2020    Eye Exam Retinal or Dilated  03/08/2021    Flu Vaccine (1) Never done       Chief Complaint   Patient presents with    Pre-op Exam       1. Have you been to the ER, urgent care clinic since your last visit? Hospitalized since your last visit? No    2. Have you seen or consulted any other health care providers outside of the [address] [place] [address] since your last visit? Include any pap smears or colon screening. No    3) Do you have an Advance Directive on file? no    4) Are you interested in receiving information on Advance Directives? NO      Patient is accompanied by self I have received verbal consent from Vale aSles to discuss any/all medical information while they are present in the room.

## 2022-01-17 NOTE — PROGRESS NOTES
HISTORY OF PRESENT ILLNESS  Christopher Deleon is a 74 y.o. female. Patient was seen for a pre op exam. Is going to have cataracts surgery tomorrow on the right eye. Patient also reports that she was pulled over by the police for drinking. Reports that she often drinks 304 beers almost every other day. Did not know that she was intoxicated. Is asking for documentation of her COPD. directed family to my chart and medical records. When I called niece on the phone for clarification she was asking for refill on her antidepressants. Has been without for months. As her psych MD is asking for a follow up. No recent falls. Reviewed allergies. Reports no anesthesia concerns. Is a current smoker.    Visit Vitals  /76 (BP 1 Location: Left upper arm, BP Patient Position: Sitting, BP Cuff Size: Adult)   Pulse 70   Temp 98.7 °F (37.1 °C) (Oral)   Resp 16   Ht 5' 4\" (1.626 m)   Wt 150 lb (68 kg)   SpO2 96%   BMI 25.75 kg/m²     Past Medical History:   Diagnosis Date    COPD     early stage of emphysema    Depression     bipolar disease    Diabetes ([address]     Diabetes ([address]     for 15 years    Hypercholesterolemia     Hypertension     LBBB (left bundle branch block)     Psychiatric disorder     BIpolar    Renal cyst, right     left kidney in pelvis    Thyroid disease     thyroid nodule await for biopsy     Past Surgical History:   Procedure Laterality Date    HX BREAST BIOPSY Right     cyst removed at age 22   [facility] [address]      breast cyst removed from left breast     Family History   Problem Relation Age of Onset    Diabetes Mother     Hypertension Mother    [facility] Elevated Lipids Mother     Cancer Mother         breast ac    Breast Cancer Mother     Cancer Father         tongue ca    Diabetes Sister     Breast Cancer Sister     Heart Disease Brother      Outpatient Encounter Medications as of 1/17/2022   Medication Sig Dispense Refill    FLUoxetine (PROzac) 20 mg capsule TAKE 1 TABLET BY MOUTH EVERY MORNING. 90 Capsule 1    buPROPion XL (WELLBUTRIN XL) 150 mg tablet TAKE 1 TABLET BY MOUTH EVERY DAY IN THE MORNING 90 Tablet 1    ammonium lactate (LAC-HYDRIN) 12 % lotion rub in to affected area well 400 mL 2    losartan (COZAAR) 100 mg tablet Take 1 Tablet by mouth daily. 90 Tablet 1    simvastatin (ZOCOR) 20 mg tablet TAKE 1 TABLET BY MOUTH EVERY DAY AT NIGHT 90 Tablet 1    carbamide peroxide (DEBROX) 6.5 % otic solution Administer 5 Drops into each ear two (2) times a day. 30 mL 0    glucose blood VI test strips (OneTouch Ultra Test) strip USE TO CHECK BLOOD SUGAR ONCE DAILY 100 Strip 3    amLODIPine (NORVASC) 10 mg tablet Take 1 Tablet by mouth daily. 90 Tablet 1    umeclidinium-vilanteroL (ANORO ELLIPTA) 62.5-25 mcg/actuation inhaler Take 1 Puff by inhalation daily. DC spiriva 1 Inhaler 1    metFORMIN (GLUCOPHAGE) 500 mg tablet Take 1 Tablet by mouth two (2) times daily (with meals). 60 Tablet 1    metoprolol tartrate (LOPRESSOR) 50 mg tablet TAKE 1 TABLET BY MOUTH EVERY DAY 90 Tablet 1    lancets misc Check blood sugar one time a day 1 Package 11    fluticasone propionate (FLONASE) 50 mcg/actuation nasal spray 2 Sprays by Both Nostrils route daily. 1 Bottle 0    Nebulizer & Compressor machine 1 Each by Does Not Apply route every four (4) hours as needed for Wheezing. As directed 1 Each 0    albuterol (ACCUNEB) 1.25 mg/3 mL nebu Take 3 mL by inhalation every four (4) hours as needed for Wheezing (wheezing). 25 Each 0    albuterol (PROVENTIL HFA, VENTOLIN HFA, PROAIR HFA) 90 mcg/actuation inhaler Take 2 Puffs by inhalation every four (4) hours as needed for Wheezing. 1 Inhaler 0    guaiFENesin 200 mg/5 mL liqd Take 10 mL by mouth four (4) times daily as needed for Cough. 120 mL 0    ergocalciferol (ERGOCALCIFEROL) 1,250 mcg (50,000 unit) capsule Take 1 Cap by mouth every seven (7) days.  4 Cap 3    calcium-vitamin D (OYSTER SHELL) 500 mg(1,250mg) -200 unit per tablet Take 1 Tab by mouth two (2) times daily (with meals). 180 Tab 4    OXcarbazepine (TRILEPTAL) 300 mg tablet TAKE 2 TABLETS BY MOUTH AT BEDTIME  1    diclofenac (VOLTAREN) 1 % gel APPLY TO AFFECTED AREA TWO (2) TIMES DAILY AS NEEDED. 100 g 1    polyethylene glycol (MIRALAX) 17 gram/dose powder TAKE 17 G BY MOUTH DAILY. 1054 g 0    SENNA PLUS 8.6-50 mg per tablet TAKE 1 TABLET EVERY DAY 30 Tab 2    ONETOUCH ULTRA TEST strip CHECK BLOOD SUGAR DAILY 100 Strip 0    cholecalciferol, vitamin D3, (VITAMIN D3) 2,000 unit tab Take  by mouth.  famotidine (PEPCID) 20 mg tablet Take 1 Tab by mouth nightly. 30 Tab 0    Blood-Glucose Meter monitoring kit Check sugar QD 1 Kit 0    aspirin delayed-release 81 mg tablet Take  by mouth daily.  [DISCONTINUED] buPROPion XL (WELLBUTRIN XL) 150 mg tablet TAKE 1 TABLET BY MOUTH EVERY DAY IN THE MORNING 90 Tab 1    [DISCONTINUED] FLUoxetine (PROZAC) 20 mg capsule TAKE 1 TABLET BY MOUTH EVERY MORNING. 90 Cap 1     No facility-administered encounter medications on file as of 1/17/2022. HPI    Review of Systems   Constitutional: Negative. Eyes: Positive for blurred vision. Respiratory: Negative. Cardiovascular: Negative. Gastrointestinal: Negative. Neurological: Negative. Psychiatric/Behavioral: Positive for memory loss. Physical Exam  Vitals and nursing note reviewed. Cardiovascular:      Rate and Rhythm: Normal rate and regular rhythm. Pulmonary:      Effort: Pulmonary effort is normal.      Breath sounds: Normal breath sounds. Abdominal:      Palpations: Abdomen is soft. Musculoskeletal:         General: Normal range of motion. Skin:     General: Skin is warm. Neurological:      Mental Status: She is alert and oriented to person, place, and time. Psychiatric:         Behavior: Behavior normal.         ASSESSMENT and PLAN  Diagnoses and all orders for this visit:    1. Pre-op evaluation       -      Patient is cleared for surgery   2. Chronic obstructive pulmonary disease, unspecified COPD type ([facility]    3. Uncontrolled type 2 diabetes mellitus with hyperglycemia ([facility]    4. Pure hypercholesterolemia    5. Cataract of both eyes, unspecified cataract type    6. Bipolar disorder, in partial remission, most recent episode mixed (HCC)  -     buPROPion XL (WELLBUTRIN XL) 150 mg tablet; TAKE 1 TABLET BY MOUTH EVERY DAY IN THE MORNING    7. Major depressive disorder, recurrent, moderate    8. Smoking    Other orders  -     FLUoxetine (PROzac) 20 mg capsule; TAKE 1 TABLET BY MOUTH EVERY MORNING.       Follow-up and Dispositions    · Return if symptoms worsen or fail to improve.       lab results and schedule of future lab studies reviewed with patient  reviewed diet, exercise and weight control  reviewed medications and side effects in detail

## 2022-02-01 DIAGNOSIS — E11.9 TYPE 2 DIABETES MELLITUS WITHOUT COMPLICATION, WITHOUT LONG-TERM CURRENT USE OF INSULIN (HCC): ICD-10-CM

## 2022-02-01 RX ORDER — METFORMIN HYDROCHLORIDE 500 MG/1
500 TABLET ORAL 2 TIMES DAILY WITH MEALS
Qty: 60 TABLET | Refills: 1 | Status: SHIPPED | OUTPATIENT
Start: 2022-02-01 | End: 2022-03-02 | Stop reason: SDUPTHER

## 2022-03-01 DIAGNOSIS — I10 ESSENTIAL HYPERTENSION: ICD-10-CM

## 2022-03-02 DIAGNOSIS — E11.9 TYPE 2 DIABETES MELLITUS WITHOUT COMPLICATION, WITHOUT LONG-TERM CURRENT USE OF INSULIN (HCC): ICD-10-CM

## 2022-03-02 RX ORDER — METOPROLOL TARTRATE 50 MG/1
TABLET ORAL
Qty: 90 TABLET | Refills: 1 | Status: SHIPPED | OUTPATIENT
Start: 2022-03-02 | End: 2022-10-20 | Stop reason: SDUPTHER

## 2022-03-02 RX ORDER — METFORMIN HYDROCHLORIDE 500 MG/1
500 TABLET ORAL 2 TIMES DAILY WITH MEALS
Qty: 60 TABLET | Refills: 1 | Status: SHIPPED | OUTPATIENT
Start: 2022-03-02 | End: 2022-03-04

## 2022-03-16 RX ORDER — OXCARBAZEPINE 300 MG/1
TABLET, FILM COATED ORAL
Qty: 60 TABLET | OUTPATIENT
Start: 2022-03-16

## 2022-03-18 PROBLEM — E11.22 TYPE 2 DIABETES MELLITUS WITH CHRONIC KIDNEY DISEASE (HCC): Status: ACTIVE | Noted: 2021-11-22

## 2022-03-19 PROBLEM — F33.9 MAJOR DEPRESSIVE DISORDER, RECURRENT, UNSPECIFIED (HCC): Status: ACTIVE | Noted: 2022-01-17

## 2022-03-19 PROBLEM — E87.1 HYPONATREMIA: Status: ACTIVE | Noted: 2018-03-07

## 2022-03-19 PROBLEM — F33.0 MAJOR DEPRESSIVE DISORDER, RECURRENT, MILD (HCC): Status: ACTIVE | Noted: 2022-01-17

## 2022-03-19 PROBLEM — F33.1 MAJOR DEPRESSIVE DISORDER, RECURRENT, MODERATE (HCC): Status: ACTIVE | Noted: 2022-01-17

## 2022-03-19 PROBLEM — Z53.20 COLONOSCOPY REFUSED: Status: ACTIVE | Noted: 2017-01-19

## 2022-03-19 PROBLEM — Z28.21 REFUSED PNEUMOCOCCAL VACCINE: Status: ACTIVE | Noted: 2018-08-13

## 2022-03-19 PROBLEM — I44.7 LBBB (LEFT BUNDLE BRANCH BLOCK): Status: ACTIVE | Noted: 2018-03-22

## 2022-03-21 RX ORDER — OXCARBAZEPINE 300 MG/1
TABLET, FILM COATED ORAL
Qty: 60 TABLET | Refills: 1 | Status: SHIPPED | OUTPATIENT
Start: 2022-03-21 | End: 2022-04-12

## 2022-03-21 NOTE — TELEPHONE ENCOUNTER
Pt thinks that Grace prescribed this med last because she needed it.  Pt was unaware of her appointment today with Dr Margo Hoyt and was rescheduled for the next opening on 4/20/22

## 2022-04-07 ENCOUNTER — TELEPHONE (OUTPATIENT)
Dept: ONCOLOGY | Age: 67
End: 2022-04-07

## 2022-04-12 ENCOUNTER — OFFICE VISIT (OUTPATIENT)
Dept: ONCOLOGY | Age: 67
End: 2022-04-12
Payer: MEDICARE

## 2022-04-12 VITALS
HEART RATE: 72 BPM | RESPIRATION RATE: 18 BRPM | OXYGEN SATURATION: 95 % | WEIGHT: 156 LBS | TEMPERATURE: 97.8 F | DIASTOLIC BLOOD PRESSURE: 84 MMHG | SYSTOLIC BLOOD PRESSURE: 158 MMHG | BODY MASS INDEX: 26.78 KG/M2

## 2022-04-12 DIAGNOSIS — D72.821 MONOCYTOSIS: Primary | ICD-10-CM

## 2022-04-12 DIAGNOSIS — F33.0 MAJOR DEPRESSIVE DISORDER, RECURRENT, MILD (HCC): ICD-10-CM

## 2022-04-12 DIAGNOSIS — E11.9 TYPE 2 DIABETES MELLITUS WITHOUT COMPLICATION, WITHOUT LONG-TERM CURRENT USE OF INSULIN (HCC): ICD-10-CM

## 2022-04-12 PROCEDURE — G8753 SYS BP > OR = 140: HCPCS | Performed by: INTERNAL MEDICINE

## 2022-04-12 PROCEDURE — 3046F HEMOGLOBIN A1C LEVEL >9.0%: CPT | Performed by: INTERNAL MEDICINE

## 2022-04-12 PROCEDURE — G8536 NO DOC ELDER MAL SCRN: HCPCS | Performed by: INTERNAL MEDICINE

## 2022-04-12 PROCEDURE — 99204 OFFICE O/P NEW MOD 45 MIN: CPT | Performed by: INTERNAL MEDICINE

## 2022-04-12 PROCEDURE — 3017F COLORECTAL CA SCREEN DOC REV: CPT | Performed by: INTERNAL MEDICINE

## 2022-04-12 PROCEDURE — G9717 DOC PT DX DEP/BP F/U NT REQ: HCPCS | Performed by: INTERNAL MEDICINE

## 2022-04-12 PROCEDURE — 1090F PRES/ABSN URINE INCON ASSESS: CPT | Performed by: INTERNAL MEDICINE

## 2022-04-12 PROCEDURE — 2022F DILAT RTA XM EVC RTNOPTHY: CPT | Performed by: INTERNAL MEDICINE

## 2022-04-12 PROCEDURE — G8399 PT W/DXA RESULTS DOCUMENT: HCPCS | Performed by: INTERNAL MEDICINE

## 2022-04-12 PROCEDURE — G8419 CALC BMI OUT NRM PARAM NOF/U: HCPCS | Performed by: INTERNAL MEDICINE

## 2022-04-12 PROCEDURE — G8754 DIAS BP LESS 90: HCPCS | Performed by: INTERNAL MEDICINE

## 2022-04-12 PROCEDURE — 1101F PT FALLS ASSESS-DOCD LE1/YR: CPT | Performed by: INTERNAL MEDICINE

## 2022-04-12 PROCEDURE — G8427 DOCREV CUR MEDS BY ELIG CLIN: HCPCS | Performed by: INTERNAL MEDICINE

## 2022-04-12 PROCEDURE — G9899 SCRN MAM PERF RSLTS DOC: HCPCS | Performed by: INTERNAL MEDICINE

## 2022-04-12 RX ORDER — OXCARBAZEPINE 300 MG/1
TABLET, FILM COATED ORAL
Qty: 60 TABLET | Refills: 1 | Status: SHIPPED | OUTPATIENT
Start: 2022-04-12 | End: 2022-05-10

## 2022-04-12 NOTE — PROGRESS NOTES
Soo Courser is a 74 y.o. female    Chief Complaint   Patient presents with    New Patient       1. Have you been to the ER, urgent care clinic since your last visit? Hospitalized since your last visit? No    2. Have you seen or consulted any other health care providers outside of the [address] [place] [address] since your last visit? Include any pap smears or colon screening.  No

## 2022-04-12 NOTE — PROGRESS NOTES
[facility] [place] [address] [address], [address]  W: [phone]  F: [phone]    Reason for Visit:   Bess Kelley is a 74 y.o. female who is seen in consultation at the request of Dr. Mendoza Brooke for evaluation of leucocytosis    History of Present Illness:   Patient is a 74 y.o. female with COPD, diabetes, bipolar disorder who is seen for persistent leukocytosis. She had a normal WBC count until 2/2020. She has had a slightly elevated WBCs 3/2020. Most recent WBC count was elevated at 13 K. Differential notable for neutrophilia with some monocytosis. Has no other CBC abnormalities. Denies any fevers, chills, personal history of DVT or stroke, no new rashes, bleeding, has lost some weight since she lost her sister and mother , abdominal pain. She is up-to-date with mammogram , has refused a colonoscopy , denies bleeding. She does smoke. She comes with her brother today. Past Medical History:   Diagnosis Date    COPD     early stage of emphysema    Depression     bipolar disease    Diabetes ([address]     Diabetes ([facility] [address]     for 15 years    Hypercholesterolemia     Hypertension     LBBB (left bundle branch block)     Psychiatric disorder     BIpolar    Renal cyst, right     left kidney in pelvis    Thyroid disease     thyroid nodule await for biopsy      Past Surgical History:   Procedure Laterality Date    HX BREAST BIOPSY Right     cyst removed at age 23    [address]      breast cyst removed from left breast      Social History     Tobacco Use    Smoking status: Current Every Day Smoker     Packs/day: 1.00     Years: 30.00     Pack years: 30.00     Types: Cigarettes    Smokeless tobacco: Never Used   Substance Use Topics    Alcohol use: Yes     Comment: 1 beer per week.       Family History   Problem Relation Age of Onset    Diabetes Mother     Hypertension Mother     Elevated Lipids Mother     Cancer Mother EMERGENCY DEPARTMENT HISTORY AND PHYSICAL EXAM      Date: 12/12/2017  Patient Name: Zahra Shay    History of Presenting Illness     Chief Complaint   Patient presents with    Drug Overdose     patient reports she took five 0.3mg clonidine, and four 3mg melatonin tabs in attempts to hurt herself, per mother poison control was called       History Provided By: Patient and Patient's Mother    HPI: Zahra Shay is a 10 y.o. female, pmhx Depression, anxiety, ADHD, PTSD, who presents ambulatory to the ED c/o drug overdose x 45 minutes ago. Pt reports taking x5 0.3 mg Clonidine and x4 3mg Melatonin tablets. She notes taking the pills to [facility] herself. \" Of note, the pt was seen in the ED for SI with a plan of overdosing on her Father's Clonidine pills on 12/4/2017. Mother states the pt followed up with her counselor and has another follow-up in 2 days in since noting her \"moods have intensified. \" She denies any recent medication changes. Pt specifically denies any fever, congestion, cough, shortness of breath, chest pain, abdominal pain, nausea, vomiting, diarrhea, dysuria, or urinary frequency. Given pt is currently without complaint there is no applicable location, quality, duration, severity, associated sxs, additional context, or modifying factors. PCP: Destini Talavera MD    PMHx: Significant for Depression, anxiety, ADHD, PTSD  PSHx: Significant for none  Social Hx: +tobacco, -EtOH, -Illicit Drugs     There are no other complaints, changes, or physical findings at this time. Current Outpatient Prescriptions   Medication Sig Dispense Refill    methylphenidate ER 36 mg 24 hr tab Take 36 mg by mouth every morning.  sertraline (ZOLOFT) 100 mg tablet Take 150 mg by mouth daily.  guanFACINE IR (TENEX) 2 mg IR tablet Take 2 mg by mouth two (2) times a day.  ARIPiprazole (ABILIFY) 5 mg tablet Take 5 mg by mouth daily.       cloNIDine HCl (CATAPRES) 0.2 mg tablet Take 0.3 mg by mouth two (2) breast ac    Breast Cancer Mother     Cancer Father         tongue ca    Diabetes Sister     Breast Cancer Sister     Heart Disease Brother      Current Outpatient Medications   Medication Sig    OXcarbazepine (TRILEPTAL) 300 mg tablet TAKE 2 TABLETS BY MOUTH AT BEDTIME    metFORMIN (GLUCOPHAGE) 500 mg tablet TAKE 1 TABLET BY MOUTH TWICE A DAY WITH MEALS    metoprolol tartrate (LOPRESSOR) 50 mg tablet TAKE 1 TABLET BY MOUTH EVERY DAY    FLUoxetine (PROzac) 20 mg capsule TAKE 1 TABLET BY MOUTH EVERY MORNING.  buPROPion XL (WELLBUTRIN XL) 150 mg tablet TAKE 1 TABLET BY MOUTH EVERY DAY IN THE MORNING    ammonium lactate (LAC-HYDRIN) 12 % lotion rub in to affected area well    losartan (COZAAR) 100 mg tablet Take 1 Tablet by mouth daily.  simvastatin (ZOCOR) 20 mg tablet TAKE 1 TABLET BY MOUTH EVERY DAY AT NIGHT    carbamide peroxide (DEBROX) 6.5 % otic solution Administer 5 Drops into each ear two (2) times a day.  glucose blood VI test strips (OneTouch Ultra Test) strip USE TO CHECK BLOOD SUGAR ONCE DAILY    amLODIPine (NORVASC) 10 mg tablet Take 1 Tablet by mouth daily.  umeclidinium-vilanteroL (ANORO ELLIPTA) 62.5-25 mcg/actuation inhaler Take 1 Puff by inhalation daily. DC spiriva    lancets misc Check blood sugar one time a day    fluticasone propionate (FLONASE) 50 mcg/actuation nasal spray 2 Sprays by Both Nostrils route daily.  Nebulizer & Compressor machine 1 Each by Does Not Apply route every four (4) hours as needed for Wheezing. As directed    albuterol (ACCUNEB) 1.25 mg/3 mL nebu Take 3 mL by inhalation every four (4) hours as needed for Wheezing (wheezing).  albuterol (PROVENTIL HFA, VENTOLIN HFA, PROAIR HFA) 90 mcg/actuation inhaler Take 2 Puffs by inhalation every four (4) hours as needed for Wheezing.  guaiFENesin 200 mg/5 mL liqd Take 10 mL by mouth four (4) times daily as needed for Cough.     ergocalciferol (ERGOCALCIFEROL) 1,250 mcg (50,000 unit) capsule times a day.  melatonin 3 mg tablet Take 3 mg by mouth. Past History     Past Medical History:  Past Medical History:   Diagnosis Date    Otitis media     ear infections as an infant    Psychiatric disorder     Depression, anxiety, ADHD, PTSD       Past Surgical History:  Past Surgical History:   Procedure Laterality Date    HX HEENT      ear tubes       Family History:  History reviewed. No pertinent family history. Social History:  Social History   Substance Use Topics    Smoking status: Current Every Day Smoker     Packs/day: 0.25    Smokeless tobacco: Former User    Alcohol use No       Allergies:  No Known Allergies      Review of Systems   Review of Systems   Constitutional: Negative. Negative for fever. Eyes: Negative. Respiratory: Negative. Negative for shortness of breath. Cardiovascular: Negative for chest pain. Gastrointestinal: Negative for abdominal pain, nausea and vomiting. Endocrine: Negative. Genitourinary: Negative. Negative for difficulty urinating, dysuria and hematuria. Musculoskeletal: Negative. Skin: Negative. Allergic/Immunologic: Negative. Neurological: Negative. Psychiatric/Behavioral: Positive for self-injury and suicidal ideas. All other systems reviewed and are negative. Physical Exam   Physical Exam   Constitutional: She is oriented to person, place, and time. She appears well-developed and well-nourished. No distress. HENT:   Head: Normocephalic and atraumatic. Nose: Nose normal.   Eyes: Conjunctivae and EOM are normal. No scleral icterus. Neck: Normal range of motion. No tracheal deviation present. Cardiovascular: Normal rate, regular rhythm, normal heart sounds and intact distal pulses. Exam reveals no friction rub. No murmur heard. Pulmonary/Chest: Effort normal and breath sounds normal. No stridor. No respiratory distress. She has no wheezes. She has no rales. Abdominal: Soft.  Bowel sounds are normal. She Take 1 Cap by mouth every seven (7) days.  calcium-vitamin D (OYSTER SHELL) 500 mg(1,250mg) -200 unit per tablet Take 1 Tab by mouth two (2) times daily (with meals).  diclofenac (VOLTAREN) 1 % gel APPLY TO AFFECTED AREA TWO (2) TIMES DAILY AS NEEDED.  polyethylene glycol (MIRALAX) 17 gram/dose powder TAKE 17 G BY MOUTH DAILY.  SENNA PLUS 8.6-50 mg per tablet TAKE 1 TABLET EVERY DAY    ONETOUCH ULTRA TEST strip CHECK BLOOD SUGAR DAILY    cholecalciferol, vitamin D3, (VITAMIN D3) 2,000 unit tab Take  by mouth.  famotidine (PEPCID) 20 mg tablet Take 1 Tab by mouth nightly.  Blood-Glucose Meter monitoring kit Check sugar QD    aspirin delayed-release 81 mg tablet Take  by mouth daily. No current facility-administered medications for this visit. Allergies   Allergen Reactions    Contrast Agent [Iodine] Anaphylaxis    Pcn [Penicillins] Rash    Pcn [Penicillins] Hives        Review of Systems: A complete review of systems was obtained, negative except as described above. Physical Exam:     Visit Vitals  Resp 18   Wt 156 lb (70.8 kg)   BMI 26.78 kg/m²     ECOG PS: 1  General: No distress  Eyes: PERRLA, anicteric sclerae  HENT: Atraumatic  Neck: Supple  Lymphatic: No cervical, supraclavicular, or inguinal adenopathy  Respiratory: normal respiratory effort  GI: Soft, nontender, nondistended, no masses, no hepatomegaly, no splenomegaly  MS: Normal gait and station. Digits without clubbing or cyanosis. Skin: No rashes, ecchymoses, or petechiae. Normal temperature, turgor, and texture. Psych: Alert, oriented, appropriate affect, normal judgment/insight    Results:     Lab Results   Component Value Date/Time    WBC 13.5 (H) 11/22/2021 03:16 PM    HGB 15.5 11/22/2021 03:16 PM    HCT 42.9 11/22/2021 03:16 PM    PLATELET 287 93/64/0404 03:16 PM    MCV 97 11/22/2021 03:16 PM    ABS.  NEUTROPHILS 11.0 (H) 11/22/2021 03:16 PM    Hematocrit (POC) 50 (H) 10/26/2020 06:15 PM     Lab Results exhibits no distension. There is no tenderness. There is no rebound. Musculoskeletal: Normal range of motion. She exhibits no tenderness. Neurological: She is alert and oriented to person, place, and time. No cranial nerve deficit. Skin: Skin is warm and dry. No rash noted. She is not diaphoretic. Psychiatric: Her speech is normal and behavior is normal. Judgment normal. Cognition and memory are normal. She exhibits a depressed mood. She expresses suicidal ideation. Nursing note and vitals reviewed. Diagnostic Study Results     Labs -     No results found for this or any previous visit (from the past 12 hour(s)). Medical Decision Making   I am the first provider for this patient. I reviewed the vital signs, available nursing notes, past medical history, past surgical history, family history and social history. Vital Signs-Reviewed the patient's vital signs.   Patient Vitals for the past 12 hrs:   Pulse Resp BP SpO2   12/13/17 1315 52 17 101/55 97 %   12/13/17 1045 61 17 99/42 97 %   12/13/17 1029 53 14 107/44 96 %   12/13/17 0930 65 19 101/55 96 %   12/13/17 0915 66 19 103/43 98 %   12/13/17 0900 78 21 98/48 96 %   12/13/17 0845 63 21 98/40 98 %   12/13/17 0830 66 16 99/36 98 %   12/13/17 0815 68 16 96/48 96 %   12/13/17 0800 47 16 103/43 98 %   12/13/17 0745 73 20 105/49 (!) 74 %   12/13/17 0737 51 16 92/44 99 %   12/13/17 0730 54 18 92/44 97 %   12/13/17 0715 55 20 99/41 99 %   12/13/17 0700 52 22 97/44 97 %   12/13/17 0645 51 13 99/44 97 %   12/13/17 0630 49 16 97/43 97 %   12/13/17 0615 49 16 96/41 98 %   12/13/17 0538 57 20 98/39 97 %   12/13/17 0532 79 21 89/46 96 %   12/13/17 0515 47 16 90/32 96 %   12/13/17 0500 46 14 97/37 95 %   12/13/17 0445 47 20 91/31 97 %   12/13/17 0430 48 19 93/33 97 %   12/13/17 0415 48 19 90/33 96 %   12/13/17 0400 48 18 92/33 96 %   12/13/17 0345 51 20 105/41 97 %   12/13/17 0330 66 17 103/42 98 %   12/13/17 0322 - - 101/41 -   12/13/17 0315 51 21 101/40 98 %   12/13/17 0300 54 21 98/36 97 %   12/13/17 0230 50 21 102/38 97 %   12/13/17 0215 64 19 101/47 98 %   12/13/17 0200 59 20 100/46 98 %   12/13/17 0145 55 21 96/42 98 %       Pulse Oximetry Analysis - 98% on RA    Cardiac Monitor:   Rate: 66 bpm  Rhythm: Normal Sinus Rhythm      Records Reviewed: Nursing Notes and Old Medical Records    Provider Notes (Medical Decision Making):     DDX:  SI, depression, intentional overdose, cardiac arrhythmia    Plan:  Ekg, labs, ivf, med clearance labs, cardiac monitor, poison control consult    Impression:  Suicidal gesture/intent, intential overdose of clonidine    ED Course:   Initial assessment performed. The patients presenting problems have been discussed, and they are in agreement with the care plan formulated and outlined with them. I have encouraged them to ask questions as they arise throughout their visit. I reviewed our electronic medical record system for any past medical records that were available that may contribute to the patients current condition, the nursing notes and and vital signs from today's visit    Nursing notes will be reviewed as they become available in realtime while the pt has been in the ED. Deanna Torres MD    I have spent 3-7 minutes discussing the medical risks of prolonged smoking habits and advised the patient of the benefits of the cessation of smoking, providing specific suggestions on how to quit. Deanna Torres MD    EKG interpretation 2130: NSR, nl Axis, rate 65; , QRS 78, QTc 413; no acute ischemia; Deanna Torres MD      PROGRESS NOTE:  11:38 PM  Pt reevaluated. Per Poison control, observe for 4 hours after ingestion. Pt can be discharged if vitals are stable after 4 hours.    Written by Rasheeda Singh ED Scribe, as dictated by Deanna Torres MD    PROGRESS NOTE:  11:43 PM  Pt noted to be sleeping comfortably, received 1L IVF, BP stable, HR at baseline (when compared to last weeks eval) Left voicemail with [facility] Component Value Date/Time    Sodium 130 (L) 11/22/2021 03:16 PM    Potassium 4.2 11/22/2021 03:16 PM    Chloride 94 (L) 11/22/2021 03:16 PM    CO2 19 (L) 11/22/2021 03:16 PM    Glucose 117 (H) 11/22/2021 03:16 PM    BUN 5 (L) 11/22/2021 03:16 PM    Creatinine 0.57 11/22/2021 03:16 PM    GFR est  11/22/2021 03:16 PM    GFR est non-AA 96 11/22/2021 03:16 PM    Calcium 10.1 11/22/2021 03:16 PM    Sodium (POC) 128 (L) 10/26/2020 06:15 PM    Potassium (POC) 3.9 10/26/2020 06:15 PM    Chloride (POC) 96 (L) 10/26/2020 06:15 PM    Glucose (POC) 122 (H) 10/26/2020 06:15 PM    Glucose (POC) 141 (H) 10/26/2020 03:25 PM    BUN (POC) <4 (L) 10/26/2020 06:15 PM    Creatinine (POC) 0.7 10/26/2020 06:15 PM    Calcium, ionized (POC) 1.03 (L) 10/26/2020 06:15 PM     Lab Results   Component Value Date/Time    Bilirubin, total 0.2 11/22/2021 03:16 PM    ALT (SGPT) 8 11/22/2021 03:16 PM    Alk. phosphatase 109 11/22/2021 03:16 PM    Protein, total 7.1 11/22/2021 03:16 PM    Albumin 4.3 11/22/2021 03:16 PM    Globulin 3.9 10/26/2020 03:27 PM       Lab Results   Component Value Date/Time    TSH 2.060 08/07/2019 04:09 PM    Lipase 115 03/08/2020 07:18 PM    Hep C Virus Ab <0.1 10/25/2017 01:52 PM       No results found for: INR, APTT, DDIMSQ, DDIME, [id number], [id number], Nevaeh Mireles, FDPLT, Rafaela Smalls, [address] [place] [address] Rd, [address], Cheryn Ebbing, [address] [facility] [address], [address], [id number], [id number], [id number], [id number], [id number], [id number], INREXT    Records reviewed and summarized above. Pathology report(s) reviewed above. Radiology report(s) reviewed above.     CT chest 3/2020  Unremarkable       Assessment:   1) Neutrophilic leucocytosis  Mild persistent since 2020  Likely reactive to smoking  Uptodate with age appropriate Ca screening  Consider low dose CT chest annually  Given high normal Hb will get a JAK2 tested but my suspicion for MPN is low    2) Smoking    3) DM    4) COPD      Plan:     · Cbc diff, smear, JAK2, BACR ABL PCR in 4 weeks and rep for pt eval.     PROGRESS NOTE:  1:10 AM  Pt has been medically cleared. Will consult with [facility]. CONSULT NOTE:   1:10 AM  Michel Palacios MD spoke with Towana Goldberg,   Specialty: [facility] due to pt's drug overdose. Discussed pt's HPI and available diagnostic results thus far. Expressed concerns for Evaluation and likely placement in psych facility. Consultant will eval via tele-psych. Michel Palacios MD    PROGRESS NOTE:  2:11 AM  Towana Goldberg from [facility], reports that Dr. Radha Borrego is in agreement that the pt should be admitted. Working on bed placement. Pt and Mother in agreement with plan and express understanding. Written by Isabella Polanco ED Scribe, as dictated by Michel Palacios MD    PROGRESS NOTE:  5:49 AM  Mother declines placement at [facility] in [place], [place]  because she does not think she can drive at this time. Will continue to monitor the pt until placement. SIGN OUT:  6:45 AM  Patient's presentation, labs/imaging and plan of care was reviewed with Nixon Monsivais DO as part of sign out. They will continue to monitor until [facility] placement as part of the plan discussed with the patient. Nixon Monsivais DO's assistance in completion of this plan is greatly appreciated but it should be noted that I will be the provider of record for this patient. Michel Palacios MD    PROGRESS NOTE:  9:30 AM  Fouzia Perkins, recommends consulting Dr. Crystal Mccartney, to get her evaluation in process since pt is still waiting to be transferred. Written by Yu French ED Scribe, as dictated by Nixon Monsivais DO. PROGRESS NOTE:   12:20 PM  Fouzia Perknis, says she called more facilities for pt transfer and is waiting to hear back. Brenda Vences has updated Mom and Mom is in agreement with care plan. Still waiting on Dr. Griffin Barakat. Written by QUE Chambersibmadhuri, as dictated by Nixon Monsivais DO.     PROGRESS NOTE:   1:35 PM  Spoke with Brenda Vences she states pt has been accepted at [facility] see me VV in 6-8 weeks  · If labs unremarkable will not require additional follow up     I appreciate the opportunity to participate in MsTyra Laureano Agarwal's care.     Signed By: Pia Hinton MD [facility] and Dr. Claudine Burgos will accept the pt. Written by QUE Rodriguez, as dictated by Raul Kowalski DO. Disposition:  1:36 PM  Patient is being transferred to [facility]. The results of their tests and reasons for their transfer have been discussed with them and/or available family. They convey agreement and understanding for the need to be transferred. Consultation has been made with the inpatient physician specialist for transfer. PLAN:  1. The patient will be transferred for further evaluation. Diagnosis     Clinical Impression:   1. Suicidal overdose, initial encounter ([facility] [address]    2. Clonidine overdose, intentional self-harm, initial encounter [facility])        Attestations: This note is prepared by Luis Alberto Kim, acting as Scribe for MD Simon Puentes MD : The scribe's documentation has been prepared under my direction and personally reviewed by me in its entirety. I confirm that the note above accurately reflects all work, treatment, procedures, and medical decision making performed by me. This note will not be viewable in [address] MS3/4 Progress note, Discussed with Resident and Attending      CHIEF COMPLAINT & BRIEF HOSPITAL COURSE:  37 year old man with PMH of DM, ESRD on dialysis, Hypertension, COPD on home oxygen, Bipolar diease and schizophrenia, followed by Dr La closely sent in for AV fistula evaluation post HD 2/8/21 for suspected aneurysm. Patient has been complaining of pain at the AV fistula site for quite sometime and he is sent to hospital for fistula repair on 2/11 and requires medical optimization. He also reports a small scrotal bump/swelling for few days which is painful but not erythematous. He had an abscess drained in the past in same location. Scrotal US was done and shows testicular microlithiasis. He will be seen by cardiology and pulmonology today for medical optimization for surgery.    INTERVAL HPI/OVERNIGHT EVENTS: Pt seen and examined at bedside, he reports pain is better in the AV fistula site. He states the medication is helping. Fistula has good thrill with no erythema or discharge. Scrotal US was done and showed testicular microlithiasis, it was explained to the patient that this needs urologic follow up due to association with cancer. The scrotal nodule is likely ingrown hair or pimple and will continue to monitor, pt still complains of pain. The patient states LLQ abdominal pain has resolved. The patient also complains of itchy rash on the LUE and RLE, located specifically where he has red dye from his tattoos. Pt states all tattoos were done by the same person and he has not had a reaction like this since he got them about 6 yrs ago.     REVIEW OF SYSTEMS:  CONSTITUTIONAL: No fever, weight loss, or fatigue  RESPIRATORY: No cough, wheezing, chills or hemoptysis; No shortness of breath  CARDIOVASCULAR: No chest pain, palpitations, dizziness, or leg swelling  GASTROINTESTINAL:Mild intermittent abdominal pain. No nausea, vomiting, or hematemesis; No diarrhea or constipation. No melena or hematochezia.  GENITOURINARY: tender nodule on R testicular area.  MUSCULOSKELETAL: Pain on the left arm at proximal aspect of AV fistula  NEUROLOGICAL: No headaches, memory loss, loss of strength, numbness, or tremors  SKIN: pruritic rash located on RLE and LUE, pattern consistent with red tattoo dye.    MEDICATIONS  (STANDING):  atorvastatin 40 milliGRAM(s) Oral at bedtime  dextrose 50% Injectable 25 Gram(s) IV Push once  folic acid 1 milliGRAM(s) Oral daily  gabapentin 300 milliGRAM(s) Oral at bedtime  glucagon  Injectable 1 milliGRAM(s) IntraMuscular once  heparin   Injectable 5000 Unit(s) SubCutaneous every 8 hours  hydrocortisone 1% Cream 1 Application(s) Topical two times a day  insulin lispro (ADMELOG) corrective regimen sliding scale   SubCutaneous three times a day before meals  metoprolol tartrate 50 milliGRAM(s) Oral two times a day  mirtazapine 15 milliGRAM(s) Oral at bedtime  NIFEdipine XL 30 milliGRAM(s) Oral daily  QUEtiapine 50 milliGRAM(s) Oral at bedtime  risperiDONE   Tablet 2 milliGRAM(s) Oral daily  senna 2 Tablet(s) Oral at bedtime  sevelamer carbonate 800 milliGRAM(s) Oral three times a day with meals  trimethoprim  160 mG/sulfamethoxazole 800 mG 0.5 Tablet(s) Oral <User Schedule>    MEDICATIONS  (PRN):  acetaminophen   Tablet .. 650 milliGRAM(s) Oral every 6 hours PRN Temp greater or equal to 38C (100.4F), Mild Pain (1 - 3)  ALBUTerol    90 MICROgram(s) HFA Inhaler 2 Puff(s) Inhalation every 6 hours PRN Shortness of Breath and/or Wheezing  HYDROmorphone   Tablet 1 milliGRAM(s) Oral every 6 hours PRN Severe Pain (7 - 10)  oxycodone    5 mG/acetaminophen 325 mG 1 Tablet(s) Oral every 4 hours PRN Moderate Pain (4 - 6)      Vital Signs Last 24 Hrs  T(C): 36.9 (10 Feb 2021 04:52), Max: 36.9 (09 Feb 2021 21:51)  T(F): 98.5 (10 Feb 2021 04:52), Max: 98.5 (09 Feb 2021 21:51)  HR: 62 (10 Feb 2021 04:52) (62 - 78)  BP: 143/81 (10 Feb 2021 04:52) (143/81 - 157/88)  BP(mean): --  RR: 18 (10 Feb 2021 04:52) (18 - 18)  SpO2: 99% (10 Feb 2021 04:52) (95% - 99%)    PHYSICAL EXAMINATION:  GENERAL: NAD, obese male with NC in place at 3L  HEAD:  Atraumatic, Normocephalic  EYES:  conjunctiva and sclera clear, no scleral icterus  NECK: Supple, No JVD, Normal thyroid  CHEST/LUNG: Clear to auscultation.  No rales, rhonchi, wheezing, or rubs  HEART: Regular rate and rhythm; mild systolic murmur appreciated  ABDOMEN: Soft, Nontender, Nondistended; Bowel sounds present  NERVOUS SYSTEM:  Alert & Oriented X3,  Strength 5/5 in upper and lower extremities, sensation intact  EXTREMITIES: AV fistula on LUE with good palpable thrill and audible bruit. no dishcarge or erythema. 2+ Peripheral Pulses, No clubbing, cyanosis, or edema  SKIN: warm dry, multiple scars on AV fistula site. Pruritic rash located on LUE and RLE in location of red tattoo dye.                           11.0   6.33  )-----------( 196      ( 09 Feb 2021 07:37 )             36.7     02-09    133<L>  |  98  |  35<H>  ----------------------------<  98  4.7   |  28  |  7.03<H>    Ca    9.6      09 Feb 2021 07:37  Phos  6.9     02-09  Mg     2.3     02-09    TPro  7.4  /  Alb  3.1<L>  /  TBili  0.4  /  DBili  x   /  AST  11  /  ALT  18  /  AlkPhos  130<H>  02-09    LIVER FUNCTIONS - ( 09 Feb 2021 07:37 )  Alb: 3.1 g/dL / Pro: 7.4 g/dL / ALK PHOS: 130 U/L / ALT: 18 U/L DA / AST: 11 U/L / GGT: x                 I&O's Summary    09 Feb 2021 07:01  -  10 Feb 2021 07:00  --------------------------------------------------------  IN: 236 mL / OUT: 0 mL / NET: 236 mL        Culture - Urine (collected 09 Feb 2021 02:58)  Source: .Urine Clean Catch (Midstream)  Final Report (09 Feb 2021 22:18):    <10,000 CFU/mL Normal Urogenital Tayler        RADIOLOGY & ADDITIONAL TESTS:                   MS3/4 Progress note, Discussed with Resident and Attending      CHIEF COMPLAINT & BRIEF HOSPITAL COURSE:  37 year old man with PMH of DM, ESRD on dialysis, Hypertension, COPD on home oxygen, Bipolar diease and schizophrenia, followed by Dr La closely sent in for AV fistula evaluation post HD 2/8/21 for suspected aneurysm. Patient has been complaining of pain at the AV fistula site for quite sometime and he is sent to hospital for fistula repair on 2/11 and requires medical optimization. He also reports a small scrotal bump/swelling for few days which is painful but not erythematous. He had an abscess drained in the past in same location. Scrotal US was done and shows testicular microlithiasis. He will be seen by cardiology and pulmonology today for medical optimization for surgery. Plan HD on 2/10 in afternoon prior to surgery and then on 2/12 prior to discharge    INTERVAL HPI/OVERNIGHT EVENTS:   Pt seen and examined at bedside, he reports pain is better in the AV fistula site. He states the medication is helping, got dilaudid 0.5mg oral last night. Fistula has good thrill with no erythema or discharge. Scrotal US was done and showed testicular microlithiasis, it was explained to the patient that this needs urologic follow up due to association with cancer. The scrotal nodule is likely ingrown hair or pimple and will continue to monitor, pt still complains of pain. The patient states LLQ abdominal pain has resolved. The patient also complains of itchy rash on the LUE and RLE, located specifically where he has red dye from his tattoos. Pt states all tattoos were done by the same person and he has not had a reaction like this since he got them about 6 yrs ago.     REVIEW OF SYSTEMS:  CONSTITUTIONAL: No fever, weight loss, or fatigue  RESPIRATORY: No cough, wheezing, chills or hemoptysis; No shortness of breath  CARDIOVASCULAR: No chest pain, palpitations, dizziness, or leg swelling  GASTROINTESTINAL:  No nausea, vomiting, or hematemesis; No diarrhea or constipation. No melena or hematochezia.  GENITOURINARY: tender nodule on R testicular area.  MUSCULOSKELETAL: Pain on the left arm at proximal aspect of AV fistula  NEUROLOGICAL: No headaches, memory loss, loss of strength, numbness, or tremors  SKIN: pruritic rash located on RLE and LUE, pattern consistent with red tattoo dye.    MEDICATIONS  (STANDING):  atorvastatin 40 milliGRAM(s) Oral at bedtime  dextrose 50% Injectable 25 Gram(s) IV Push once  folic acid 1 milliGRAM(s) Oral daily  gabapentin 300 milliGRAM(s) Oral at bedtime  glucagon  Injectable 1 milliGRAM(s) IntraMuscular once  heparin   Injectable 5000 Unit(s) SubCutaneous every 8 hours  hydrocortisone 1% Cream 1 Application(s) Topical two times a day  insulin lispro (ADMELOG) corrective regimen sliding scale   SubCutaneous three times a day before meals  metoprolol tartrate 50 milliGRAM(s) Oral two times a day  mirtazapine 15 milliGRAM(s) Oral at bedtime  NIFEdipine XL 30 milliGRAM(s) Oral daily  QUEtiapine 50 milliGRAM(s) Oral at bedtime  risperiDONE   Tablet 2 milliGRAM(s) Oral daily  senna 2 Tablet(s) Oral at bedtime  sevelamer carbonate 800 milliGRAM(s) Oral three times a day with meals  trimethoprim  160 mG/sulfamethoxazole 800 mG 0.5 Tablet(s) Oral <User Schedule>    MEDICATIONS  (PRN):  acetaminophen   Tablet .. 650 milliGRAM(s) Oral every 6 hours PRN Temp greater or equal to 38C (100.4F), Mild Pain (1 - 3)  ALBUTerol    90 MICROgram(s) HFA Inhaler 2 Puff(s) Inhalation every 6 hours PRN Shortness of Breath and/or Wheezing  HYDROmorphone   Tablet 1 milliGRAM(s) Oral every 6 hours PRN Severe Pain (7 - 10)  oxycodone    5 mG/acetaminophen 325 mG 1 Tablet(s) Oral every 4 hours PRN Moderate Pain (4 - 6)      Vital Signs Last 24 Hrs  T(C): 36.9 (10 Feb 2021 04:52), Max: 36.9 (09 Feb 2021 21:51)  T(F): 98.5 (10 Feb 2021 04:52), Max: 98.5 (09 Feb 2021 21:51)  HR: 62 (10 Feb 2021 04:52) (62 - 78)  BP: 143/81 (10 Feb 2021 04:52) (143/81 - 157/88)  BP(mean): --  RR: 18 (10 Feb 2021 04:52) (18 - 18)  SpO2: 99% (10 Feb 2021 04:52) (95% - 99%)    PHYSICAL EXAMINATION:  GENERAL: NAD, obese male with NC in place at 3L  HEAD:  Atraumatic, Normocephalic  EYES:  conjunctiva and sclera clear, no scleral icterus  NECK: Supple, No JVD, Normal thyroid  CHEST/LUNG: Clear to auscultation.  No rales, rhonchi, wheezing, or rubs  HEART: Regular rate and rhythm; mild systolic murmur appreciated  ABDOMEN: Soft, Nontender, Nondistended; Bowel sounds present  NERVOUS SYSTEM:  Alert & Oriented X3,  Strength 5/5 in upper and lower extremities, sensation intact  EXTREMITIES: AV fistula on LUE with good palpable thrill and audible bruit. no dishcarge or erythema. 2+ Peripheral Pulses, No clubbing, cyanosis, or edema  SKIN: warm dry, multiple scars on AV fistula site. Pruritic rash located on LUE, Lower extremities bilateraly in location of red tattoo dye.                           11.0   6.33  )-----------( 196      ( 09 Feb 2021 07:37 )             36.7     02-09    133<L>  |  98  |  35<H>  ----------------------------<  98  4.7   |  28  |  7.03<H>    Ca    9.6      09 Feb 2021 07:37  Phos  6.9     02-09  Mg     2.3     02-09    TPro  7.4  /  Alb  3.1<L>  /  TBili  0.4  /  DBili  x   /  AST  11  /  ALT  18  /  AlkPhos  130<H>  02-09    LIVER FUNCTIONS - ( 09 Feb 2021 07:37 )  Alb: 3.1 g/dL / Pro: 7.4 g/dL / ALK PHOS: 130 U/L / ALT: 18 U/L DA / AST: 11 U/L / GGT: x                 I&O's Summary    09 Feb 2021 07:01  -  10 Feb 2021 07:00  --------------------------------------------------------  IN: 236 mL / OUT: 0 mL / NET: 236 mL        Culture - Urine (collected 09 Feb 2021 02:58)  Source: .Urine Clean Catch (Midstream)  Final Report (09 Feb 2021 22:18):    <10,000 CFU/mL Normal Urogenital Tayler        RADIOLOGY & ADDITIONAL TESTS:

## 2022-04-22 DIAGNOSIS — I10 ESSENTIAL HYPERTENSION: ICD-10-CM

## 2022-04-22 RX ORDER — AMLODIPINE BESYLATE 10 MG/1
TABLET ORAL
Qty: 90 TABLET | Refills: 1 | Status: SHIPPED | OUTPATIENT
Start: 2022-04-22 | End: 2022-10-20 | Stop reason: SDUPTHER

## 2022-05-10 RX ORDER — OXCARBAZEPINE 300 MG/1
TABLET, FILM COATED ORAL
Qty: 60 TABLET | Refills: 1 | Status: SHIPPED | OUTPATIENT
Start: 2022-05-10 | End: 2022-08-22

## 2022-06-10 ENCOUNTER — TELEPHONE (OUTPATIENT)
Dept: ONCOLOGY | Age: 67
End: 2022-06-10

## 2022-06-14 ENCOUNTER — TELEPHONE (OUTPATIENT)
Dept: ONCOLOGY | Age: 67
End: 2022-06-14

## 2022-06-20 RX ORDER — FLUOXETINE HYDROCHLORIDE 20 MG/1
CAPSULE ORAL
Qty: 90 CAPSULE | Refills: 1 | Status: SHIPPED | OUTPATIENT
Start: 2022-06-20 | End: 2022-10-20 | Stop reason: SDUPTHER

## 2022-07-27 ENCOUNTER — TELEPHONE (OUTPATIENT)
Dept: INTERNAL MEDICINE CLINIC | Age: 67
End: 2022-07-27

## 2022-07-27 DIAGNOSIS — F31.77 BIPOLAR DISORDER, IN PARTIAL REMISSION, MOST RECENT EPISODE MIXED (HCC): ICD-10-CM

## 2022-07-27 RX ORDER — BUPROPION HYDROCHLORIDE 150 MG/1
TABLET ORAL
Qty: 90 TABLET | Refills: 1 | Status: SHIPPED | OUTPATIENT
Start: 2022-07-27 | End: 2022-10-20 | Stop reason: SDUPTHER

## 2022-07-27 NOTE — TELEPHONE ENCOUNTER
Call placed to pt and left VM for call back. Need to clarify if Carmine Rubalcava is still her pcp and Carmine Rubalcava has been removed as pcp from her chart?

## 2022-08-22 RX ORDER — OXCARBAZEPINE 300 MG/1
TABLET, FILM COATED ORAL
Qty: 60 TABLET | Refills: 1 | Status: SHIPPED | OUTPATIENT
Start: 2022-08-22 | End: 2022-09-13

## 2022-09-04 DIAGNOSIS — E11.9 TYPE 2 DIABETES MELLITUS WITHOUT COMPLICATION, WITHOUT LONG-TERM CURRENT USE OF INSULIN (HCC): ICD-10-CM

## 2022-09-04 RX ORDER — METFORMIN HYDROCHLORIDE 500 MG/1
TABLET ORAL
Qty: 180 TABLET | Refills: 1 | Status: SHIPPED | OUTPATIENT
Start: 2022-09-04 | End: 2022-10-20 | Stop reason: SDUPTHER

## 2022-09-07 LAB
BACKGROUND: 480503: NORMAL
BACKGROUND: 480503: NORMAL
BASOPHILS # BLD AUTO: 0 X10E3/UL (ref 0–0.2)
BASOPHILS NFR BLD AUTO: 0 %
EOSINOPHIL # BLD AUTO: 0.1 X10E3/UL (ref 0–0.4)
EOSINOPHIL NFR BLD AUTO: 1 %
ERYTHROCYTE [DISTWIDTH] IN BLOOD BY AUTOMATED COUNT: 13.1 % (ref 11.7–15.4)
HCT VFR BLD AUTO: 46.1 % (ref 34–46.6)
HGB BLD-MCNC: 16 G/DL (ref 11.1–15.9)
IMM GRANULOCYTES # BLD AUTO: 0 X10E3/UL (ref 0–0.1)
IMM GRANULOCYTES NFR BLD AUTO: 0 %
INTERPRETATION: 480488: NORMAL
JAK2 P.V617F BLD/T QL: NORMAL
LAB DIRECTOR NAME PROVIDER: NORMAL
LAB DIRECTOR NAME PROVIDER: NORMAL
LYMPHOCYTES # BLD AUTO: 0.9 X10E3/UL (ref 0.7–3.1)
LYMPHOCYTES NFR BLD AUTO: 10 %
MCH RBC QN AUTO: 34.3 PG (ref 26.6–33)
MCHC RBC AUTO-ENTMCNC: 34.7 G/DL (ref 31.5–35.7)
MCV RBC AUTO: 99 FL (ref 79–97)
MONOCYTES # BLD AUTO: 0.8 X10E3/UL (ref 0.1–0.9)
MONOCYTES NFR BLD AUTO: 9 %
NEUTROPHILS # BLD AUTO: 7 X10E3/UL (ref 1.4–7)
NEUTROPHILS NFR BLD AUTO: 80 %
PLATELET # BLD AUTO: 284 X10E3/UL (ref 150–450)
RBC # BLD AUTO: 4.66 X10E6/UL (ref 3.77–5.28)
REF LAB TEST METHOD: NORMAL
T(ABL1,BCR)B2A2/CONTROL BLD/T: NORMAL %
T(ABL1,BCR)B3A2/CONTROL BLD/T: NORMAL %
T(ABL1,BCR)E1A2/CONTROL BLD/T: NORMAL %
WBC # BLD AUTO: 8.9 X10E3/UL (ref 3.4–10.8)

## 2022-09-13 RX ORDER — OXCARBAZEPINE 300 MG/1
TABLET, FILM COATED ORAL
Qty: 60 TABLET | Refills: 1 | Status: SHIPPED | OUTPATIENT
Start: 2022-09-13 | End: 2022-10-17

## 2022-09-20 RX ORDER — OXCARBAZEPINE 300 MG/1
TABLET, FILM COATED ORAL
Qty: 60 TABLET | Refills: 1 | Status: CANCELLED | OUTPATIENT
Start: 2022-09-20

## 2022-09-20 NOTE — TELEPHONE ENCOUNTER
[facility] faxed request for 90 day supply.  They state that patient's insurance will only cover 90 day supply  Requested Prescriptions     Pending Prescriptions Disp Refills    OXcarbazepine (TRILEPTAL) 300 mg tablet 60 Tablet 1   01/17/2022  10/20/2022  [facility]

## 2022-09-20 NOTE — TELEPHONE ENCOUNTER
Requested Prescriptions     Pending Prescriptions Disp Refills    OXcarbazepine (TRILEPTAL) 300 mg tablet 60 Tablet 1         [id number]- [place], [place] - [address] [address]  Phone: [phone] Fax: [phone]       Visit date not found is LAST OFFICE VISIT     Future Appointments   Date Time Provider Gwen Delacruz   10/5/2022 11:30 AM Deana Cobos  N Niraj Saleem BS AMB   10/20/2022  2:30 PM Bethany Dale MD [facility] BS AMB

## 2022-10-05 ENCOUNTER — OFFICE VISIT (OUTPATIENT)
Dept: ONCOLOGY | Age: 67
End: 2022-10-05
Payer: MEDICARE

## 2022-10-05 VITALS
RESPIRATION RATE: 17 BRPM | DIASTOLIC BLOOD PRESSURE: 83 MMHG | HEIGHT: 64 IN | TEMPERATURE: 96 F | SYSTOLIC BLOOD PRESSURE: 151 MMHG | OXYGEN SATURATION: 98 % | BODY MASS INDEX: 26.46 KG/M2 | WEIGHT: 155 LBS | HEART RATE: 78 BPM

## 2022-10-05 DIAGNOSIS — D72.821 MONOCYTOSIS: Primary | ICD-10-CM

## 2022-10-05 PROCEDURE — G8427 DOCREV CUR MEDS BY ELIG CLIN: HCPCS | Performed by: INTERNAL MEDICINE

## 2022-10-05 PROCEDURE — 1123F ACP DISCUSS/DSCN MKR DOCD: CPT | Performed by: INTERNAL MEDICINE

## 2022-10-05 PROCEDURE — G8419 CALC BMI OUT NRM PARAM NOF/U: HCPCS | Performed by: INTERNAL MEDICINE

## 2022-10-05 PROCEDURE — G9717 DOC PT DX DEP/BP F/U NT REQ: HCPCS | Performed by: INTERNAL MEDICINE

## 2022-10-05 PROCEDURE — G9899 SCRN MAM PERF RSLTS DOC: HCPCS | Performed by: INTERNAL MEDICINE

## 2022-10-05 PROCEDURE — 1090F PRES/ABSN URINE INCON ASSESS: CPT | Performed by: INTERNAL MEDICINE

## 2022-10-05 PROCEDURE — G8753 SYS BP > OR = 140: HCPCS | Performed by: INTERNAL MEDICINE

## 2022-10-05 PROCEDURE — 1101F PT FALLS ASSESS-DOCD LE1/YR: CPT | Performed by: INTERNAL MEDICINE

## 2022-10-05 PROCEDURE — 99214 OFFICE O/P EST MOD 30 MIN: CPT | Performed by: INTERNAL MEDICINE

## 2022-10-05 PROCEDURE — G8754 DIAS BP LESS 90: HCPCS | Performed by: INTERNAL MEDICINE

## 2022-10-05 PROCEDURE — 3017F COLORECTAL CA SCREEN DOC REV: CPT | Performed by: INTERNAL MEDICINE

## 2022-10-05 PROCEDURE — G8399 PT W/DXA RESULTS DOCUMENT: HCPCS | Performed by: INTERNAL MEDICINE

## 2022-10-05 PROCEDURE — G8536 NO DOC ELDER MAL SCRN: HCPCS | Performed by: INTERNAL MEDICINE

## 2022-10-05 NOTE — PROGRESS NOTES
[facility] [place] [facility] [place] [address] [address], [address], [address] [place] [address]  W: [phone]  F: [phone]    Reason for Visit:   Keo Deluca is a 69 y.o. female who is seen for follow up of leucocytosis    History of Present Illness:   Patient is a 69 y.o. female with COPD, diabetes, bipolar disorder who is seen for persistent leukocytosis. She had a normal WBC count until 2/2020. She has had a slightly elevated WBCs 3/2020. Most recent WBC count was elevated at 13 K. Differential notable for neutrophilia with some monocytosis. Has no other CBC abnormalities. No personal history of DVT or stroke, no B symptoms and comes with additional work up. She does smoke. She comes with her brother today. Past Medical History:   Diagnosis Date    COPD     early stage of emphysema    Depression     bipolar disease    Diabetes ([address]     Diabetes ([address]     for 15 years    Hypercholesterolemia     Hypertension     LBBB (left bundle branch block)     Psychiatric disorder     BIpolar    Renal cyst, right     left kidney in pelvis    Thyroid disease     thyroid nodule await for biopsy      Past Surgical History:   Procedure Laterality Date    HX BREAST BIOPSY Right     cyst removed at age 22    [address]      breast cyst removed from left breast      Social History     Tobacco Use    Smoking status: Every Day     Packs/day: 1.00     Years: 30.00     Pack years: 30.00     Types: Cigarettes    Smokeless tobacco: Never   Substance Use Topics    Alcohol use: Yes     Comment: 1 beer per week.       Family History   Problem Relation Age of Onset    Diabetes Mother     Hypertension Mother     Elevated Lipids Mother     Cancer Mother         breast ac    Breast Cancer Mother     Cancer Father         tongue ca    Diabetes Sister     Breast Cancer Sister     Heart Disease Brother      Current Outpatient Medications   Medication Sig    OXcarbazepine (TRILEPTAL) 300 mg tablet TAKE 2 TABLETS BY MOUTH AT BEDTIME    metFORMIN (GLUCOPHAGE) 500 mg tablet TAKE 1 TABLET BY MOUTH TWICE A DAY WITH MEALS    simvastatin (ZOCOR) 20 mg tablet TAKE 1 TABLET BY MOUTH EVERY DAY AT NIGHT    buPROPion XL (WELLBUTRIN XL) 150 mg tablet TAKE 1 TABLET BY MOUTH EVERY DAY IN THE MORNING    FLUoxetine (PROzac) 20 mg capsule TAKE 1 CAPSULE BY MOUTH EVERY DAY IN THE MORNING    amLODIPine (NORVASC) 10 mg tablet TAKE 1 TABLET BY MOUTH EVERY DAY    metoprolol tartrate (LOPRESSOR) 50 mg tablet TAKE 1 TABLET BY MOUTH EVERY DAY    ammonium lactate (LAC-HYDRIN) 12 % lotion rub in to affected area well    losartan (COZAAR) 100 mg tablet Take 1 Tablet by mouth daily. carbamide peroxide (DEBROX) 6.5 % otic solution Administer 5 Drops into each ear two (2) times a day. glucose blood VI test strips (OneTouch Ultra Test) strip USE TO CHECK BLOOD SUGAR ONCE DAILY    umeclidinium-vilanteroL (ANORO ELLIPTA) 62.5-25 mcg/actuation inhaler Take 1 Puff by inhalation daily. DC spiriva    lancets misc Check blood sugar one time a day    fluticasone propionate (FLONASE) 50 mcg/actuation nasal spray 2 Sprays by Both Nostrils route daily. Nebulizer & Compressor machine 1 Each by Does Not Apply route every four (4) hours as needed for Wheezing. As directed    albuterol (ACCUNEB) 1.25 mg/3 mL nebu Take 3 mL by inhalation every four (4) hours as needed for Wheezing (wheezing). albuterol (PROVENTIL HFA, VENTOLIN HFA, PROAIR HFA) 90 mcg/actuation inhaler Take 2 Puffs by inhalation every four (4) hours as needed for Wheezing. guaiFENesin 200 mg/5 mL liqd Take 10 mL by mouth four (4) times daily as needed for Cough.    ergocalciferol (ERGOCALCIFEROL) 1,250 mcg (50,000 unit) capsule Take 1 Cap by mouth every seven (7) days. calcium-vitamin D (OYSTER SHELL) 500 mg(1,250mg) -200 unit per tablet Take 1 Tab by mouth two (2) times daily (with meals).     diclofenac (VOLTAREN) 1 % gel APPLY TO AFFECTED AREA TWO (2) TIMES DAILY AS NEEDED.    polyethylene glycol (MIRALAX) 17 gram/dose powder TAKE 17 G BY MOUTH DAILY. SENNA PLUS 8.6-50 mg per tablet TAKE 1 TABLET EVERY DAY    ONETOUCH ULTRA TEST strip CHECK BLOOD SUGAR DAILY    cholecalciferol, vitamin D3, (VITAMIN D3) 2,000 unit tab Take  by mouth. famotidine (PEPCID) 20 mg tablet Take 1 Tab by mouth nightly. Blood-Glucose Meter monitoring kit Check sugar QD    aspirin delayed-release 81 mg tablet Take  by mouth daily. No current facility-administered medications for this visit. Allergies   Allergen Reactions    Contrast Agent [Iodine] Anaphylaxis    Pcn [Penicillins] Rash    Pcn [Penicillins] Hives        Review of Systems: A complete review of systems was obtained, negative except as described above. Physical Exam:     Vitals reviewed     ECOG PS: 1  General: No distress  Eyes: PERRLA, anicteric sclerae  MS: Normal gait and station. Digits without clubbing or cyanosis. Skin: No rashes, ecchymoses, or petechiae. Normal temperature, turgor, and texture. Psych: Alert, oriented, appropriate affect, normal judgment/insight    Results:     Lab Results   Component Value Date/Time    WBC 8.9 08/26/2022 03:07 PM    HGB 16.0 (H) 08/26/2022 03:07 PM    HCT 46.1 08/26/2022 03:07 PM    PLATELET 853 00/36/3003 03:07 PM    MCV 99 (H) 08/26/2022 03:07 PM    ABS.  NEUTROPHILS 7.0 08/26/2022 03:07 PM    Hematocrit (POC) 50 (H) 10/26/2020 06:15 PM     Lab Results   Component Value Date/Time    Sodium 130 (L) 11/22/2021 03:16 PM    Potassium 4.2 11/22/2021 03:16 PM    Chloride 94 (L) 11/22/2021 03:16 PM    CO2 19 (L) 11/22/2021 03:16 PM    Glucose 117 (H) 11/22/2021 03:16 PM    BUN 5 (L) 11/22/2021 03:16 PM    Creatinine 0.57 11/22/2021 03:16 PM    GFR est  11/22/2021 03:16 PM    GFR est non-AA 96 11/22/2021 03:16 PM    Calcium 10.1 11/22/2021 03:16 PM    Sodium (POC) 128 (L) 10/26/2020 06:15 PM    Potassium (POC) 3.9 10/26/2020 06:15 PM    Chloride (POC) 96 (L) 10/26/2020 06:15 PM    Glucose (POC) 122 (H) 10/26/2020 06:15 PM    Glucose (POC) 141 (H) 10/26/2020 03:25 PM    BUN (POC) <4 (L) 10/26/2020 06:15 PM    Creatinine (POC) 0.7 10/26/2020 06:15 PM    Calcium, ionized (POC) 1.03 (L) 10/26/2020 06:15 PM     Lab Results   Component Value Date/Time    Bilirubin, total 0.2 11/22/2021 03:16 PM    ALT (SGPT) 8 11/22/2021 03:16 PM    Alk. phosphatase 109 11/22/2021 03:16 PM    Protein, total 7.1 11/22/2021 03:16 PM    Albumin 4.3 11/22/2021 03:16 PM    Globulin 3.9 10/26/2020 03:27 PM       Lab Results   Component Value Date/Time    TSH 2.060 08/07/2019 04:09 PM    Lipase 115 03/08/2020 07:18 PM    Hep C Virus Ab <0.1 10/25/2017 01:52 PM     Negative BCR ABL and JAK2      No results found for: INR, APTT, DDIMSQ, DDIME, [id number], [id number], Alvira Croon, FDPLT, Sameera Small, [address] [place] [address] Rd, [id number], Annia Dow, [address] [facility] [address], [address], [id number], [id number], [id number], [id number], [id number], [id number], Anni Watterstis    Records reviewed and summarized above. Pathology report(s) reviewed above. Radiology report(s) reviewed above. CT chest 3/2020  Unremarkable       Assessment:   1) Neutrophilic leucocytosis  Mild persistent since 2020  Likely reactive to smoking  JAK2 and BCR ABL negative  Uptodate with age appropriate Ca screening  Consider low dose CT chest annually      CBC 8/2022 actually shows no leucocytosis    2) Smoking    3) DM    4) COPD    5) Mild erythrocytosis  Due to smoking and COPD      Plan:     No additional follow up needed. All labs reviewed and discussed  Consider annual lung cancer screening with Dr. Sosa Petersen  Communicated via EMR with Dr. Sosa Petersen    I appreciate the opportunity to participate in Ms. Too Agarwal's care.     Signed By: Denise Redman MD

## 2022-10-05 NOTE — PROGRESS NOTES
Wilmer Cavazos is a 69 y.o. female  Chief Complaint   Patient presents with    Follow-up     leucocytosis     1. Have you been to the ER, urgent care clinic since your last visit? Hospitalized since your last visit? No    2. Have you seen or consulted any other health care providers outside of the [address] [place] [address] since your last visit? Include any pap smears or colon screening.  No

## 2022-10-17 RX ORDER — OXCARBAZEPINE 300 MG/1
TABLET, FILM COATED ORAL
Qty: 60 TABLET | Refills: 1 | Status: SHIPPED | OUTPATIENT
Start: 2022-10-17

## 2022-10-20 ENCOUNTER — OFFICE VISIT (OUTPATIENT)
Dept: INTERNAL MEDICINE CLINIC | Age: 67
End: 2022-10-20
Payer: MEDICARE

## 2022-10-20 ENCOUNTER — HOSPITAL ENCOUNTER (OUTPATIENT)
Dept: GENERAL RADIOLOGY | Age: 67
Discharge: HOME | End: 2022-10-20
Payer: MEDICARE

## 2022-10-20 VITALS
WEIGHT: 154.6 LBS | OXYGEN SATURATION: 99 % | SYSTOLIC BLOOD PRESSURE: 136 MMHG | HEIGHT: 64 IN | HEART RATE: 72 BPM | TEMPERATURE: 98.1 F | DIASTOLIC BLOOD PRESSURE: 82 MMHG | RESPIRATION RATE: 16 BRPM | BODY MASS INDEX: 26.4 KG/M2

## 2022-10-20 DIAGNOSIS — Z12.31 ENCOUNTER FOR SCREENING MAMMOGRAM FOR MALIGNANT NEOPLASM OF BREAST: ICD-10-CM

## 2022-10-20 DIAGNOSIS — M25.522 LEFT ELBOW PAIN: ICD-10-CM

## 2022-10-20 DIAGNOSIS — L30.9 DERMATITIS: ICD-10-CM

## 2022-10-20 DIAGNOSIS — E55.9 VITAMIN D DEFICIENCY: ICD-10-CM

## 2022-10-20 DIAGNOSIS — I10 ESSENTIAL HYPERTENSION: ICD-10-CM

## 2022-10-20 DIAGNOSIS — J06.9 URTI (ACUTE UPPER RESPIRATORY INFECTION): ICD-10-CM

## 2022-10-20 DIAGNOSIS — Z78.0 POST-MENOPAUSE: ICD-10-CM

## 2022-10-20 DIAGNOSIS — L85.8 KERATOSIS PILARIS: ICD-10-CM

## 2022-10-20 DIAGNOSIS — E11.9 TYPE 2 DIABETES MELLITUS WITHOUT COMPLICATION, WITHOUT LONG-TERM CURRENT USE OF INSULIN (HCC): ICD-10-CM

## 2022-10-20 DIAGNOSIS — F33.0 MAJOR DEPRESSIVE DISORDER, RECURRENT, MILD (HCC): ICD-10-CM

## 2022-10-20 DIAGNOSIS — J44.9 CHRONIC OBSTRUCTIVE PULMONARY DISEASE, UNSPECIFIED COPD TYPE (HCC): Primary | ICD-10-CM

## 2022-10-20 DIAGNOSIS — F31.77 BIPOLAR DISORDER, IN PARTIAL REMISSION, MOST RECENT EPISODE MIXED (HCC): ICD-10-CM

## 2022-10-20 DIAGNOSIS — E11.21 TYPE 2 DIABETES WITH NEPHROPATHY (HCC): ICD-10-CM

## 2022-10-20 DIAGNOSIS — F10.921 ALCOHOL INTOXICATION WITH DELIRIUM (HCC): ICD-10-CM

## 2022-10-20 DIAGNOSIS — I10 PRIMARY HYPERTENSION: ICD-10-CM

## 2022-10-20 PROCEDURE — G8417 CALC BMI ABV UP PARAM F/U: HCPCS | Performed by: INTERNAL MEDICINE

## 2022-10-20 PROCEDURE — G8427 DOCREV CUR MEDS BY ELIG CLIN: HCPCS | Performed by: INTERNAL MEDICINE

## 2022-10-20 PROCEDURE — 2022F DILAT RTA XM EVC RTNOPTHY: CPT | Performed by: INTERNAL MEDICINE

## 2022-10-20 PROCEDURE — G9717 DOC PT DX DEP/BP F/U NT REQ: HCPCS | Performed by: INTERNAL MEDICINE

## 2022-10-20 PROCEDURE — 3017F COLORECTAL CA SCREEN DOC REV: CPT | Performed by: INTERNAL MEDICINE

## 2022-10-20 PROCEDURE — G9899 SCRN MAM PERF RSLTS DOC: HCPCS | Performed by: INTERNAL MEDICINE

## 2022-10-20 PROCEDURE — G8752 SYS BP LESS 140: HCPCS | Performed by: INTERNAL MEDICINE

## 2022-10-20 PROCEDURE — 99214 OFFICE O/P EST MOD 30 MIN: CPT | Performed by: INTERNAL MEDICINE

## 2022-10-20 PROCEDURE — 1090F PRES/ABSN URINE INCON ASSESS: CPT | Performed by: INTERNAL MEDICINE

## 2022-10-20 PROCEDURE — 71046 X-RAY EXAM CHEST 2 VIEWS: CPT

## 2022-10-20 PROCEDURE — G8399 PT W/DXA RESULTS DOCUMENT: HCPCS | Performed by: INTERNAL MEDICINE

## 2022-10-20 PROCEDURE — 1101F PT FALLS ASSESS-DOCD LE1/YR: CPT | Performed by: INTERNAL MEDICINE

## 2022-10-20 PROCEDURE — G8754 DIAS BP LESS 90: HCPCS | Performed by: INTERNAL MEDICINE

## 2022-10-20 PROCEDURE — 3046F HEMOGLOBIN A1C LEVEL >9.0%: CPT | Performed by: INTERNAL MEDICINE

## 2022-10-20 PROCEDURE — G8536 NO DOC ELDER MAL SCRN: HCPCS | Performed by: INTERNAL MEDICINE

## 2022-10-20 RX ORDER — BLOOD SUGAR DIAGNOSTIC
STRIP MISCELLANEOUS
Qty: 100 STRIP | Refills: 3 | Status: SHIPPED | OUTPATIENT
Start: 2022-10-20

## 2022-10-20 RX ORDER — METOPROLOL TARTRATE 50 MG/1
50 TABLET ORAL DAILY
Qty: 90 TABLET | Refills: 1 | Status: SHIPPED | OUTPATIENT
Start: 2022-10-20

## 2022-10-20 RX ORDER — METFORMIN HYDROCHLORIDE 500 MG/1
500 TABLET ORAL 2 TIMES DAILY WITH MEALS
Qty: 180 TABLET | Refills: 1 | Status: SHIPPED | OUTPATIENT
Start: 2022-10-20

## 2022-10-20 RX ORDER — LEVOFLOXACIN 250 MG/1
250 TABLET ORAL DAILY
Qty: 10 TABLET | Refills: 0 | Status: SHIPPED | OUTPATIENT
Start: 2022-10-20

## 2022-10-20 RX ORDER — BUPROPION HYDROCHLORIDE 150 MG/1
150 TABLET ORAL DAILY
Qty: 90 TABLET | Refills: 1 | Status: SHIPPED | OUTPATIENT
Start: 2022-10-20

## 2022-10-20 RX ORDER — INSULIN PUMP SYRINGE, 3 ML
EACH MISCELLANEOUS
Qty: 1 KIT | Refills: 0 | Status: SHIPPED | OUTPATIENT
Start: 2022-10-20

## 2022-10-20 RX ORDER — AMLODIPINE BESYLATE 10 MG/1
10 TABLET ORAL DAILY
Qty: 90 TABLET | Refills: 1 | Status: SHIPPED | OUTPATIENT
Start: 2022-10-20

## 2022-10-20 RX ORDER — DICLOFENAC SODIUM 10 MG/G
GEL TOPICAL
Qty: 100 G | Refills: 1 | Status: SHIPPED | OUTPATIENT
Start: 2022-10-20

## 2022-10-20 RX ORDER — LANCETS
EACH MISCELLANEOUS
Qty: 100 EACH | Refills: 4 | Status: SHIPPED | OUTPATIENT
Start: 2022-10-20

## 2022-10-20 RX ORDER — FLUOXETINE HYDROCHLORIDE 20 MG/1
CAPSULE ORAL
Qty: 90 CAPSULE | Refills: 1 | Status: SHIPPED | OUTPATIENT
Start: 2022-10-20

## 2022-10-20 RX ORDER — ALBUTEROL SULFATE 90 UG/1
2 AEROSOL, METERED RESPIRATORY (INHALATION)
Qty: 1 EACH | Refills: 2 | Status: SHIPPED | OUTPATIENT
Start: 2022-10-20

## 2022-10-20 RX ORDER — AMMONIUM LACTATE 12 G/100G
LOTION TOPICAL
Qty: 400 ML | Refills: 2 | Status: SHIPPED | OUTPATIENT
Start: 2022-10-20

## 2022-10-20 NOTE — PROGRESS NOTES
HISTORY OF PRESENT ILLNESS  Albin Courtney is a 69 y.o. female here for follow up. She is here after almost 1 year. Started coughing and wheezing for last 1 month. Cough is progressively getting worse. Mild shortness of breath. Has COPD, still smoking. Not using any inhaler, ran out of Anoro Ellipta. Need refill. Has diabetes. watching diet. On metformin. Need glucometer and strips and lancets. Need lab work. She has bipolar disorder. seeing psych. stable. Seeing her psychiatrist Dr. Amira Burciaga 2. On Trileptal, prescription refilled by me. I have advised her to see psychiatrist.  Has hypertension, compliant with medicine. No chest pain palpitation or shortness of breath. Need bone density mammogram.  Diabetes    Medication Refill    Hypertension   Pertinent negatives include no palpitations and no blurred vision. Medication Evaluation    COPD    Mental Health Problem    Cough      Review of Systems   Constitutional: Negative. Negative for chills and fever. HENT: Negative. Eyes: Negative. Negative for blurred vision and double vision. Respiratory:  Positive for cough and wheezing. Cardiovascular: Negative. Negative for palpitations. Gastrointestinal: Negative. Genitourinary: Negative. Musculoskeletal: Negative. Neurological: Negative. Psychiatric/Behavioral: Negative. Physical Exam  Constitutional:       General: She is not in acute distress. Appearance: Normal appearance. She is well-developed. She is obese. HENT:      Nose: Congestion present. Comments: Nasal turbinates:red inflamed,NT    Cobble stoning present. Mouth/Throat:      Mouth: Mucous membranes are moist.   Neck:      Thyroid: No thyromegaly. Vascular: No JVD. Cardiovascular:      Rate and Rhythm: Normal rate and regular rhythm. Pulses: Normal pulses. Heart sounds: Normal heart sounds. Pulmonary:      Effort: Pulmonary effort is normal. No respiratory distress.       Breath sounds: No wheezing. Comments: Bibasilar crackles present. Sporadic wheezing and rhonchi present. Abdominal:      General: Abdomen is flat. Bowel sounds are normal.      Palpations: Abdomen is soft. Musculoskeletal:         General: No tenderness. Normal range of motion. Cervical back: Normal range of motion and neck supple. Comments:        Neurological:      General: No focal deficit present. Mental Status: She is alert and oriented to person, place, and time. Mental status is at baseline. Psychiatric:         Mood and Affect: Mood normal.         Behavior: Behavior normal.       ASSESSMENT and PLAN    Diagnoses and all orders for this visit:    1. Chronic obstructive pulmonary disease, unspecified COPD type ([facility] [address]    She is still smoking but cut down. Not using any inhaler. Wheezing all over. Will restart,  -     umeclidinium-vilanteroL (ANORO ELLIPTA) 62.5-25 mcg/actuation inhaler; Take 1 Puff by inhalation daily. DC spiriva  -     albuterol (PROVENTIL HFA, VENTOLIN HFA, PROAIR HFA) 90 mcg/actuation inhaler; Take 2 Puffs by inhalation every four (4) hours as needed for Wheezing. 2. Bipolar disorder, in partial remission, most recent episode mixed (HCC)  Stable. We will give,  -     buPROPion XL (WELLBUTRIN XL) 150 mg tablet; Take 1 Tablet by mouth daily. 3. Alcohol intoxication with delirium ([facility]  Doing well, not alcoholic right now. 4. Keratosis pilaris  -     ammonium lactate (LAC-HYDRIN) 12 % lotion; rub in to affected area well      6. Essential hypertension    Stable blood pressure. Will refill,  -     metoprolol tartrate (LOPRESSOR) 50 mg tablet; Take 1 Tablet by mouth daily. -     amLODIPine (NORVASC) 10 mg tablet; Take 1 Tablet by mouth daily. 7. Left knee pain  Probably from DJD. We will give,  -     diclofenac (VOLTAREN) 1 % gel; Top BID    8.  Type 2 diabetes mellitus without complication, without long-term current use of insulin (HCC)    Advised to be 1800-calorie ADA diet and exercise. Will refill,  -     metFORMIN (GLUCOPHAGE) 500 mg tablet; Take 1 Tablet by mouth two (2) times daily (with meals). -     Blood-Glucose Meter monitoring kit; Check sugar BID. DX:DM type2  -     glucose blood VI test strips (OneTouch Ultra Test) strip; USE TO CHECK BLOOD SUGAR ONCE DAILY  -     lancets misc; Check blood sugar one time a day  -     METABOLIC PANEL, COMPREHENSIVE  -     LIPID PANEL  -     MICROALBUMIN, UR, RAND W/ MICROALB/CREAT RATIO  -     HEMOGLOBIN A1C WITH EAG        10. Major depressive disorder, recurrent, mild  -     FLUoxetine (PROzac) 20 mg capsule; TAKE 1 CAPSULE BY MOUTH EVERY DAY IN THE MORNING    11. Type 2 diabetes with nephropathy (HCC)  -     glucose blood VI test strips (OneTouch Ultra Test) strip; USE TO CHECK BLOOD SUGAR ONCE DAILY  -     lancets misc; Check blood sugar one time a day    12. Vitamin D deficiency  -     VITAMIN D, 25 HYDROXY    13. URTI (acute upper respiratory infection)    Need to rule out pneumonia. Will check,  -     XR CHEST PA LAT; Future  -     levoFLOXacin (LEVAQUIN) 250 mg tablet; Take 1 Tablet by mouth daily. 15. Encounter for screening mammogram for malignant neoplasm of breast  -     VICKIE MAMMO BI SCREENING INCL CAD; Future    15. Post-menopause  -     DEXA BONE DENSITY STUDY AXIAL; Future   Discussed expected course/resolution/complications of diagnosis in detail with patient. Medication risks/benefits/costs/interactions/alternatives discussed with patient. Discussed COVID-19 infection precaution with patient. Pt was given an after visit summary which includes diagnoses, current medications & vitals. Pt expressed understanding with the diagnosis and plan.

## 2022-10-21 LAB
25(OH)D3+25(OH)D2 SERPL-MCNC: 16.9 NG/ML (ref 30–100)
ALBUMIN SERPL-MCNC: 4.3 G/DL (ref 3.8–4.8)
ALBUMIN/CREAT UR: 373 MG/G CREAT (ref 0–29)
ALBUMIN/GLOB SERPL: 1.5 {RATIO} (ref 1.2–2.2)
ALP SERPL-CCNC: 105 IU/L (ref 44–121)
ALT SERPL-CCNC: 9 IU/L (ref 0–32)
AST SERPL-CCNC: 14 IU/L (ref 0–40)
BILIRUB SERPL-MCNC: 0.3 MG/DL (ref 0–1.2)
BUN SERPL-MCNC: 5 MG/DL (ref 8–27)
BUN/CREAT SERPL: 9 (ref 12–28)
CALCIUM SERPL-MCNC: 9.7 MG/DL (ref 8.7–10.3)
CHLORIDE SERPL-SCNC: 95 MMOL/L (ref 96–106)
CHOLEST SERPL-MCNC: 196 MG/DL (ref 100–199)
CO2 SERPL-SCNC: 22 MMOL/L (ref 20–29)
CREAT SERPL-MCNC: 0.56 MG/DL (ref 0.57–1)
CREAT UR-MCNC: 68.2 MG/DL
EGFR: 99 ML/MIN/1.73
EST. AVERAGE GLUCOSE BLD GHB EST-MCNC: 114 MG/DL
GLOBULIN SER CALC-MCNC: 2.8 G/DL (ref 1.5–4.5)
GLUCOSE SERPL-MCNC: 97 MG/DL (ref 70–99)
HBA1C MFR BLD: 5.6 % (ref 4.8–5.6)
HDLC SERPL-MCNC: 111 MG/DL
IMP & REVIEW OF LAB RESULTS: NORMAL
LDLC SERPL CALC-MCNC: 73 MG/DL (ref 0–99)
MICROALBUMIN UR-MCNC: 254.5 UG/ML
POTASSIUM SERPL-SCNC: 5.1 MMOL/L (ref 3.5–5.2)
PROT SERPL-MCNC: 7.1 G/DL (ref 6–8.5)
SODIUM SERPL-SCNC: 131 MMOL/L (ref 134–144)
TRIGL SERPL-MCNC: 68 MG/DL (ref 0–149)
VLDLC SERPL CALC-MCNC: 12 MG/DL (ref 5–40)

## 2022-10-21 RX ORDER — ERGOCALCIFEROL 1.25 MG/1
50000 CAPSULE ORAL
Qty: 4 CAPSULE | Refills: 2 | Status: SHIPPED | OUTPATIENT
Start: 2022-10-21 | End: 2023-01-07

## 2022-10-21 NOTE — PROGRESS NOTES
Vitamin D level is low. Vitamin D 50,000 units weekly x 12 weeks. After completion of 12 weeks, recommend OTC Vitamin D3 1000 units daily.

## 2022-10-24 ENCOUNTER — TELEPHONE (OUTPATIENT)
Dept: INTERNAL MEDICINE CLINIC | Age: 67
End: 2022-10-24

## 2022-10-24 NOTE — TELEPHONE ENCOUNTER
----- Message from Stephen Anne NP sent at 10/21/2022  4:08 PM EDT -----  Vitamin D level is low. Vitamin D 50,000 units weekly x 12 weeks. After completion of 12 weeks, recommend OTC Vitamin D3 1000 units daily.
Alona Gustafson was called and verbalized understanding on note below.
Resident and nursing staff

## 2022-11-09 RX ORDER — OXCARBAZEPINE 300 MG/1
TABLET, FILM COATED ORAL
Qty: 60 TABLET | Refills: 1 | Status: SHIPPED | OUTPATIENT
Start: 2022-11-09

## 2022-11-16 DIAGNOSIS — E78.00 PURE HYPERCHOLESTEROLEMIA: ICD-10-CM

## 2022-11-17 RX ORDER — SIMVASTATIN 20 MG/1
TABLET, FILM COATED ORAL
Qty: 90 TABLET | Refills: 0 | Status: SHIPPED | OUTPATIENT
Start: 2022-11-17

## 2022-11-21 ENCOUNTER — OFFICE VISIT (OUTPATIENT)
Dept: INTERNAL MEDICINE CLINIC | Age: 67
End: 2022-11-21
Payer: MEDICARE

## 2022-11-21 VITALS
HEART RATE: 68 BPM | RESPIRATION RATE: 16 BRPM | DIASTOLIC BLOOD PRESSURE: 82 MMHG | OXYGEN SATURATION: 96 % | TEMPERATURE: 97.1 F | SYSTOLIC BLOOD PRESSURE: 132 MMHG | HEIGHT: 64 IN | BODY MASS INDEX: 26.91 KG/M2 | WEIGHT: 157.6 LBS

## 2022-11-21 DIAGNOSIS — Z00.00 MEDICARE ANNUAL WELLNESS VISIT, SUBSEQUENT: ICD-10-CM

## 2022-11-21 DIAGNOSIS — E11.22 TYPE 2 DIABETES MELLITUS WITH CHRONIC KIDNEY DISEASE, WITH LONG-TERM CURRENT USE OF INSULIN, UNSPECIFIED CKD STAGE (HCC): ICD-10-CM

## 2022-11-21 DIAGNOSIS — Z71.89 ACP (ADVANCE CARE PLANNING): ICD-10-CM

## 2022-11-21 DIAGNOSIS — E55.9 VITAMIN D DEFICIENCY: ICD-10-CM

## 2022-11-21 DIAGNOSIS — F33.0 MAJOR DEPRESSIVE DISORDER, RECURRENT, MILD (HCC): ICD-10-CM

## 2022-11-21 DIAGNOSIS — J44.9 CHRONIC OBSTRUCTIVE PULMONARY DISEASE, UNSPECIFIED COPD TYPE (HCC): ICD-10-CM

## 2022-11-21 DIAGNOSIS — Z79.4 TYPE 2 DIABETES MELLITUS WITH CHRONIC KIDNEY DISEASE, WITH LONG-TERM CURRENT USE OF INSULIN, UNSPECIFIED CKD STAGE (HCC): ICD-10-CM

## 2022-11-21 DIAGNOSIS — E11.9 TYPE 2 DIABETES MELLITUS WITHOUT COMPLICATION, WITHOUT LONG-TERM CURRENT USE OF INSULIN (HCC): ICD-10-CM

## 2022-11-21 DIAGNOSIS — I10 ESSENTIAL HYPERTENSION: Primary | ICD-10-CM

## 2022-11-21 PROCEDURE — G8427 DOCREV CUR MEDS BY ELIG CLIN: HCPCS | Performed by: INTERNAL MEDICINE

## 2022-11-21 PROCEDURE — G8536 NO DOC ELDER MAL SCRN: HCPCS | Performed by: INTERNAL MEDICINE

## 2022-11-21 PROCEDURE — G8752 SYS BP LESS 140: HCPCS | Performed by: INTERNAL MEDICINE

## 2022-11-21 PROCEDURE — 3044F HG A1C LEVEL LT 7.0%: CPT | Performed by: INTERNAL MEDICINE

## 2022-11-21 PROCEDURE — G8417 CALC BMI ABV UP PARAM F/U: HCPCS | Performed by: INTERNAL MEDICINE

## 2022-11-21 PROCEDURE — 3017F COLORECTAL CA SCREEN DOC REV: CPT | Performed by: INTERNAL MEDICINE

## 2022-11-21 PROCEDURE — 99214 OFFICE O/P EST MOD 30 MIN: CPT | Performed by: INTERNAL MEDICINE

## 2022-11-21 PROCEDURE — 2022F DILAT RTA XM EVC RTNOPTHY: CPT | Performed by: INTERNAL MEDICINE

## 2022-11-21 PROCEDURE — G8399 PT W/DXA RESULTS DOCUMENT: HCPCS | Performed by: INTERNAL MEDICINE

## 2022-11-21 PROCEDURE — G0439 PPPS, SUBSEQ VISIT: HCPCS | Performed by: INTERNAL MEDICINE

## 2022-11-21 PROCEDURE — 1090F PRES/ABSN URINE INCON ASSESS: CPT | Performed by: INTERNAL MEDICINE

## 2022-11-21 PROCEDURE — G9899 SCRN MAM PERF RSLTS DOC: HCPCS | Performed by: INTERNAL MEDICINE

## 2022-11-21 PROCEDURE — 1101F PT FALLS ASSESS-DOCD LE1/YR: CPT | Performed by: INTERNAL MEDICINE

## 2022-11-21 PROCEDURE — G8754 DIAS BP LESS 90: HCPCS | Performed by: INTERNAL MEDICINE

## 2022-11-21 PROCEDURE — 99497 ADVNCD CARE PLAN 30 MIN: CPT | Performed by: INTERNAL MEDICINE

## 2022-11-21 PROCEDURE — G9717 DOC PT DX DEP/BP F/U NT REQ: HCPCS | Performed by: INTERNAL MEDICINE

## 2022-11-21 RX ORDER — ERGOCALCIFEROL 1.25 MG/1
50000 CAPSULE ORAL
Qty: 4 CAPSULE | Refills: 2 | Status: SHIPPED | OUTPATIENT
Start: 2022-11-21 | End: 2023-02-07

## 2022-11-21 RX ORDER — METOPROLOL TARTRATE 50 MG/1
50 TABLET ORAL DAILY
Qty: 90 TABLET | Refills: 1 | Status: SHIPPED | OUTPATIENT
Start: 2022-11-21

## 2022-11-21 RX ORDER — LOSARTAN POTASSIUM 100 MG/1
100 TABLET ORAL DAILY
Qty: 90 TABLET | Refills: 1 | Status: SHIPPED | OUTPATIENT
Start: 2022-11-21

## 2022-11-21 NOTE — PROGRESS NOTES
HISTORY OF PRESENT ILLNESS  Julio Cesar Rothman is a 69 y.o. female here for follow up. She is here after almost 1 year. Has diabetes. watching diet. On metformin. Lab work done, A1c stable. Need foot exam.  Eye checkup up-to-date. She has bipolar disorder. seeing psych. stable. On Trileptal, prescription refilled by me. I have advised her to see psychiatrist.  Has hypertension, compliant with medicine. No chest pain palpitation or shortness of breath. Here for Medicare wellness visit. Has no living will. Labs reviewed, vitamin D level very low. Diabetes    Medication Refill    Hypertension   Pertinent negatives include no palpitations and no blurred vision. COPD    Mental Health Problem    Follow Up Chronic Condition      Review of Systems   Constitutional: Negative. Negative for chills and fever. Eyes: Negative. Negative for blurred vision and double vision. Cardiovascular: Negative. Negative for palpitations. Gastrointestinal: Negative. Genitourinary: Negative. Musculoskeletal: Negative. Neurological: Negative. Psychiatric/Behavioral: Negative. Physical Exam  Constitutional:       General: She is not in acute distress. Appearance: Normal appearance. She is well-developed. She is obese. HENT:      Mouth/Throat:      Mouth: Mucous membranes are moist.   Neck:      Thyroid: No thyromegaly. Vascular: No JVD. Cardiovascular:      Rate and Rhythm: Normal rate and regular rhythm. Pulses: Normal pulses. Heart sounds: Normal heart sounds. Pulmonary:      Effort: Pulmonary effort is normal. No respiratory distress. Breath sounds: No wheezing. Comments: Bibasilar crackles present. Sporadic wheezing and rhonchi present. Abdominal:      General: Abdomen is flat. Bowel sounds are normal.      Palpations: Abdomen is soft. Musculoskeletal:         General: No tenderness. Normal range of motion. Cervical back: Normal range of motion and neck supple. Comments:     Diabetic foot exam:     Left Foot:   Visual Exam: normal    Pulse DP: 2+ (normal)   Filament test: normal sensation    Vibratory sensation: normal      Right Foot:   Visual Exam: normal    Pulse DP: 2+ (normal)   Filament test: normal sensation    Vibratory sensation: normal       Neurological:      General: No focal deficit present. Mental Status: She is alert and oriented to person, place, and time. Mental status is at baseline. Psychiatric:         Mood and Affect: Mood normal.         Behavior: Behavior normal.       ASSESSMENT and PLAN  Diagnoses and all orders for this visit:    1. Essential hypertension    Stable blood pressure. Will refill,  -     losartan (COZAAR) 100 mg tablet; Take 1 Tablet by mouth daily. -     metoprolol tartrate (LOPRESSOR) 50 mg tablet; Take 1 Tablet by mouth daily. 2. Chronic obstructive pulmonary disease, unspecified COPD type ([address]  Stable. On inhaler. 3. Type 2 diabetes mellitus without complication, without long-term current use of insulin (HCC)    A1c stable. On metformin. 5. Major depressive disorder, recurrent, mild  Doing well, seeing psychiatrist.  On Trileptal and Wellbutrin. 6. Medicare annual wellness visit, subsequent    7. ACP (advance care planning)    8. Vitamin D deficiency    Very low vitamin D. We will call in,  -     ergocalciferol (ERGOCALCIFEROL) 1,250 mcg (50,000 unit) capsule; Take 1 Capsule by mouth every seven (7) days for 12 doses. Issues reviewed with her: referral to Diabetic Education department, diabetic diet discussed in detail, written exchange diet given, home glucose monitoring emphasized, all medications, side effects and compliance discussed carefully, foot care discussed and Podiatry visits discussed, annual eye examinations at Ophthalmology discussed, long term diabetic complications discussed and labs immediately prior to next visit.

## 2022-11-21 NOTE — PROGRESS NOTES
This is the Subsequent Medicare Annual Wellness Exam, performed 12 months or more after the Initial AWV or the last Subsequent AWV    I have reviewed the patient's medical history in detail and updated the computerized patient record. Assessment/Plan   Education and counseling provided:  Are appropriate based on today's review and evaluation  End-of-Life planning (with patient's consent)  Pneumococcal Vaccine  Screening Mammography  Colorectal cancer screening tests  Bone mass measurement (DEXA)  Screening for glaucoma         Depression Risk Factor Screening     3 most recent PHQ Screens 11/21/2022   PHQ Not Done -   Little interest or pleasure in doing things Not at all   Feeling down, depressed, irritable, or hopeless Not at all   Total Score PHQ 2 0   Trouble falling or staying asleep, or sleeping too much -   Feeling tired or having little energy -   Poor appetite, weight loss, or overeating -   Feeling bad about yourself - or that you are a failure or have let yourself or your family down -   Trouble concentrating on things such as school, work, reading, or watching TV -   Moving or speaking so slowly that other people could have noticed; or the opposite being so fidgety that others notice -   Thoughts of being better off dead, or hurting yourself in some way -   PHQ 9 Score -   How difficult have these problems made it for you to do your work, take care of your home and get along with others -       Alcohol & Drug Abuse Risk Screen    Do you average more than 1 drink per night or more than 7 drinks a week:  yes    On any one occasion in the past three months have you have had more than 3 drinks containing alcohol:  Yes          Functional Ability and Level of Safety    Hearing: Hearing is good. Activities of Daily Living: The home contains: no safety equipment.   Patient does total self care      Ambulation: with no difficulty     Fall Risk:  Fall Risk Assessment, last 12 mths 10/5/2022   Able to walk? Yes   Fall in past 12 months? 0   Do you feel unsteady?  0   Are you worried about falling 0      Abuse Screen:  Patient is not abused       Cognitive Screening    Has your family/caregiver stated any concerns about your memory: no     Cognitive Screening: Normal - MMSE (Mini Mental Status Exam)    Health Maintenance Due     Health Maintenance Due   Topic Date Due    COVID-19 Vaccine (1) Never done    DTaP/Tdap/Td series (1 - Tdap) Never done    Shingrix Vaccine Age 50> (1 of 2) Never done    Low dose CT lung screening  Never done    Pneumococcal 65+ years (2 - PPSV23 if available, else PCV20) 08/10/2017    Breast Cancer Screen Mammogram  08/07/2020    Eye Exam Retinal or Dilated  03/08/2021    Flu Vaccine (1) Never done    Colorectal Cancer Screening Combo  10/24/2022    Foot Exam Q1  11/22/2022       Patient Care Team   Patient Care Team:  Nadira Bolaños MD as PCP - General (Internal Medicine Physician)  Nadira Bolaños MD as PCP - [address] [place] [address] Provider  Nadira Bolaños MD as PCP - [facility] (Internal Medicine Physician)    History     Patient Active Problem List   Diagnosis Code    Pure hypercholesterolemia E78.00    COPD (chronic obstructive pulmonary disease) ([facility] J44.9    Morbid obesity ([facility] [facility] E66.01    Bipolar disorder ([facility] [address] [facility] F31.9    HTN (hypertension) I10    Type 2 diabetes mellitus without complication ([facility] [address] V54.8    Colonoscopy refused Z53.20    Hyponatremia E87.1    LBBB (left bundle branch block) I44.7    Refused pneumococcal vaccine Z28.21    Type 2 diabetes mellitus with chronic kidney disease ([address] E11.22    Major depressive disorder, recurrent, mild F33.0    Major depressive disorder, recurrent, moderate F33.1    Major depressive disorder, recurrent, unspecified F33.9    Monocytosis D72.821    Alcohol intoxication with delirium ([address] F10.921     Past Medical History:   Diagnosis Date    COPD     early stage of emphysema    Depression     bipolar disease    Diabetes ([facility] [address] Diabetes ([address]     for 15 years    Hypercholesterolemia     Hypertension     LBBB (left bundle branch block)     Psychiatric disorder     BIpolar    Renal cyst, right     left kidney in pelvis    Thyroid disease     thyroid nodule await for biopsy      Past Surgical History:   Procedure Laterality Date    HX BREAST BIOPSY Right     cyst removed at age 22    IN BREAST SURGERY PROCEDURE UNLISTED      breast cyst removed from left breast     Current Outpatient Medications   Medication Sig Dispense Refill    simvastatin (ZOCOR) 20 mg tablet TAKE 1 TABLET BY MOUTH EVERY DAY AT NIGHT 90 Tablet 0    OXcarbazepine (TRILEPTAL) 300 mg tablet TAKE 2 TABLETS BY MOUTH AT BEDTIME 60 Tablet 1    ergocalciferol (ERGOCALCIFEROL) 1,250 mcg (50,000 unit) capsule Take 1 Capsule by mouth every seven (7) days for 12 doses. 4 Capsule 2    buPROPion XL (WELLBUTRIN XL) 150 mg tablet Take 1 Tablet by mouth daily. 90 Tablet 1    umeclidinium-vilanteroL (ANORO ELLIPTA) 62.5-25 mcg/actuation inhaler Take 1 Puff by inhalation daily. DC spiriva 1 Each 1    ammonium lactate (LAC-HYDRIN) 12 % lotion rub in to affected area well 400 mL 2    metoprolol tartrate (LOPRESSOR) 50 mg tablet Take 1 Tablet by mouth daily. 90 Tablet 1    diclofenac (VOLTAREN) 1 % gel Top  g 1    FLUoxetine (PROzac) 20 mg capsule TAKE 1 CAPSULE BY MOUTH EVERY DAY IN THE MORNING 90 Capsule 1    amLODIPine (NORVASC) 10 mg tablet Take 1 Tablet by mouth daily. 90 Tablet 1    metFORMIN (GLUCOPHAGE) 500 mg tablet Take 1 Tablet by mouth two (2) times daily (with meals). 180 Tablet 1    Blood-Glucose Meter monitoring kit Check sugar BID. DX:DM type2 1 Kit 0    glucose blood VI test strips (OneTouch Ultra Test) strip USE TO CHECK BLOOD SUGAR ONCE DAILY 100 Strip 3    lancets misc Check blood sugar one time a day 100 Each 4    albuterol (PROVENTIL HFA, VENTOLIN HFA, PROAIR HFA) 90 mcg/actuation inhaler Take 2 Puffs by inhalation every four (4) hours as needed for Wheezing.  1 Each 2    levoFLOXacin (LEVAQUIN) 250 mg tablet Take 1 Tablet by mouth daily. 10 Tablet 0    losartan (COZAAR) 100 mg tablet Take 1 Tablet by mouth daily. 90 Tablet 1    carbamide peroxide (DEBROX) 6.5 % otic solution Administer 5 Drops into each ear two (2) times a day. 30 mL 0    fluticasone propionate (FLONASE) 50 mcg/actuation nasal spray 2 Sprays by Both Nostrils route daily. 1 Bottle 0    Nebulizer & Compressor machine 1 Each by Does Not Apply route every four (4) hours as needed for Wheezing. As directed 1 Each 0    guaiFENesin 200 mg/5 mL liqd Take 10 mL by mouth four (4) times daily as needed for Cough. 120 mL 0    calcium-vitamin D (OYSTER SHELL) 500 mg(1,250mg) -200 unit per tablet Take 1 Tab by mouth two (2) times daily (with meals). 180 Tab 4    cholecalciferol, vitamin D3, 50 mcg (2,000 unit) tab Take  by mouth. famotidine (PEPCID) 20 mg tablet Take 1 Tab by mouth nightly. 30 Tab 0    aspirin delayed-release 81 mg tablet Take  by mouth daily. Allergies   Allergen Reactions    Contrast Agent [Iodine] Anaphylaxis    Pcn [Penicillins] Rash    Pcn [Penicillins] Hives       Family History   Problem Relation Age of Onset    Diabetes Mother     Hypertension Mother     Elevated Lipids Mother     Cancer Mother         breast ac    Breast Cancer Mother     Cancer Father         tongue ca    Diabetes Sister     Breast Cancer Sister     Heart Disease Brother      Social History     Tobacco Use    Smoking status: Every Day     Packs/day: 1.00     Years: 30.00     Pack years: 30.00     Types: Cigarettes    Smokeless tobacco: Never   Substance Use Topics    Alcohol use: Yes     Comment: 1 beer per week.          Javier Babin MD

## 2022-11-21 NOTE — PROGRESS NOTES
Nursing staff confirmed patient with full name and . Prepared patient for visit today by obtaining vitals, verifying medication list and allergies, and briefly discussing reason for visit. Chief Complaint   Patient presents with    Follow Up Chronic Condition    Diabetes    Hypertension    COPD       Current Outpatient Medications   Medication Sig    simvastatin (ZOCOR) 20 mg tablet TAKE 1 TABLET BY MOUTH EVERY DAY AT NIGHT    OXcarbazepine (TRILEPTAL) 300 mg tablet TAKE 2 TABLETS BY MOUTH AT BEDTIME    ergocalciferol (ERGOCALCIFEROL) 1,250 mcg (50,000 unit) capsule Take 1 Capsule by mouth every seven (7) days for 12 doses. buPROPion XL (WELLBUTRIN XL) 150 mg tablet Take 1 Tablet by mouth daily. umeclidinium-vilanteroL (ANORO ELLIPTA) 62.5-25 mcg/actuation inhaler Take 1 Puff by inhalation daily. DC spiriva    ammonium lactate (LAC-HYDRIN) 12 % lotion rub in to affected area well    metoprolol tartrate (LOPRESSOR) 50 mg tablet Take 1 Tablet by mouth daily. diclofenac (VOLTAREN) 1 % gel Top BID    FLUoxetine (PROzac) 20 mg capsule TAKE 1 CAPSULE BY MOUTH EVERY DAY IN THE MORNING    amLODIPine (NORVASC) 10 mg tablet Take 1 Tablet by mouth daily. metFORMIN (GLUCOPHAGE) 500 mg tablet Take 1 Tablet by mouth two (2) times daily (with meals). Blood-Glucose Meter monitoring kit Check sugar BID. DX:DM type2    glucose blood VI test strips (OneTouch Ultra Test) strip USE TO CHECK BLOOD SUGAR ONCE DAILY    lancets misc Check blood sugar one time a day    albuterol (PROVENTIL HFA, VENTOLIN HFA, PROAIR HFA) 90 mcg/actuation inhaler Take 2 Puffs by inhalation every four (4) hours as needed for Wheezing. levoFLOXacin (LEVAQUIN) 250 mg tablet Take 1 Tablet by mouth daily. losartan (COZAAR) 100 mg tablet Take 1 Tablet by mouth daily. carbamide peroxide (DEBROX) 6.5 % otic solution Administer 5 Drops into each ear two (2) times a day.     fluticasone propionate (FLONASE) 50 mcg/actuation nasal spray 2 Sprays by Both Nostrils route daily. Nebulizer & Compressor machine 1 Each by Does Not Apply route every four (4) hours as needed for Wheezing. As directed    guaiFENesin 200 mg/5 mL liqd Take 10 mL by mouth four (4) times daily as needed for Cough. calcium-vitamin D (OYSTER SHELL) 500 mg(1,250mg) -200 unit per tablet Take 1 Tab by mouth two (2) times daily (with meals). cholecalciferol, vitamin D3, 50 mcg (2,000 unit) tab Take  by mouth. famotidine (PEPCID) 20 mg tablet Take 1 Tab by mouth nightly. aspirin delayed-release 81 mg tablet Take  by mouth daily. No current facility-administered medications for this visit. 1. \"Have you been to the ER, urgent care clinic since your last visit? Hospitalized since your last visit? \" No    2. \"Have you seen or consulted any other health care providers outside of the [address] [place] [address] since your last visit? \" No     3. For patients aged 39-70: Has the patient had a colonoscopy / FIT/ Cologuard? No      If the patient is female:    4. For patients aged 41-77: Has the patient had a mammogram within the past 2 years? Yes - no Care Gap present      5. For patients aged 21-65: Has the patient had a pap smear?  NA - based on age or sex

## 2022-11-21 NOTE — PATIENT INSTRUCTIONS
Medicare Wellness Visit, Female     The best way to live healthy is to have a lifestyle where you eat a well-balanced diet, exercise regularly, limit alcohol use, and quit all forms of tobacco/nicotine, if applicable. Regular preventive services are another way to keep healthy. Preventive services (vaccines, screening tests, monitoring & exams) can help personalize your care plan, which helps you manage your own care. Screening tests can find health problems at the earliest stages, when they are easiest to treat. [facility] follows the current, evidence-based guidelines published by the [facility] ([facility]) when recommending preventive services for our patients. Because we follow these guidelines, sometimes recommendations change over time as research supports it. (For example, mammograms used to be recommended annually. Even though Medicare will still pay for an annual mammogram, the newer guidelines recommend a mammogram every two years for women of average risk). Of course, you and your doctor may decide to screen more often for some diseases, based on your risk and your co-morbidities (chronic disease you are already diagnosed with). Preventive services for you include:  - Medicare offers their members a free annual wellness visit, which is time for you and your primary care provider to discuss and plan for your preventive service needs. Take advantage of this benefit every year!  -All adults over the age of 72 should receive the recommended pneumonia vaccines. Current USPSTF guidelines recommend a series of two vaccines for the best pneumonia protection.   -All adults should have a flu vaccine yearly and a tetanus vaccine every 10 years.   -All adults age 48 and older should receive the shingles vaccines (series of two vaccines).       -All adults age 38-68 who are overweight should have a diabetes screening test once every three years.   -All adults born between [birth year] and 1967 should be screened once for Hepatitis C.  -Other screening tests and preventive services for persons with diabetes include: an eye exam to screen for diabetic retinopathy, a kidney function test, a foot exam, and stricter control over your cholesterol.   -Cardiovascular screening for adults with routine risk involves an electrocardiogram (ECG) at intervals determined by your doctor.   -Colorectal cancer screenings should be done for adults age 54-65 with no increased risk factors for colorectal cancer. There are a number of acceptable methods of screening for this type of cancer. Each test has its own benefits and drawbacks. Discuss with your doctor what is most appropriate for you during your annual wellness visit. The different tests include: colonoscopy (considered the best screening method), a fecal occult blood test, a fecal DNA test, and sigmoidoscopy.    -A bone mass density test is recommended when a woman turns 65 to screen for osteoporosis. This test is only recommended one time, as a screening. Some providers will use this same test as a disease monitoring tool if you already have osteoporosis. -Breast cancer screenings are recommended every other year for women of normal risk, age 54-69.  -Cervical cancer screenings for women over age 72 are only recommended with certain risk factors.      Here is a list of your current Health Maintenance items (your personalized list of preventive services) with a due date:  Health Maintenance Due   Topic Date Due    COVID-19 Vaccine (1) Never done    DTaP/Tdap/Td  (1 - Tdap) Never done    Shingles Vaccine (1 of 2) Never done    Smoker or Former Smoker - [address]  Never done    Pneumococcal Vaccine (2 - PPSV23 if available, else PCV20) 08/10/2017    Mammogram  08/07/2020    Eye Exam  03/08/2021    Yearly Flu Vaccine (1) Never done    Colorectal Screening  10/24/2022    Diabetic Foot Care  11/22/2022

## 2022-11-21 NOTE — ACP (ADVANCE CARE PLANNING)

## 2022-12-14 RX ORDER — OXCARBAZEPINE 300 MG/1
TABLET, FILM COATED ORAL
Qty: 60 TABLET | Refills: 1 | Status: SHIPPED | OUTPATIENT
Start: 2022-12-14

## 2023-01-10 RX ORDER — OXCARBAZEPINE 300 MG/1
TABLET, FILM COATED ORAL
Qty: 60 TABLET | Refills: 1 | Status: SHIPPED | OUTPATIENT
Start: 2023-01-10

## 2023-01-21 ENCOUNTER — APPOINTMENT (OUTPATIENT)
Dept: GENERAL RADIOLOGY | Age: 68
End: 2023-01-21
Attending: EMERGENCY MEDICINE
Payer: MEDICARE

## 2023-01-21 ENCOUNTER — HOSPITAL ENCOUNTER (EMERGENCY)
Age: 68
Discharge: HOME | End: 2023-01-21
Attending: EMERGENCY MEDICINE
Payer: MEDICARE

## 2023-01-21 VITALS
SYSTOLIC BLOOD PRESSURE: 128 MMHG | BODY MASS INDEX: 26.98 KG/M2 | DIASTOLIC BLOOD PRESSURE: 73 MMHG | WEIGHT: 158 LBS | TEMPERATURE: 98.4 F | OXYGEN SATURATION: 98 % | RESPIRATION RATE: 20 BRPM | HEIGHT: 64 IN | HEART RATE: 72 BPM

## 2023-01-21 DIAGNOSIS — J44.1 COPD EXACERBATION (HCC): Primary | ICD-10-CM

## 2023-01-21 LAB
ANION GAP SERPL CALC-SCNC: 12 MMOL/L (ref 5–15)
ATRIAL RATE: 65 BPM
BASOPHILS # BLD: 0 K/UL (ref 0–0.1)
BASOPHILS NFR BLD: 1 % (ref 0–1)
BUN SERPL-MCNC: 6 MG/DL (ref 6–20)
BUN/CREAT SERPL: 7 (ref 12–20)
CALCIUM SERPL-MCNC: 8.7 MG/DL (ref 8.5–10.1)
CALCULATED P AXIS, ECG09: 41 DEGREES
CALCULATED R AXIS, ECG10: -30 DEGREES
CALCULATED T AXIS, ECG11: 111 DEGREES
CHLORIDE SERPL-SCNC: 96 MMOL/L (ref 97–108)
CO2 SERPL-SCNC: 23 MMOL/L (ref 21–32)
CREAT SERPL-MCNC: 0.82 MG/DL (ref 0.55–1.02)
DIAGNOSIS, 93000: NORMAL
DIFFERENTIAL METHOD BLD: ABNORMAL
EOSINOPHIL # BLD: 0.1 K/UL (ref 0–0.4)
EOSINOPHIL NFR BLD: 3 % (ref 0–7)
ERYTHROCYTE [DISTWIDTH] IN BLOOD BY AUTOMATED COUNT: 12.7 % (ref 11.5–14.5)
GLUCOSE SERPL-MCNC: 167 MG/DL (ref 65–100)
HCT VFR BLD AUTO: 48.3 % (ref 35–47)
HGB BLD-MCNC: 16.6 G/DL (ref 11.5–16)
IMM GRANULOCYTES # BLD AUTO: 0 K/UL (ref 0–0.04)
IMM GRANULOCYTES NFR BLD AUTO: 0 % (ref 0–0.5)
LYMPHOCYTES # BLD: 1 K/UL (ref 0.8–3.5)
LYMPHOCYTES NFR BLD: 20 % (ref 12–49)
MCH RBC QN AUTO: 33.9 PG (ref 26–34)
MCHC RBC AUTO-ENTMCNC: 34.4 G/DL (ref 30–36.5)
MCV RBC AUTO: 98.6 FL (ref 80–99)
MONOCYTES # BLD: 0.6 K/UL (ref 0–1)
MONOCYTES NFR BLD: 12 % (ref 5–13)
NEUTS SEG # BLD: 3.2 K/UL (ref 1.8–8)
NEUTS SEG NFR BLD: 64 % (ref 32–75)
NRBC # BLD: 0 K/UL (ref 0–0.01)
NRBC BLD-RTO: 0 PER 100 WBC
P-R INTERVAL, ECG05: 154 MS
PLATELET # BLD AUTO: 314 K/UL (ref 150–400)
PMV BLD AUTO: 8.5 FL (ref 8.9–12.9)
POTASSIUM SERPL-SCNC: 3.5 MMOL/L (ref 3.5–5.1)
Q-T INTERVAL, ECG07: 444 MS
QRS DURATION, ECG06: 138 MS
QTC CALCULATION (BEZET), ECG08: 461 MS
RBC # BLD AUTO: 4.9 M/UL (ref 3.8–5.2)
SODIUM SERPL-SCNC: 131 MMOL/L (ref 136–145)
TROPONIN-HIGH SENSITIVITY: 8 NG/L (ref 0–51)
VENTRICULAR RATE, ECG03: 65 BPM
WBC # BLD AUTO: 5 K/UL (ref 3.6–11)

## 2023-01-21 PROCEDURE — 84484 ASSAY OF TROPONIN QUANT: CPT

## 2023-01-21 PROCEDURE — 80048 BASIC METABOLIC PNL TOTAL CA: CPT

## 2023-01-21 PROCEDURE — 77030029684 HC NEB SM VOL KT MONA -A

## 2023-01-21 PROCEDURE — 74011250636 HC RX REV CODE- 250/636: Performed by: EMERGENCY MEDICINE

## 2023-01-21 PROCEDURE — 85025 COMPLETE CBC W/AUTO DIFF WBC: CPT

## 2023-01-21 PROCEDURE — 99285 EMERGENCY DEPT VISIT HI MDM: CPT

## 2023-01-21 PROCEDURE — 94640 AIRWAY INHALATION TREATMENT: CPT

## 2023-01-21 PROCEDURE — 36415 COLL VENOUS BLD VENIPUNCTURE: CPT

## 2023-01-21 PROCEDURE — 74011000250 HC RX REV CODE- 250: Performed by: EMERGENCY MEDICINE

## 2023-01-21 PROCEDURE — 71045 X-RAY EXAM CHEST 1 VIEW: CPT

## 2023-01-21 PROCEDURE — 96374 THER/PROPH/DIAG INJ IV PUSH: CPT

## 2023-01-21 RX ORDER — IPRATROPIUM BROMIDE AND ALBUTEROL SULFATE 2.5; .5 MG/3ML; MG/3ML
3 SOLUTION RESPIRATORY (INHALATION)
Status: COMPLETED | OUTPATIENT
Start: 2023-01-21 | End: 2023-01-21

## 2023-01-21 RX ORDER — SODIUM CHLORIDE 0.9 % (FLUSH) 0.9 %
5-40 SYRINGE (ML) INJECTION EVERY 8 HOURS
Status: DISCONTINUED | OUTPATIENT
Start: 2023-01-21 | End: 2023-01-21 | Stop reason: HOSPADM

## 2023-01-21 RX ORDER — PREDNISONE 20 MG/1
60 TABLET ORAL DAILY
Qty: 15 TABLET | Refills: 0 | Status: SHIPPED | OUTPATIENT
Start: 2023-01-21 | End: 2023-01-26

## 2023-01-21 RX ORDER — ALBUTEROL SULFATE 90 UG/1
2 AEROSOL, METERED RESPIRATORY (INHALATION)
Qty: 1 EACH | Refills: 0 | Status: SHIPPED | OUTPATIENT
Start: 2023-01-21 | End: 2023-01-28

## 2023-01-21 RX ADMIN — IPRATROPIUM BROMIDE AND ALBUTEROL SULFATE 3 ML: 2.5; .5 SOLUTION RESPIRATORY (INHALATION) at 09:13

## 2023-01-21 RX ADMIN — METHYLPREDNISOLONE SODIUM SUCCINATE 125 MG: 125 INJECTION, POWDER, FOR SOLUTION INTRAMUSCULAR; INTRAVENOUS at 08:09

## 2023-01-21 RX ADMIN — SODIUM CHLORIDE, PRESERVATIVE FREE 10 ML: 5 INJECTION INTRAVENOUS at 08:10

## 2023-01-21 RX ADMIN — IPRATROPIUM BROMIDE AND ALBUTEROL SULFATE 3 ML: 2.5; .5 SOLUTION RESPIRATORY (INHALATION) at 08:07

## 2023-01-21 NOTE — ED PROVIDER NOTES
EMERGENCY DEPARTMENT HISTORY AND PHYSICAL EXAM      Date: 1/21/2023  Patient Name: Lionel Pete    History of Presenting Illness     Chief Complaint   Patient presents with    Shortness of Breath       History Provided By: Patient    HPI: Lionel Pete, 69 y.o. female presents to the ED with cc of shortness of breath, wheezing and cough for the past 5 days. Patient has a history of COPD, does not have a nebulizer machine at home. There are no other associated symptoms, patient concerns, or physical findings at this time. I reviewed the vital signs, available nursing notes, past medical history, past surgical history, family history and social history. Vital Signs:  No data found. Vital signs reviewed. Current Medications:  No current facility-administered medications on file prior to encounter. Current Outpatient Medications on File Prior to Encounter   Medication Sig Dispense Refill    OXcarbazepine (TRILEPTAL) 300 mg tablet TAKE 2 TABLETS BY MOUTH AT BEDTIME 60 Tablet 1    losartan (COZAAR) 100 mg tablet Take 1 Tablet by mouth daily. 90 Tablet 1    metoprolol tartrate (LOPRESSOR) 50 mg tablet Take 1 Tablet by mouth daily. 90 Tablet 1    ergocalciferol (ERGOCALCIFEROL) 1,250 mcg (50,000 unit) capsule Take 1 Capsule by mouth every seven (7) days for 12 doses. 4 Capsule 2    simvastatin (ZOCOR) 20 mg tablet TAKE 1 TABLET BY MOUTH EVERY DAY AT NIGHT 90 Tablet 0    buPROPion XL (WELLBUTRIN XL) 150 mg tablet Take 1 Tablet by mouth daily. 90 Tablet 1    umeclidinium-vilanteroL (ANORO ELLIPTA) 62.5-25 mcg/actuation inhaler Take 1 Puff by inhalation daily. DC spiriva 1 Each 1    ammonium lactate (LAC-HYDRIN) 12 % lotion rub in to affected area well 400 mL 2    diclofenac (VOLTAREN) 1 % gel Top  g 1    FLUoxetine (PROzac) 20 mg capsule TAKE 1 CAPSULE BY MOUTH EVERY DAY IN THE MORNING 90 Capsule 1    amLODIPine (NORVASC) 10 mg tablet Take 1 Tablet by mouth daily.  90 Tablet 1    metFORMIN (GLUCOPHAGE) 500 mg tablet Take 1 Tablet by mouth two (2) times daily (with meals). 180 Tablet 1    Blood-Glucose Meter monitoring kit Check sugar BID. DX:DM type2 1 Kit 0    glucose blood VI test strips (OneTouch Ultra Test) strip USE TO CHECK BLOOD SUGAR ONCE DAILY 100 Strip 3    lancets misc Check blood sugar one time a day 100 Each 4    albuterol (PROVENTIL HFA, VENTOLIN HFA, PROAIR HFA) 90 mcg/actuation inhaler Take 2 Puffs by inhalation every four (4) hours as needed for Wheezing. 1 Each 2    carbamide peroxide (DEBROX) 6.5 % otic solution Administer 5 Drops into each ear two (2) times a day. 30 mL 0    fluticasone propionate (FLONASE) 50 mcg/actuation nasal spray 2 Sprays by Both Nostrils route daily. 1 Bottle 0    Nebulizer & Compressor machine 1 Each by Does Not Apply route every four (4) hours as needed for Wheezing. As directed 1 Each 0    guaiFENesin 200 mg/5 mL liqd Take 10 mL by mouth four (4) times daily as needed for Cough. 120 mL 0    calcium-vitamin D (OYSTER SHELL) 500 mg(1,250mg) -200 unit per tablet Take 1 Tab by mouth two (2) times daily (with meals). 180 Tab 4    cholecalciferol, vitamin D3, 50 mcg (2,000 unit) tab Take  by mouth. famotidine (PEPCID) 20 mg tablet Take 1 Tab by mouth nightly. 30 Tab 0    aspirin delayed-release 81 mg tablet Take  by mouth daily.          Past History     Past Medical History:  Past Medical History:   Diagnosis Date    COPD     early stage of emphysema    Depression     bipolar disease    Diabetes ([facility] [address]     Diabetes ([facility] [address]     for 15 years    Hypercholesterolemia     Hypertension     LBBB (left bundle branch block)     Psychiatric disorder     BIpolar    Renal cyst, right     left kidney in pelvis    Thyroid disease     thyroid nodule await for biopsy       Past Surgical History:  Past Surgical History:   Procedure Laterality Date    HX BREAST BIOPSY Right     cyst removed at age 22    SC BREAST SURGERY PROCEDURE UNLISTED      breast cyst removed from left breast       Family History:  Family History   Problem Relation Age of Onset    Diabetes Mother     Hypertension Mother     Elevated Lipids Mother     Cancer Mother         breast ac    Breast Cancer Mother     Cancer Father         tongue ca    Diabetes Sister     Breast Cancer Sister     Heart Disease Brother        Social History:  Social History     Tobacco Use    Smoking status: Every Day     Packs/day: 1.00     Years: 30.00     Pack years: 30.00     Types: Cigarettes    Smokeless tobacco: Never   Substance Use Topics    Alcohol use: Yes     Comment: 1 beer per week. Drug use: No       Allergies: Allergies   Allergen Reactions    Contrast Agent [Iodine] Anaphylaxis    Pcn [Penicillins] Rash    Pcn [Penicillins] Hives         Review of Systems   Review of Systems   Constitutional:  Negative for fever. HENT:  Positive for congestion. Negative for sore throat. Eyes:  Negative for photophobia and redness. Respiratory:  Positive for cough, shortness of breath and wheezing. Cardiovascular:  Negative for chest pain and leg swelling. Gastrointestinal:  Negative for abdominal pain, blood in stool, nausea and vomiting. Genitourinary:  Negative for difficulty urinating, dysuria, hematuria, menstrual problem and vaginal bleeding. Musculoskeletal:  Negative for back pain and joint swelling. Neurological:  Negative for dizziness, seizures, syncope, speech difficulty, weakness, numbness and headaches. Hematological:  Negative for adenopathy. Psychiatric/Behavioral:  Negative for agitation, confusion and suicidal ideas. The patient is not nervous/anxious. All other systems reviewed and are negative. Physical Exam   Physical Exam  Vitals and nursing note reviewed. Constitutional:       General: She is in acute distress. Appearance: She is well-developed. Comments: Patient is in mild respiratory distress, speaking in full sentences. HENT:      Head: Normocephalic and atraumatic. Mouth/Throat:      Pharynx: No oropharyngeal exudate. Eyes:      General:         Left eye: No discharge. Extraocular Movements: Extraocular movements intact. Conjunctiva/sclera: Conjunctivae normal.      Pupils: Pupils are equal, round, and reactive to light. Neck:      Vascular: No JVD. Cardiovascular:      Rate and Rhythm: Regular rhythm. Tachycardia present. Heart sounds: Normal heart sounds. Pulmonary:      Effort: Pulmonary effort is normal. No respiratory distress. Breath sounds: Examination of the right-middle field reveals rhonchi. Examination of the left-middle field reveals rhonchi. Examination of the right-lower field reveals wheezing. Examination of the left-lower field reveals wheezing. Wheezing and rhonchi present. Abdominal:      General: Bowel sounds are normal. There is no distension. Palpations: Abdomen is soft. Tenderness: There is no abdominal tenderness. There is no guarding or rebound. Musculoskeletal:         General: No tenderness. Normal range of motion. Cervical back: Normal range of motion and neck supple. Lymphadenopathy:      Cervical: No cervical adenopathy. Skin:     General: Skin is warm and dry. Findings: No rash. Neurological:      Mental Status: She is alert and oriented to person, place, and time. Cranial Nerves: No cranial nerve deficit. Deep Tendon Reflexes: Reflexes are normal and symmetric. Psychiatric:         Behavior: Behavior normal.       Emergency Department Course   ED Course:  Initial assessment performed. The patient's complaints have been discussed, and they are in agreement with the care plan formulated and outlined with them. I have encouraged them to ask questions as they arise throughout their visit. EKG interpretation: (Preliminary)  Rhythm: normal sinus rhythm; and regular .  Rate (approx.): 65; Axis: left axis deviation; OR interval: normal; QRS interval: normal ; ST/T wave: normal; Other findings: left ventricular hypertrophy. EKG read and interpreted by Joseph Cuadra MD     Medical Decision Making:  COPD exacerbation, bronchitis, pneumonia, COVID-19 infection, influenza, CHF. Critical Care Time:      Procedure:      Progress note:   Time:    Disposition:  DISCHARGED at 10:05 am,  I reviewed exam findings, diagnostic results, and clinical impression with patient. Counseled patient on diagnosis and care plan. Encouraged patient to ask questions and discussed need for follow up with primary care and to return to ED precautions. Patient expresses understanding at this time. I have reviewed discharge instructions with the patient and/or family/caregiver who verbalized understanding. The patient has been re-evaluated and is ready for discharge. Discharge instructions have been provided and explained to the patient. Ready for discharge. DISCHARGE PLAN:  1. Current Discharge Medication List        2. Follow-up Information    None       3. Return to ED if current symptoms worsen or new symptoms arise. 4. Follow up with Lupe Serrano MD in 3-5 days. Diagnosis     Clinical Impression: No diagnosis found.

## 2023-02-03 RX ORDER — OXCARBAZEPINE 300 MG/1
TABLET, FILM COATED ORAL
Qty: 60 TABLET | Refills: 1 | Status: SHIPPED | OUTPATIENT
Start: 2023-02-03

## 2023-02-15 DIAGNOSIS — E78.00 PURE HYPERCHOLESTEROLEMIA: ICD-10-CM

## 2023-02-16 RX ORDER — SIMVASTATIN 20 MG/1
20 TABLET, FILM COATED ORAL
Qty: 90 TABLET | Refills: 0 | Status: SHIPPED | OUTPATIENT
Start: 2023-02-16

## 2023-03-21 ENCOUNTER — HOSPITAL ENCOUNTER (EMERGENCY)
Age: 68
Discharge: HOME | End: 2023-03-21
Attending: STUDENT IN AN ORGANIZED HEALTH CARE EDUCATION/TRAINING PROGRAM
Payer: COMMERCIAL

## 2023-03-21 ENCOUNTER — APPOINTMENT (OUTPATIENT)
Dept: GENERAL RADIOLOGY | Age: 68
End: 2023-03-21
Attending: STUDENT IN AN ORGANIZED HEALTH CARE EDUCATION/TRAINING PROGRAM
Payer: COMMERCIAL

## 2023-03-21 ENCOUNTER — APPOINTMENT (OUTPATIENT)
Dept: CT IMAGING | Age: 68
End: 2023-03-21
Attending: STUDENT IN AN ORGANIZED HEALTH CARE EDUCATION/TRAINING PROGRAM
Payer: COMMERCIAL

## 2023-03-21 VITALS
HEIGHT: 64 IN | SYSTOLIC BLOOD PRESSURE: 131 MMHG | TEMPERATURE: 98.2 F | DIASTOLIC BLOOD PRESSURE: 65 MMHG | HEART RATE: 72 BPM | RESPIRATION RATE: 18 BRPM | OXYGEN SATURATION: 98 % | BODY MASS INDEX: 27.31 KG/M2 | WEIGHT: 160 LBS

## 2023-03-21 DIAGNOSIS — R10.9 RIGHT FLANK PAIN: ICD-10-CM

## 2023-03-21 DIAGNOSIS — E27.8 ADRENAL NODULE (HCC): Primary | ICD-10-CM

## 2023-03-21 DIAGNOSIS — J44.9 CHRONIC OBSTRUCTIVE PULMONARY DISEASE, UNSPECIFIED COPD TYPE (HCC): ICD-10-CM

## 2023-03-21 DIAGNOSIS — J44.1 COPD EXACERBATION (HCC): ICD-10-CM

## 2023-03-21 DIAGNOSIS — J43.9 PULMONARY EMPHYSEMA, UNSPECIFIED EMPHYSEMA TYPE (HCC): ICD-10-CM

## 2023-03-21 LAB
ALBUMIN SERPL-MCNC: 3.3 G/DL (ref 3.5–5)
ALBUMIN/GLOB SERPL: 0.8 (ref 1.1–2.2)
ALP SERPL-CCNC: 87 U/L (ref 45–117)
ALT SERPL-CCNC: 16 U/L (ref 12–78)
ANION GAP SERPL CALC-SCNC: 13 MMOL/L (ref 5–15)
AST SERPL-CCNC: 13 U/L (ref 15–37)
BASOPHILS # BLD: 0.1 K/UL (ref 0–0.1)
BASOPHILS NFR BLD: 1 % (ref 0–1)
BILIRUB SERPL-MCNC: 0.2 MG/DL (ref 0.2–1)
BNP SERPL-MCNC: 190 PG/ML (ref 0–125)
BUN SERPL-MCNC: 4 MG/DL (ref 6–20)
BUN/CREAT SERPL: 7 (ref 12–20)
CALCIUM SERPL-MCNC: 8.9 MG/DL (ref 8.5–10.1)
CHLORIDE SERPL-SCNC: 92 MMOL/L (ref 97–108)
CO2 SERPL-SCNC: 24 MMOL/L (ref 21–32)
CREAT SERPL-MCNC: 0.6 MG/DL (ref 0.55–1.02)
DIFFERENTIAL METHOD BLD: ABNORMAL
EOSINOPHIL # BLD: 0.2 K/UL (ref 0–0.4)
EOSINOPHIL NFR BLD: 2 % (ref 0–7)
ERYTHROCYTE [DISTWIDTH] IN BLOOD BY AUTOMATED COUNT: 14 % (ref 11.5–14.5)
GLOBULIN SER CALC-MCNC: 3.9 G/DL (ref 2–4)
GLUCOSE SERPL-MCNC: 122 MG/DL (ref 65–100)
HCT VFR BLD AUTO: 43.2 % (ref 35–47)
HGB BLD-MCNC: 15.2 G/DL (ref 11.5–16)
IMM GRANULOCYTES # BLD AUTO: 0 K/UL (ref 0–0.04)
IMM GRANULOCYTES NFR BLD AUTO: 1 % (ref 0–0.5)
LYMPHOCYTES # BLD: 1.4 K/UL (ref 0.8–3.5)
LYMPHOCYTES NFR BLD: 17 % (ref 12–49)
MCH RBC QN AUTO: 34.9 PG (ref 26–34)
MCHC RBC AUTO-ENTMCNC: 35.2 G/DL (ref 30–36.5)
MCV RBC AUTO: 99.1 FL (ref 80–99)
MONOCYTES # BLD: 0.9 K/UL (ref 0–1)
MONOCYTES NFR BLD: 11 % (ref 5–13)
NEUTS SEG # BLD: 5.9 K/UL (ref 1.8–8)
NEUTS SEG NFR BLD: 68 % (ref 32–75)
NRBC # BLD: 0 K/UL (ref 0–0.01)
NRBC BLD-RTO: 0 PER 100 WBC
PLATELET # BLD AUTO: 319 K/UL (ref 150–400)
PMV BLD AUTO: 8.7 FL (ref 8.9–12.9)
POTASSIUM SERPL-SCNC: 3.7 MMOL/L (ref 3.5–5.1)
PROT SERPL-MCNC: 7.2 G/DL (ref 6.4–8.2)
RBC # BLD AUTO: 4.36 M/UL (ref 3.8–5.2)
SODIUM SERPL-SCNC: 129 MMOL/L (ref 136–145)
TROPONIN I SERPL HS-MCNC: 7 NG/L (ref 0–51)
WBC # BLD AUTO: 8.5 K/UL (ref 3.6–11)

## 2023-03-21 PROCEDURE — 96374 THER/PROPH/DIAG INJ IV PUSH: CPT

## 2023-03-21 PROCEDURE — 77030029684 HC NEB SM VOL KT MONA -A

## 2023-03-21 PROCEDURE — 83880 ASSAY OF NATRIURETIC PEPTIDE: CPT

## 2023-03-21 PROCEDURE — 85025 COMPLETE CBC W/AUTO DIFF WBC: CPT

## 2023-03-21 PROCEDURE — 71250 CT THORAX DX C-: CPT

## 2023-03-21 PROCEDURE — 99285 EMERGENCY DEPT VISIT HI MDM: CPT

## 2023-03-21 PROCEDURE — 74011000250 HC RX REV CODE- 250: Performed by: STUDENT IN AN ORGANIZED HEALTH CARE EDUCATION/TRAINING PROGRAM

## 2023-03-21 PROCEDURE — 94640 AIRWAY INHALATION TREATMENT: CPT

## 2023-03-21 PROCEDURE — 94760 N-INVAS EAR/PLS OXIMETRY 1: CPT

## 2023-03-21 PROCEDURE — 71045 X-RAY EXAM CHEST 1 VIEW: CPT

## 2023-03-21 PROCEDURE — 93005 ELECTROCARDIOGRAM TRACING: CPT

## 2023-03-21 PROCEDURE — 36415 COLL VENOUS BLD VENIPUNCTURE: CPT

## 2023-03-21 PROCEDURE — 74176 CT ABD & PELVIS W/O CONTRAST: CPT

## 2023-03-21 PROCEDURE — 80053 COMPREHEN METABOLIC PANEL: CPT

## 2023-03-21 PROCEDURE — 74011250636 HC RX REV CODE- 250/636: Performed by: STUDENT IN AN ORGANIZED HEALTH CARE EDUCATION/TRAINING PROGRAM

## 2023-03-21 PROCEDURE — 84484 ASSAY OF TROPONIN QUANT: CPT

## 2023-03-21 PROCEDURE — 74011250637 HC RX REV CODE- 250/637: Performed by: STUDENT IN AN ORGANIZED HEALTH CARE EDUCATION/TRAINING PROGRAM

## 2023-03-21 PROCEDURE — 74011636637 HC RX REV CODE- 636/637: Performed by: STUDENT IN AN ORGANIZED HEALTH CARE EDUCATION/TRAINING PROGRAM

## 2023-03-21 RX ORDER — ALBUTEROL SULFATE 90 UG/1
2 AEROSOL, METERED RESPIRATORY (INHALATION)
Qty: 1 EACH | Refills: 2 | Status: SHIPPED | OUTPATIENT
Start: 2023-03-21

## 2023-03-21 RX ORDER — IPRATROPIUM BROMIDE AND ALBUTEROL SULFATE 2.5; .5 MG/3ML; MG/3ML
3 SOLUTION RESPIRATORY (INHALATION)
Status: COMPLETED | OUTPATIENT
Start: 2023-03-21 | End: 2023-03-21

## 2023-03-21 RX ORDER — ACETAMINOPHEN 500 MG
1000 TABLET ORAL ONCE
Status: COMPLETED | OUTPATIENT
Start: 2023-03-21 | End: 2023-03-21

## 2023-03-21 RX ORDER — OXYCODONE HYDROCHLORIDE 5 MG/1
5 TABLET ORAL
Status: COMPLETED | OUTPATIENT
Start: 2023-03-21 | End: 2023-03-21

## 2023-03-21 RX ORDER — PREDNISONE 20 MG/1
60 TABLET ORAL
Status: DISCONTINUED | OUTPATIENT
Start: 2023-03-22 | End: 2023-03-21

## 2023-03-21 RX ORDER — PREDNISONE 20 MG/1
60 TABLET ORAL
Status: COMPLETED | OUTPATIENT
Start: 2023-03-21 | End: 2023-03-21

## 2023-03-21 RX ORDER — KETOROLAC TROMETHAMINE 30 MG/ML
15 INJECTION, SOLUTION INTRAMUSCULAR; INTRAVENOUS
Status: COMPLETED | OUTPATIENT
Start: 2023-03-21 | End: 2023-03-21

## 2023-03-21 RX ORDER — PREDNISONE 10 MG/1
TABLET ORAL
Qty: 21 TABLET | Refills: 0 | Status: SHIPPED | OUTPATIENT
Start: 2023-03-21

## 2023-03-21 RX ORDER — IBUPROFEN 600 MG/1
600 TABLET ORAL
Qty: 20 TABLET | Refills: 0 | Status: SHIPPED | OUTPATIENT
Start: 2023-03-21

## 2023-03-21 RX ADMIN — PREDNISONE 60 MG: 20 TABLET ORAL at 16:36

## 2023-03-21 RX ADMIN — KETOROLAC TROMETHAMINE 15 MG: 30 INJECTION, SOLUTION INTRAMUSCULAR at 16:37

## 2023-03-21 RX ADMIN — ACETAMINOPHEN 1000 MG: 500 TABLET ORAL at 20:01

## 2023-03-21 RX ADMIN — IPRATROPIUM BROMIDE AND ALBUTEROL SULFATE 3 ML: 2.5; .5 SOLUTION RESPIRATORY (INHALATION) at 17:05

## 2023-03-21 RX ADMIN — IPRATROPIUM BROMIDE AND ALBUTEROL SULFATE 3 ML: 2.5; .5 SOLUTION RESPIRATORY (INHALATION) at 16:51

## 2023-03-21 RX ADMIN — OXYCODONE HYDROCHLORIDE 5 MG: 5 TABLET ORAL at 20:01

## 2023-03-21 RX ADMIN — IPRATROPIUM BROMIDE AND ALBUTEROL SULFATE 3 ML: 2.5; .5 SOLUTION RESPIRATORY (INHALATION) at 16:37

## 2023-03-21 NOTE — ED NOTES
Verbal shift change report given to Casper RN (oncoming nurse) by Ena Buenrostro RN (offgoing nurse). Report included the following information SBAR, Kardex, ED Summary, [facility] and Recent Results.

## 2023-03-21 NOTE — ED PROVIDER NOTES
[facility] EMERGENCY DEPT  EMERGENCY DEPARTMENT ENCOUNTER       Pt Name: Donna Gonzales  MRN: [id number]  [place] 1955  Date of evaluation: 3/21/2023  Provider: Mingo Luevano MD   PCP: Debo Ma MD  Note Started: 4:23 PM 3/21/23     CHIEF COMPLAINT       Chief Complaint   Patient presents with    Shortness of Breath        HISTORY OF PRESENT ILLNESS: 1 or more elements      History From: Patient, History limited by: none     Donna Gonzales is a 69 y.o. female presenting with shortness of breath and chest pain. She notes she has been short of breath approximate 2 months but having some right flank/chest pain for approximately 2 to 3 days. She notes the pain is constant. Has not taken anything for it. Notes she continues to smoke and has history of COPD but ran of her inhaler sometime ago. Denies any central chest pain but just notes the pain is all over the right flank area and right side of her body. Denies any headache, dizziness, fever, nausea, vomiting, dysuria, constipation or diarrhea. Please See MDM for Additional Details of the HPI/PMH  Nursing Notes were all reviewed and agreed with or any disagreements were addressed in the HPI. REVIEW OF SYSTEMS        Positives and Pertinent negatives as per HPI.     PAST HISTORY     Past Medical History:  Past Medical History:   Diagnosis Date    COPD     early stage of emphysema    Depression     bipolar disease    Diabetes ([address]     Diabetes ([address]     for 15 years    Hypercholesterolemia     Hypertension     LBBB (left bundle branch block)     Psychiatric disorder     BIpolar    Renal cyst, right     left kidney in pelvis    Thyroid disease     thyroid nodule await for biopsy       Past Surgical History:  Past Surgical History:   Procedure Laterality Date    HX BREAST BIOPSY Right     cyst removed at age 22    SD UNLISTED PROCEDURE BREAST      breast cyst removed from left breast       Family History:  Family History   Problem Relation Age of Onset    Diabetes Mother     Hypertension Mother     Elevated Lipids Mother     Cancer Mother         breast ac    Breast Cancer Mother     Cancer Father         tongue ca    Diabetes Sister     Breast Cancer Sister     Heart Disease Brother        Social History:  Social History     Tobacco Use    Smoking status: Every Day     Packs/day: 1.00     Years: 30.00     Pack years: 30.00     Types: Cigarettes    Smokeless tobacco: Never   Substance Use Topics    Alcohol use: Yes     Comment: 1 beer per week. Drug use: No       Allergies: Allergies   Allergen Reactions    Contrast Agent [Iodine] Anaphylaxis    Pcn [Penicillins] Rash    Pcn [Penicillins] Hives       CURRENT MEDICATIONS      Previous Medications    AMLODIPINE (NORVASC) 10 MG TABLET    Take 1 Tablet by mouth daily. AMMONIUM LACTATE (LAC-HYDRIN) 12 % LOTION    rub in to affected area well    ASPIRIN DELAYED-RELEASE 81 MG TABLET    Take  by mouth daily. BLOOD-GLUCOSE METER MONITORING KIT    Check sugar BID. DX:DM type2    BUPROPION XL (WELLBUTRIN XL) 150 MG TABLET    Take 1 Tablet by mouth daily. CALCIUM-VITAMIN D (OYSTER SHELL) 500 MG(1,250MG) -200 UNIT PER TABLET    Take 1 Tab by mouth two (2) times daily (with meals). CARBAMIDE PEROXIDE (DEBROX) 6.5 % OTIC SOLUTION    Administer 5 Drops into each ear two (2) times a day. CHOLECALCIFEROL, VITAMIN D3, 50 MCG (2,000 UNIT) TAB    Take  by mouth. DICLOFENAC (VOLTAREN) 1 % GEL    Top BID    FAMOTIDINE (PEPCID) 20 MG TABLET    Take 1 Tab by mouth nightly. FLUOXETINE (PROZAC) 20 MG CAPSULE    TAKE 1 CAPSULE BY MOUTH EVERY DAY IN THE MORNING    FLUTICASONE PROPIONATE (FLONASE) 50 MCG/ACTUATION NASAL SPRAY    2 Sprays by Both Nostrils route daily. GLUCOSE BLOOD VI TEST STRIPS (ONETOUCH ULTRA TEST) STRIP    USE TO CHECK BLOOD SUGAR ONCE DAILY    GUAIFENESIN 200 MG/5 ML LIQD    Take 10 mL by mouth four (4) times daily as needed for Cough.     LANCETS MISC    Check blood sugar one time a day LOSARTAN (COZAAR) 100 MG TABLET    Take 1 Tablet by mouth daily. METFORMIN (GLUCOPHAGE) 500 MG TABLET    Take 1 Tablet by mouth two (2) times daily (with meals). METOPROLOL TARTRATE (LOPRESSOR) 50 MG TABLET    Take 1 Tablet by mouth daily. NEBULIZER & COMPRESSOR MACHINE    1 Each by Does Not Apply route every four (4) hours as needed for Wheezing. As directed    OXCARBAZEPINE (TRILEPTAL) 300 MG TABLET    TAKE 2 TABLETS BY MOUTH AT BEDTIME    SIMVASTATIN (ZOCOR) 20 MG TABLET    Take 1 Tablet by mouth nightly. UMECLIDINIUM-VILANTEROL (ANORO ELLIPTA) 62.5-25 MCG/ACTUATION INHALER    Take 1 Puff by inhalation daily. DC spiriva       SCREENINGS               No data recorded         PHYSICAL EXAM      ED Triage Vitals [03/21/23 1606]   ED Encounter Vitals Group      /70      Pulse (Heart Rate) 69      Resp Rate 16      Temp 98.2 °F (36.8 °C)      Temp src       O2 Sat (%) 96 %      Weight 160 lb      Height 5' 4\"        Physical Exam  Vitals and nursing note reviewed. Constitutional:       Appearance: She is well-developed. HENT:      Head: Normocephalic and atraumatic. Cardiovascular:      Rate and Rhythm: Normal rate and regular rhythm. Pulmonary:      Breath sounds: Wheezing present. Chest:      Chest wall: No tenderness or edema. Musculoskeletal:      Right lower leg: Edema present. Left lower leg: Edema present. Skin:     General: Skin is warm. Capillary Refill: Capillary refill takes less than 2 seconds. Neurological:      General: No focal deficit present. Mental Status: She is alert and oriented to person, place, and time. Psychiatric:         Mood and Affect: Mood is anxious.         DIAGNOSTIC RESULTS   LABS:     Recent Results (from the past 12 hour(s))   CBC WITH AUTOMATED DIFF    Collection Time: 03/21/23  4:29 PM   Result Value Ref Range    WBC 8.5 3.6 - 11.0 K/uL    RBC 4.36 3.80 - 5.20 M/uL    HGB 15.2 11.5 - 16.0 g/dL    HCT 43.2 35.0 - 47.0 %    MCV 99.1 (H) 80.0 - 99.0 FL    MCH 34.9 (H) 26.0 - 34.0 PG    MCHC 35.2 30.0 - 36.5 g/dL    RDW 14.0 11.5 - 14.5 %    PLATELET 430 451 - 658 K/uL    MPV 8.7 (L) 8.9 - 12.9 FL    NRBC 0.0 0  WBC    ABSOLUTE NRBC 0.00 0.00 - 0.01 K/uL    NEUTROPHILS 68 32 - 75 %    LYMPHOCYTES 17 12 - 49 %    MONOCYTES 11 5 - 13 %    EOSINOPHILS 2 0 - 7 %    BASOPHILS 1 0 - 1 %    IMMATURE GRANULOCYTES 1 (H) 0.0 - 0.5 %    ABS. NEUTROPHILS 5.9 1.8 - 8.0 K/UL    ABS. LYMPHOCYTES 1.4 0.8 - 3.5 K/UL    ABS. MONOCYTES 0.9 0.0 - 1.0 K/UL    ABS. EOSINOPHILS 0.2 0.0 - 0.4 K/UL    ABS. BASOPHILS 0.1 0.0 - 0.1 K/UL    ABS. IMM. GRANS. 0.0 0.00 - 0.04 K/UL    DF AUTOMATED     METABOLIC PANEL, COMPREHENSIVE    Collection Time: 03/21/23  4:29 PM   Result Value Ref Range    Sodium 129 (L) 136 - 145 mmol/L    Potassium 3.7 3.5 - 5.1 mmol/L    Chloride 92 (L) 97 - 108 mmol/L    CO2 24 21 - 32 mmol/L    Anion gap 13 5 - 15 mmol/L    Glucose 122 (H) 65 - 100 mg/dL    BUN 4 (L) 6 - 20 MG/DL    Creatinine 0.60 0.55 - 1.02 MG/DL    BUN/Creatinine ratio 7 (L) 12 - 20      eGFR >60 >60 ml/min/1.73m2    Calcium 8.9 8.5 - 10.1 MG/DL    Bilirubin, total 0.2 0.2 - 1.0 MG/DL    ALT (SGPT) 16 12 - 78 U/L    AST (SGOT) 13 (L) 15 - 37 U/L    Alk.  phosphatase 87 45 - 117 U/L    Protein, total 7.2 6.4 - 8.2 g/dL    Albumin 3.3 (L) 3.5 - 5.0 g/dL    Globulin 3.9 2.0 - 4.0 g/dL    A-G Ratio 0.8 (L) 1.1 - 2.2     NT-PRO BNP    Collection Time: 03/21/23  4:29 PM   Result Value Ref Range    NT pro- (H) 0 - 125 PG/ML   TROPONIN-HIGH SENSITIVITY    Collection Time: 03/21/23  4:29 PM   Result Value Ref Range    Troponin-High Sensitivity 7 0 - 51 ng/L   EKG, 12 LEAD, INITIAL    Collection Time: 03/21/23  4:52 PM   Result Value Ref Range    Ventricular Rate 67 BPM    Atrial Rate 67 BPM    P-R Interval 144 ms    QRS Duration 144 ms    Q-T Interval 452 ms    QTC Calculation (Bezet) 477 ms    Calculated P Axis 55 degrees    Calculated R Axis -11 degrees    Calculated T Axis 118 degrees    Diagnosis       Normal sinus rhythm  Left bundle branch block  When compared with ECG of 21-JAN-2023 08:22,  No significant change was found          EKG: If performed, independent interpretation documented below in the MDM section     RADIOLOGY:  Non-plain film images such as CT, Ultrasound and MRI are read by the radiologist. Plain radiographic images are visualized and preliminarily interpreted by the ED Provider with the findings documented in the MDM section. Interpretation per the Radiologist below, if available at the time of this note:     CT CHEST WO CONT    Result Date: 3/21/2023  INDICATION: Right flank pain. Right upper quadrant and right lower chest pain COMPARISON: Earlier same day TECHNIQUE: Unenhanced axial images were obtained through the chest, abdomen, and pelvis. Oral contrast was not administered. Sagittal and coronal reformats were performed. CT dose reduction was achieved through use of a standardized protocol tailored for this examination and automatic exposure control for dose modulation. FINDINGS: THYROID: No nodule. MEDIASTINUM: No mass or lymphadenopathy. STEFANI: No mass or lymphadenopathy. THORACIC AORTA: Post distal aortic stent graft placement. Aorta is not dilated. MAIN PULMONARY ARTERY: Mildly enlarged at 3.1 cm TRACHEA/BRONCHI: Patent. ESOPHAGUS: No wall thickening or dilatation. HEART: Lipoma of the intra-atrial septum. Heart is not enlarged. Mild coronary artery calcification PLEURA: No effusion or pneumothorax. LUNGS: Emphysematous change with upper lobe predominance. No consolidation LIVER: No mass or biliary dilatation. GALLBLADDER: Unremarkable. SPLEEN: No mass. PANCREAS: No mass or ductal dilatation. ADRENALS: 2.9 cm right adrenal nodule measuring -4 Hounsfield units compatible with an adenoma left adrenal gland is not enlarged KIDNEYS: Horseshoe kidney without stone or hydronephrosis STOMACH: Unremarkable. SMALL BOWEL: No dilatation or wall thickening. COLON: Left-sided diverticulosis. No acute diverticulitis APPENDIX: Not identified PERITONEUM: No ascites or pneumoperitoneum. RETROPERITONEUM: No lymphadenopathy or aortic aneurysm. Previous IVC filter placement REPRODUCTIVE ORGANS: Not enlarged URINARY BLADDER: No mass or calculus. BONES: No destructive bone lesion. ADDITIONAL COMMENTS: N/A     1. Aortic stent graft and emphysema. No acute abnormality of the chest 2. Horseshoe kidney and diverticulosis of the left colon. No acute diverticulitis     CT ABD PELV WO CONT    Result Date: 3/21/2023  INDICATION: Right flank pain. Right upper quadrant and right lower chest pain COMPARISON: Earlier same day TECHNIQUE: Unenhanced axial images were obtained through the chest, abdomen, and pelvis. Oral contrast was not administered. Sagittal and coronal reformats were performed. CT dose reduction was achieved through use of a standardized protocol tailored for this examination and automatic exposure control for dose modulation. FINDINGS: THYROID: No nodule. MEDIASTINUM: No mass or lymphadenopathy. STEFANI: No mass or lymphadenopathy. THORACIC AORTA: Post distal aortic stent graft placement. Aorta is not dilated. MAIN PULMONARY ARTERY: Mildly enlarged at 3.1 cm TRACHEA/BRONCHI: Patent. ESOPHAGUS: No wall thickening or dilatation. HEART: Lipoma of the intra-atrial septum. Heart is not enlarged. Mild coronary artery calcification PLEURA: No effusion or pneumothorax. LUNGS: Emphysematous change with upper lobe predominance. No consolidation LIVER: No mass or biliary dilatation. GALLBLADDER: Unremarkable. SPLEEN: No mass. PANCREAS: No mass or ductal dilatation. ADRENALS: 2.9 cm right adrenal nodule measuring -4 Hounsfield units compatible with an adenoma left adrenal gland is not enlarged KIDNEYS: Horseshoe kidney without stone or hydronephrosis STOMACH: Unremarkable. SMALL BOWEL: No dilatation or wall thickening. COLON: Left-sided diverticulosis.  No acute diverticulitis APPENDIX: Not identified PERITONEUM: No ascites or pneumoperitoneum. RETROPERITONEUM: No lymphadenopathy or aortic aneurysm. Previous IVC filter placement REPRODUCTIVE ORGANS: Not enlarged URINARY BLADDER: No mass or calculus. BONES: No destructive bone lesion. ADDITIONAL COMMENTS: N/A     1. Aortic stent graft and emphysema. No acute abnormality of the chest 2. Horseshoe kidney and diverticulosis of the left colon. No acute diverticulitis     XR CHEST PORT    Result Date: 3/21/2023  EXAM: Portable CXR. 1630 hours. COMPARISON: 1/21/2023. INDICATION: SOB, right sided chest rib pain FINDINGS: The lungs appear clear. Heart is normal in size. Descending aortic stent is stable. There is no pulmonary edema. There is no evident pneumothorax or pleural effusion. No acute disease or change. PROCEDURES   Unless otherwise noted below, none  Procedures     CRITICAL CARE TIME   none    EMERGENCY DEPARTMENT COURSE and DIFFERENTIAL DIAGNOSIS/MDM   Vitals:    Vitals:    03/21/23 1606 03/21/23 1636 03/21/23 1705   BP: 139/70     Pulse: 69     Resp: 16     Temp: 98.2 °F (36.8 °C)     SpO2: 96% 94% 98%   Weight: 72.6 kg (160 lb)     Height: 5' 4\" (1.626 m)          Patient was given the following medications:  Medications   albuterol-ipratropium (DUO-NEB) 2.5 MG-0.5 MG/3 ML (3 mL Nebulization Given 3/21/23 1705)   ketorolac (TORADOL) injection 15 mg (15 mg IntraVENous Given 3/21/23 1637)   predniSONE (DELTASONE) tablet 60 mg (60 mg Oral Given 3/21/23 1636)   acetaminophen (TYLENOL) tablet 1,000 mg (1,000 mg Oral Given 3/21/23 2001)   oxyCODONE IR (ROXICODONE) tablet 5 mg (5 mg Oral Given 3/21/23 2001)       Medical Decision Making  67-year-old female with history of diabetes, bipolar, COPD presented with shortness of breath and right flank/chest pain. Vitals on arrival are stable with normal blood pressure reading 16 times a minute at 96% on room air. Exam notable for diffuse wheezing in all aspects of the posterior lung fields. Normal heart exam.  DDx for this includes COPD exacerbation, asthma, pneumonia, reactive airway disease. We will get a chest x-ray to evaluate for any focal abnormalities. Her description of her right flank pain is somewhat odd. When asked to point to the area she continues to point to different regions spanning all the way from her shoulder to her back to her hip to her groin to her buttocks. There is no pain to palpation of any of this area. She is a benign abdominal exam as well. She denies any central chest pain and denies the pain worse with exertion. This could be related to a possible pneumonia or just her COPD exacerbation. Considering PE there is no unilateral leg swelling. She does have 1+ edema in both legs. No signs of DVTs in the past per patient. She does continue to smoke with a history of COPD which is likely worsening her situation. We will start treating for presumed COPD exacerbation with DuoNebs as well as prednisone p.o. We will get labs including troponin and BNP to evaluate for any signs of cardiac ischemia or CHF. EKG will be obtained evaluate for any signs of active ischemia or ischemic changes. Will give Toradol for her pain as well will need reassessment after breathing treatments. Amount and/or Complexity of Data Reviewed  Labs: ordered. Radiology: ordered. ECG/medicine tests: ordered. Risk  OTC drugs. Prescription drug management. ED Course as of 03/21/23 2016   Tue Mar 21, 2023   1846 Reviewed her lab work and imaging. Chest x-ray normal no acute pathology. She has a mild hyponatremia. She says she still having severe right-sided pain. On exam I palpated deep in all quadrants of her abdomen she does not seem to have any intense pain that she points to a deep area in her abdomen. Does have a little bit of pain to her right lower lateral rib cage. Higher suspicion for pneumonia versus hepatic insult or biliary pathology.   Her breathing feels better after the treatments. As such we will proceed with dry scans of her abdomen pelvis and chest to evaluate. [JS]   2012 Reviewed her CT scan in depth. Does have a right adrenal nodule. Unlikely causing her pain but should get followed up for primary care for. Notes her pain is still there with better. Her breathing is still better. Do not think is a PE as she has an IVC filter. No pneumonia noted. No fractures. Likely musculoskeletal possible diaphragmatic from her heavy breathing over the past 2 months. Troponin negative at 7. Sodium mildly low at 129 but seems to be right around her baseline at 130. No signs of intra-abdominal pathology as well. No kidney stones. We will give 5 mg oxycodone and discharged with primary care follow-up. Discussed with the patient she feels comfortable going home. We will also start on steroids. [JS]      ED Course User Index  [JS] Santhosh Milligan MD         FINAL IMPRESSION     1. Adrenal nodule (HCC)    2. Pulmonary emphysema, unspecified emphysema type ([address]    3. COPD exacerbation ([address]    4. Right flank pain    5. Chronic obstructive pulmonary disease, unspecified COPD type ([address]          DISPOSITION/PLAN   Christiano Agarwal's  results have been reviewed with her. She has been counseled regarding her diagnosis, treatment, and plan. She verbally conveys understanding and agreement of the signs, symptoms, diagnosis, treatment and prognosis and additionally agrees to follow up as discussed. She also agrees with the care-plan and conveys that all of her questions have been answered. I have also provided discharge instructions for her that include: educational information regarding their diagnosis and treatment, and list of reasons why they would want to return to the ED prior to their follow-up appointment, should her condition change. CLINICAL IMPRESSION    Discharge Note: The patient is stable for discharge home.  The signs, symptoms, diagnosis, and discharge instructions have been discussed, understanding conveyed, and agreed upon. The patient is to follow up as recommended or return to ER should their symptoms worsen. PATIENT REFERRED TO:  Follow-up Information       Follow up With Specialties Details Why Contact Info    Shannon Zapata MD Internal Medicine Physician In 1 week  [address]  [address] [facility]  [phone]                DISCHARGE MEDICATIONS:  Current Discharge Medication List        START taking these medications    Details   predniSONE (STERAPRED DS) 10 mg dose pack Take as prescribed on package insert  Qty: 21 Tablet, Refills: 0  Start date: 3/21/2023      ibuprofen (MOTRIN) 600 mg tablet Take 1 Tablet by mouth every six (6) hours as needed for Pain. Qty: 20 Tablet, Refills: 0  Start date: 3/21/2023           CONTINUE these medications which have CHANGED    Details   albuterol (PROVENTIL HFA, VENTOLIN HFA, PROAIR HFA) 90 mcg/actuation inhaler Take 2 Puffs by inhalation every four (4) hours as needed for Wheezing. Qty: 1 Each, Refills: 2  Start date: 3/21/2023    Associated Diagnoses: Chronic obstructive pulmonary disease, unspecified COPD type ([facility] [address] [facility]               DISCONTINUED MEDICATIONS:  Current Discharge Medication List          I am the Primary Clinician of Record. Cory Coleman MD (electronically signed)    (Please note that parts of this dictation were completed with voice recognition software. Quite often unanticipated grammatical, syntax, homophones, and other interpretive errors are inadvertently transcribed by the computer software. Please disregards these errors.  Please excuse any errors that have escaped final proofreading.)

## 2023-03-21 NOTE — ED TRIAGE NOTES
Patient presents to ED for c/o right sided body pain as well as shortness of breath x 3 days. Patient moaning in pain during triage.

## 2023-03-21 NOTE — ED NOTES
Pt presents to ED complaining of right sided pain x 3 days and shortness of breath x 1 month. Pt states the pain originates in their lower right back and radiates to their right lower abdominal quadrant. Pt denies N/V/D and denies urinary pain. Pt has hx of COPD and presents to ED wheezing. Pt is alert and oriented x 4, RR even and unlabored, skin is warm and dry. Assessment completed and pt updated on plan of care. Call bell in reach. Emergency Department Nursing Plan of Care       The Nursing Plan of Care is developed from the Nursing assessment and Emergency Department Attending provider initial evaluation. The plan of care may be reviewed in the ED Provider note.     The Plan of Care was developed with the following considerations:   Patient / Family readiness to learn indicated by:verbalized understanding  Persons(s) to be included in education: patient  Barriers to Learning/Limitations:No    Signed     Rudolph Lawler RN    3/21/2023

## 2023-03-22 NOTE — DISCHARGE INSTRUCTIONS
Please follow-up with your primary care with your CT results below for routine outpatient follow-up. INDICATION: Right flank pain. Right upper quadrant and right lower chest pain     COMPARISON: Earlier same day     TECHNIQUE: Unenhanced axial images were obtained through the chest, abdomen, and  pelvis. Oral contrast was not administered. Sagittal and coronal reformats were  performed. CT dose reduction was achieved through use of a standardized protocol  tailored for this examination and automatic exposure control for dose  modulation. FINDINGS:     THYROID: No nodule. MEDIASTINUM: No mass or lymphadenopathy. STEFANI: No mass or lymphadenopathy. THORACIC AORTA: Post distal aortic stent graft placement. Aorta is not dilated. MAIN PULMONARY ARTERY: Mildly enlarged at 3.1 cm  TRACHEA/BRONCHI: Patent. ESOPHAGUS: No wall thickening or dilatation. HEART: Lipoma of the intra-atrial septum. Heart is not enlarged. Mild coronary  artery calcification  PLEURA: No effusion or pneumothorax. LUNGS: Emphysematous change with upper lobe predominance. No consolidation  LIVER: No mass or biliary dilatation. GALLBLADDER: Unremarkable. SPLEEN: No mass. PANCREAS: No mass or ductal dilatation. ADRENALS: 2.9 cm right adrenal nodule measuring -4 Hounsfield units compatible  with an adenoma left adrenal gland is not enlarged  KIDNEYS: Horseshoe kidney without stone or hydronephrosis  STOMACH: Unremarkable. SMALL BOWEL: No dilatation or wall thickening. COLON: Left-sided diverticulosis. No acute diverticulitis  APPENDIX: Not identified  PERITONEUM: No ascites or pneumoperitoneum. RETROPERITONEUM: No lymphadenopathy or aortic aneurysm. Previous IVC filter  placement  REPRODUCTIVE ORGANS: Not enlarged  URINARY BLADDER: No mass or calculus. BONES: No destructive bone lesion. ADDITIONAL COMMENTS: N/A     IMPRESSION     1. Aortic stent graft and emphysema. No acute abnormality of the chest  2.  Horseshoe kidney and diverticulosis of the left colon.  No acute  diverticulitis

## 2023-03-23 LAB
ATRIAL RATE: 67 BPM
CALCULATED P AXIS, ECG09: 55 DEGREES
CALCULATED R AXIS, ECG10: -11 DEGREES
CALCULATED T AXIS, ECG11: 118 DEGREES
DIAGNOSIS, 93000: NORMAL
P-R INTERVAL, ECG05: 144 MS
Q-T INTERVAL, ECG07: 452 MS
QRS DURATION, ECG06: 144 MS
QTC CALCULATION (BEZET), ECG08: 477 MS
VENTRICULAR RATE, ECG03: 67 BPM

## 2023-04-03 PROBLEM — E11.21 TYPE 2 DIABETES WITH NEPHROPATHY (HCC): Status: RESOLVED | Noted: 2018-03-05 | Resolved: 2021-11-22

## 2023-04-17 DIAGNOSIS — I10 ESSENTIAL HYPERTENSION: ICD-10-CM

## 2023-04-17 RX ORDER — AMLODIPINE BESYLATE 10 MG/1
TABLET ORAL
Qty: 90 TABLET | Refills: 1 | Status: SHIPPED | OUTPATIENT
Start: 2023-04-17

## 2023-04-21 DIAGNOSIS — Z12.31 ENCOUNTER FOR SCREENING MAMMOGRAM FOR MALIGNANT NEOPLASM OF BREAST: Primary | ICD-10-CM

## 2023-04-23 DIAGNOSIS — Z78.0 POST-MENOPAUSE: Primary | ICD-10-CM

## 2023-05-03 ENCOUNTER — OFFICE VISIT (OUTPATIENT)
Dept: INTERNAL MEDICINE CLINIC | Age: 68
End: 2023-05-03

## 2023-05-03 VITALS
TEMPERATURE: 97.3 F | HEART RATE: 75 BPM | RESPIRATION RATE: 16 BRPM | HEIGHT: 64 IN | BODY MASS INDEX: 26.05 KG/M2 | WEIGHT: 152.6 LBS | DIASTOLIC BLOOD PRESSURE: 70 MMHG | SYSTOLIC BLOOD PRESSURE: 128 MMHG | OXYGEN SATURATION: 98 %

## 2023-05-03 DIAGNOSIS — F33.0 MAJOR DEPRESSIVE DISORDER, RECURRENT, MILD (HCC): ICD-10-CM

## 2023-05-03 DIAGNOSIS — E11.9 TYPE 2 DIABETES MELLITUS WITHOUT COMPLICATION, WITHOUT LONG-TERM CURRENT USE OF INSULIN (HCC): Primary | ICD-10-CM

## 2023-05-03 DIAGNOSIS — F31.77 BIPOLAR DISORDER, IN PARTIAL REMISSION, MOST RECENT EPISODE MIXED (HCC): ICD-10-CM

## 2023-05-03 DIAGNOSIS — F10.20 ALCOHOLISM (HCC): ICD-10-CM

## 2023-05-03 DIAGNOSIS — E78.00 PURE HYPERCHOLESTEROLEMIA: ICD-10-CM

## 2023-05-03 DIAGNOSIS — E55.9 VITAMIN D DEFICIENCY: ICD-10-CM

## 2023-05-03 DIAGNOSIS — J44.9 CHRONIC OBSTRUCTIVE PULMONARY DISEASE, UNSPECIFIED COPD TYPE (HCC): ICD-10-CM

## 2023-05-03 DIAGNOSIS — I10 ESSENTIAL HYPERTENSION: ICD-10-CM

## 2023-05-03 DIAGNOSIS — Z23 ENCOUNTER FOR IMMUNIZATION: ICD-10-CM

## 2023-05-03 RX ORDER — METOPROLOL TARTRATE 50 MG/1
50 TABLET ORAL DAILY
Qty: 90 TABLET | Refills: 1 | Status: SHIPPED | OUTPATIENT
Start: 2023-05-03

## 2023-05-03 RX ORDER — FLUOXETINE HYDROCHLORIDE 20 MG/1
CAPSULE ORAL
Qty: 90 CAPSULE | Refills: 1 | Status: SHIPPED | OUTPATIENT
Start: 2023-05-03

## 2023-05-03 RX ORDER — LANOLIN ALCOHOL/MO/W.PET/CERES
100 CREAM (GRAM) TOPICAL DAILY
Qty: 90 TABLET | Refills: 1 | Status: SHIPPED | OUTPATIENT
Start: 2023-05-03

## 2023-05-03 RX ORDER — ALBUTEROL SULFATE 90 UG/1
2 AEROSOL, METERED RESPIRATORY (INHALATION)
Qty: 1 EACH | Refills: 2 | Status: SHIPPED | OUTPATIENT
Start: 2023-05-03

## 2023-05-03 RX ORDER — BUPROPION HYDROCHLORIDE 150 MG/1
150 TABLET ORAL DAILY
Qty: 90 TABLET | Refills: 1 | Status: SHIPPED | OUTPATIENT
Start: 2023-05-03

## 2023-05-03 RX ORDER — SIMVASTATIN 20 MG/1
20 TABLET, FILM COATED ORAL
Qty: 90 TABLET | Refills: 1 | Status: SHIPPED | OUTPATIENT
Start: 2023-05-03

## 2023-05-03 RX ORDER — IPRATROPIUM BROMIDE AND ALBUTEROL SULFATE 2.5; .5 MG/3ML; MG/3ML
3 SOLUTION RESPIRATORY (INHALATION)
Qty: 100 EACH | Refills: 1 | Status: SHIPPED | OUTPATIENT
Start: 2023-05-03

## 2023-05-03 RX ORDER — LOSARTAN POTASSIUM 100 MG/1
100 TABLET ORAL DAILY
Qty: 90 TABLET | Refills: 1 | Status: SHIPPED | OUTPATIENT
Start: 2023-05-03

## 2023-05-04 LAB
25(OH)D3+25(OH)D2 SERPL-MCNC: 36.8 NG/ML (ref 30–100)
ALBUMIN SERPL-MCNC: 4.3 G/DL (ref 3.8–4.8)
ALBUMIN/GLOB SERPL: 1.7 {RATIO} (ref 1.2–2.2)
ALP SERPL-CCNC: 102 IU/L (ref 44–121)
ALT SERPL-CCNC: 9 IU/L (ref 0–32)
AST SERPL-CCNC: 14 IU/L (ref 0–40)
BASOPHILS # BLD AUTO: 0 X10E3/UL (ref 0–0.2)
BASOPHILS NFR BLD AUTO: 0 %
BILIRUB SERPL-MCNC: 0.2 MG/DL (ref 0–1.2)
BUN SERPL-MCNC: 6 MG/DL (ref 8–27)
BUN/CREAT SERPL: 14 (ref 12–28)
CALCIUM SERPL-MCNC: 9.7 MG/DL (ref 8.7–10.3)
CHLORIDE SERPL-SCNC: 89 MMOL/L (ref 96–106)
CO2 SERPL-SCNC: 16 MMOL/L (ref 20–29)
CREAT SERPL-MCNC: 0.44 MG/DL (ref 0.57–1)
EGFRCR SERPLBLD CKD-EPI 2021: 105 ML/MIN/1.73
EOSINOPHIL # BLD AUTO: 0.1 X10E3/UL (ref 0–0.4)
EOSINOPHIL NFR BLD AUTO: 1 %
ERYTHROCYTE [DISTWIDTH] IN BLOOD BY AUTOMATED COUNT: 13 % (ref 11.7–15.4)
EST. AVERAGE GLUCOSE BLD GHB EST-MCNC: 117 MG/DL
GLOBULIN SER CALC-MCNC: 2.5 G/DL (ref 1.5–4.5)
GLUCOSE SERPL-MCNC: 118 MG/DL (ref 70–99)
HBA1C MFR BLD: 5.7 % (ref 4.8–5.6)
HCT VFR BLD AUTO: 40.7 % (ref 34–46.6)
HGB BLD-MCNC: 15.1 G/DL (ref 11.1–15.9)
IMM GRANULOCYTES # BLD AUTO: 0 X10E3/UL (ref 0–0.1)
IMM GRANULOCYTES NFR BLD AUTO: 1 %
LYMPHOCYTES # BLD AUTO: 0.8 X10E3/UL (ref 0.7–3.1)
LYMPHOCYTES NFR BLD AUTO: 11 %
MCH RBC QN AUTO: 36.2 PG (ref 26.6–33)
MCHC RBC AUTO-ENTMCNC: 37.1 G/DL (ref 31.5–35.7)
MCV RBC AUTO: 98 FL (ref 79–97)
MONOCYTES # BLD AUTO: 0.7 X10E3/UL (ref 0.1–0.9)
MONOCYTES NFR BLD AUTO: 10 %
MORPHOLOGY BLD-IMP: ABNORMAL
NEUTROPHILS # BLD AUTO: 6.1 X10E3/UL (ref 1.4–7)
NEUTROPHILS NFR BLD AUTO: 77 %
PLATELET # BLD AUTO: 313 X10E3/UL (ref 150–450)
POTASSIUM SERPL-SCNC: 4.3 MMOL/L (ref 3.5–5.2)
PROT SERPL-MCNC: 6.8 G/DL (ref 6–8.5)
RBC # BLD AUTO: 4.17 X10E6/UL (ref 3.77–5.28)
SODIUM SERPL-SCNC: 123 MMOL/L (ref 134–144)
VIT B12 SERPL-MCNC: 738 PG/ML (ref 232–1245)
WBC # BLD AUTO: 7.8 X10E3/UL (ref 3.4–10.8)

## 2023-05-05 ENCOUNTER — TELEPHONE (OUTPATIENT)
Dept: INTERNAL MEDICINE CLINIC | Age: 68
End: 2023-05-05

## 2023-05-11 ENCOUNTER — TELEPHONE (OUTPATIENT)
Age: 68
End: 2023-05-11

## 2023-05-11 DIAGNOSIS — E87.1 LOW SODIUM LEVELS: Primary | ICD-10-CM

## 2023-05-11 NOTE — TELEPHONE ENCOUNTER
----- Message from Dank Munoz MD sent at 5/5/2023  8:08 AM EDT -----  Very low sodium level. Need to stop taking oxcarbazepine. She is to contact her psychiatrist.  Limit too much water intake and she should have repeat of salt every day for next couple of weeks. Repeat sodium level in 2 weeks. Prediabetic.   Vitamin D level normal.

## 2023-05-26 DIAGNOSIS — E11.9 TYPE 2 DIABETES MELLITUS WITHOUT COMPLICATIONS (HCC): ICD-10-CM

## 2023-10-12 ENCOUNTER — OFFICE VISIT (OUTPATIENT)
Age: 68
End: 2023-10-12
Payer: MEDICARE

## 2023-10-12 VITALS
RESPIRATION RATE: 16 BRPM | HEART RATE: 75 BPM | OXYGEN SATURATION: 97 % | DIASTOLIC BLOOD PRESSURE: 70 MMHG | SYSTOLIC BLOOD PRESSURE: 138 MMHG | WEIGHT: 160.2 LBS | BODY MASS INDEX: 27.35 KG/M2 | TEMPERATURE: 96.9 F | HEIGHT: 64 IN

## 2023-10-12 DIAGNOSIS — Z79.4 TYPE 2 DIABETES MELLITUS WITH STAGE 3A CHRONIC KIDNEY DISEASE, WITH LONG-TERM CURRENT USE OF INSULIN (HCC): ICD-10-CM

## 2023-10-12 DIAGNOSIS — F10.10 ALCOHOL ABUSE: ICD-10-CM

## 2023-10-12 DIAGNOSIS — J44.9 CHRONIC OBSTRUCTIVE PULMONARY DISEASE, UNSPECIFIED COPD TYPE (HCC): ICD-10-CM

## 2023-10-12 DIAGNOSIS — Z79.4 TYPE 2 DIABETES MELLITUS WITHOUT COMPLICATION, WITH LONG-TERM CURRENT USE OF INSULIN (HCC): ICD-10-CM

## 2023-10-12 DIAGNOSIS — F33.0 MAJOR DEPRESSIVE DISORDER, RECURRENT, MILD (HCC): ICD-10-CM

## 2023-10-12 DIAGNOSIS — N18.31 TYPE 2 DIABETES MELLITUS WITH STAGE 3A CHRONIC KIDNEY DISEASE, WITH LONG-TERM CURRENT USE OF INSULIN (HCC): ICD-10-CM

## 2023-10-12 DIAGNOSIS — L85.3 DRY SKIN: ICD-10-CM

## 2023-10-12 DIAGNOSIS — I10 PRIMARY HYPERTENSION: Primary | ICD-10-CM

## 2023-10-12 DIAGNOSIS — E78.00 PURE HYPERCHOLESTEROLEMIA: ICD-10-CM

## 2023-10-12 DIAGNOSIS — E11.22 TYPE 2 DIABETES MELLITUS WITH STAGE 3A CHRONIC KIDNEY DISEASE, WITH LONG-TERM CURRENT USE OF INSULIN (HCC): ICD-10-CM

## 2023-10-12 DIAGNOSIS — E11.9 TYPE 2 DIABETES MELLITUS WITHOUT COMPLICATION, WITH LONG-TERM CURRENT USE OF INSULIN (HCC): ICD-10-CM

## 2023-10-12 DIAGNOSIS — E53.8 B12 DEFICIENCY: ICD-10-CM

## 2023-10-12 PROCEDURE — 99214 OFFICE O/P EST MOD 30 MIN: CPT | Performed by: INTERNAL MEDICINE

## 2023-10-12 PROCEDURE — 3078F DIAST BP <80 MM HG: CPT | Performed by: INTERNAL MEDICINE

## 2023-10-12 PROCEDURE — 1123F ACP DISCUSS/DSCN MKR DOCD: CPT | Performed by: INTERNAL MEDICINE

## 2023-10-12 PROCEDURE — 3044F HG A1C LEVEL LT 7.0%: CPT | Performed by: INTERNAL MEDICINE

## 2023-10-12 PROCEDURE — 3075F SYST BP GE 130 - 139MM HG: CPT | Performed by: INTERNAL MEDICINE

## 2023-10-12 RX ORDER — AMMONIUM LACTATE 12 G/100G
CREAM TOPICAL
Qty: 1 EACH | Refills: 2 | Status: SHIPPED | OUTPATIENT
Start: 2023-10-12 | End: 2023-11-11

## 2023-10-12 RX ORDER — ALBUTEROL SULFATE 90 UG/1
2 AEROSOL, METERED RESPIRATORY (INHALATION) EVERY 6 HOURS PRN
Qty: 18 G | Refills: 3 | Status: SHIPPED | OUTPATIENT
Start: 2023-10-12

## 2023-10-12 RX ORDER — AMLODIPINE BESYLATE 10 MG/1
10 TABLET ORAL DAILY
Qty: 90 TABLET | Refills: 1 | Status: SHIPPED | OUTPATIENT
Start: 2023-10-12

## 2023-10-12 RX ORDER — FLUOXETINE HYDROCHLORIDE 20 MG/1
20 CAPSULE ORAL EVERY MORNING
Qty: 90 CAPSULE | Refills: 1 | Status: SHIPPED | OUTPATIENT
Start: 2023-10-12

## 2023-10-12 RX ORDER — LOSARTAN POTASSIUM 100 MG/1
100 TABLET ORAL DAILY
Qty: 90 TABLET | Refills: 1 | Status: SHIPPED | OUTPATIENT
Start: 2023-10-12

## 2023-10-12 RX ORDER — METOPROLOL TARTRATE 50 MG/1
50 TABLET, FILM COATED ORAL DAILY
Qty: 90 TABLET | Refills: 1 | Status: SHIPPED | OUTPATIENT
Start: 2023-10-12

## 2023-10-12 RX ORDER — BUPROPION HYDROCHLORIDE 150 MG/1
150 TABLET ORAL DAILY
Qty: 90 TABLET | Refills: 1 | Status: SHIPPED | OUTPATIENT
Start: 2023-10-12

## 2023-10-12 RX ORDER — LANOLIN ALCOHOL/MO/W.PET/CERES
100 CREAM (GRAM) TOPICAL DAILY
Qty: 30 TABLET | Refills: 5 | Status: SHIPPED | OUTPATIENT
Start: 2023-10-12

## 2023-10-12 RX ORDER — CHOLECALCIFEROL (VITAMIN D3) 125 MCG
500 CAPSULE ORAL DAILY
Qty: 30 TABLET | Refills: 3 | Status: SHIPPED | OUTPATIENT
Start: 2023-10-12 | End: 2024-10-11

## 2023-10-12 RX ORDER — SIMVASTATIN 20 MG
20 TABLET ORAL NIGHTLY
Qty: 90 TABLET | Refills: 1 | Status: SHIPPED | OUTPATIENT
Start: 2023-10-12

## 2023-10-12 SDOH — ECONOMIC STABILITY: FOOD INSECURITY: WITHIN THE PAST 12 MONTHS, THE FOOD YOU BOUGHT JUST DIDN'T LAST AND YOU DIDN'T HAVE MONEY TO GET MORE.: SOMETIMES TRUE

## 2023-10-12 SDOH — ECONOMIC STABILITY: INCOME INSECURITY: HOW HARD IS IT FOR YOU TO PAY FOR THE VERY BASICS LIKE FOOD, HOUSING, MEDICAL CARE, AND HEATING?: SOMEWHAT HARD

## 2023-10-12 SDOH — ECONOMIC STABILITY: HOUSING INSECURITY
IN THE LAST 12 MONTHS, WAS THERE A TIME WHEN YOU DID NOT HAVE A STEADY PLACE TO SLEEP OR SLEPT IN A SHELTER (INCLUDING NOW)?: YES

## 2023-10-12 SDOH — ECONOMIC STABILITY: FOOD INSECURITY: WITHIN THE PAST 12 MONTHS, YOU WORRIED THAT YOUR FOOD WOULD RUN OUT BEFORE YOU GOT MONEY TO BUY MORE.: SOMETIMES TRUE

## 2023-10-12 ASSESSMENT — PATIENT HEALTH QUESTIONNAIRE - PHQ9
2. FEELING DOWN, DEPRESSED OR HOPELESS: 1
SUM OF ALL RESPONSES TO PHQ QUESTIONS 1-9: 9
9. THOUGHTS THAT YOU WOULD BE BETTER OFF DEAD, OR OF HURTING YOURSELF: 0
3. TROUBLE FALLING OR STAYING ASLEEP: 2
7. TROUBLE CONCENTRATING ON THINGS, SUCH AS READING THE NEWSPAPER OR WATCHING TELEVISION: 0
SUM OF ALL RESPONSES TO PHQ QUESTIONS 1-9: 9
5. POOR APPETITE OR OVEREATING: 3
4. FEELING TIRED OR HAVING LITTLE ENERGY: 2
10. IF YOU CHECKED OFF ANY PROBLEMS, HOW DIFFICULT HAVE THESE PROBLEMS MADE IT FOR YOU TO DO YOUR WORK, TAKE CARE OF THINGS AT HOME, OR GET ALONG WITH OTHER PEOPLE: 2
SUM OF ALL RESPONSES TO PHQ QUESTIONS 1-9: 9
SUM OF ALL RESPONSES TO PHQ QUESTIONS 1-9: 9
1. LITTLE INTEREST OR PLEASURE IN DOING THINGS: 1
SUM OF ALL RESPONSES TO PHQ9 QUESTIONS 1 & 2: 2
8. MOVING OR SPEAKING SO SLOWLY THAT OTHER PEOPLE COULD HAVE NOTICED. OR THE OPPOSITE, BEING SO FIGETY OR RESTLESS THAT YOU HAVE BEEN MOVING AROUND A LOT MORE THAN USUAL: 0
6. FEELING BAD ABOUT YOURSELF - OR THAT YOU ARE A FAILURE OR HAVE LET YOURSELF OR YOUR FAMILY DOWN: 0

## 2023-10-12 ASSESSMENT — ENCOUNTER SYMPTOMS
GASTROINTESTINAL NEGATIVE: 1
RESPIRATORY NEGATIVE: 1
EYES NEGATIVE: 1

## 2023-10-12 ASSESSMENT — COPD QUESTIONNAIRES: COPD: 1

## 2023-10-12 NOTE — PROGRESS NOTES
Subjective:      Patient ID: Dalia Saeed is a 77 y.o. female here for follow-up. She is accompanied by her brother. She is living at her brother's place in [place], [place]. She is drinking a lot. She is at least drinking 4 beers a day. She is not eating much. Having dry skin. Also smoking 1 and half pack a day. Her brother is already very annoyed and frustrated. Has hypertension, compliant with medication. Denies chest pain palpitation shortness of breath. Has hyperlipidemia, on statin. No myalgia. She has bipolar depression and major depression. Taking multiple medications. Did not see psychiatrist long time. No suicidal thought. Has COPD from long-term smoking. Using Anoro Ellipta. Doing well. Hypertension    COPD    Diabetes    Weight Management    Review of Systems   Constitutional: Negative. HENT: Negative. Eyes: Negative. Respiratory: Negative. Cardiovascular: Negative. Gastrointestinal: Negative. Endocrine: Negative. Genitourinary: Negative. Musculoskeletal: Negative. Neurological: Negative. Hematological: Negative. Psychiatric/Behavioral: Negative. Physical Exam  Constitutional:       Appearance: Normal appearance. She is obese. Cardiovascular:      Rate and Rhythm: Normal rate and regular rhythm. Pulses: Normal pulses. Heart sounds: Normal heart sounds. Pulmonary:      Effort: Pulmonary effort is normal.      Breath sounds: Normal breath sounds. Musculoskeletal:         General: Normal range of motion. Cervical back: Normal range of motion and neck supple. Neurological:      General: No focal deficit present. Mental Status: She is alert and oriented to person, place, and time. Mental status is at baseline. Psychiatric:         Mood and Affect: Mood normal.         Behavior: Behavior normal.         Thought Content: Thought content normal.      Comments: Depression present.      Assessment / Plan:

## 2023-10-13 LAB
ALBUMIN SERPL-MCNC: 4.8 G/DL (ref 3.9–4.9)
ALBUMIN/CREAT UR: 495 MG/G CREAT (ref 0–29)
ALBUMIN/GLOB SERPL: 1.7 {RATIO} (ref 1.2–2.2)
ALP SERPL-CCNC: 105 IU/L (ref 44–121)
ALT SERPL-CCNC: 10 IU/L (ref 0–32)
AST SERPL-CCNC: 24 IU/L (ref 0–40)
BILIRUB SERPL-MCNC: 0.8 MG/DL (ref 0–1.2)
BUN SERPL-MCNC: 8 MG/DL (ref 8–27)
BUN/CREAT SERPL: 12 (ref 12–28)
CALCIUM SERPL-MCNC: 9.7 MG/DL (ref 8.7–10.3)
CHLORIDE SERPL-SCNC: 95 MMOL/L (ref 96–106)
CHOLEST SERPL-MCNC: 264 MG/DL (ref 100–199)
CO2 SERPL-SCNC: 18 MMOL/L (ref 20–29)
CREAT SERPL-MCNC: 0.68 MG/DL (ref 0.57–1)
CREAT UR-MCNC: 40.2 MG/DL
EGFRCR SERPLBLD CKD-EPI 2021: 94 ML/MIN/1.73
GLOBULIN SER CALC-MCNC: 2.9 G/DL (ref 1.5–4.5)
GLUCOSE SERPL-MCNC: ABNORMAL MG/DL
HBA1C MFR BLD: 5.6 % (ref 4.8–5.6)
HDLC SERPL-MCNC: 77 MG/DL
IMP & REVIEW OF LAB RESULTS: NORMAL
LDLC SERPL CALC-MCNC: 166 MG/DL (ref 0–99)
MICROALBUMIN UR-MCNC: 199.1 UG/ML
POTASSIUM SERPL-SCNC: 4.8 MMOL/L (ref 3.5–5.2)
PROT SERPL-MCNC: 7.7 G/DL (ref 6–8.5)
SODIUM SERPL-SCNC: 144 MMOL/L (ref 134–144)
TRIGL SERPL-MCNC: 121 MG/DL (ref 0–149)
VIT B12 SERPL-MCNC: 874 PG/ML (ref 232–1245)
VLDLC SERPL CALC-MCNC: 21 MG/DL (ref 5–40)

## 2023-10-16 NOTE — PROGRESS NOTES
Osteopenia. Adv to have caltrate,oscal or citracal  600 mg po BID and have more milk product in diet. continue medications as needed

## 2023-10-24 DIAGNOSIS — E78.00 PURE HYPERCHOLESTEROLEMIA: Primary | ICD-10-CM

## 2023-10-24 DIAGNOSIS — Z79.899 ON STATIN THERAPY: ICD-10-CM

## 2023-10-24 NOTE — TELEPHONE ENCOUNTER
----- Message from Jaziel Mackey MD sent at 10/24/2023  8:14 AM EDT -----  Leaking protein in worse, be on ADA diet and exercise. High total cholesterol and LDL, increase Zocor 40 mg at bedtime. Repeat AST ALT in a month.   A1c normal.

## 2023-10-27 RX ORDER — SIMVASTATIN 40 MG
40 TABLET ORAL EVERY EVENING
Qty: 90 TABLET | Refills: 1 | Status: SHIPPED | OUTPATIENT
Start: 2023-10-27

## 2023-11-02 ENCOUNTER — TELEPHONE (OUTPATIENT)
Age: 68
End: 2023-11-02

## 2023-11-02 NOTE — TELEPHONE ENCOUNTER
----- Message from [facility] Enedelia sent at 11/2/2023  3:55 PM EDT -----  Subject: Results Request    QUESTIONS  Results: ALT. ALS; Ordered by:   Date Performed: 2023-10-24  ---------------------------------------------------------------------------  --------------  Bere Marker INFO    [id number];  Do not leave any message, patient will call back for answer  ---------------------------------------------------------------------------  --------------

## 2023-11-24 DIAGNOSIS — Z79.899 ON STATIN THERAPY: ICD-10-CM

## 2023-12-05 RX ORDER — OXCARBAZEPINE 300 MG/1
TABLET, FILM COATED ORAL
Qty: 60 TABLET | Refills: 2 | OUTPATIENT
Start: 2023-12-05

## 2023-12-05 NOTE — TELEPHONE ENCOUNTER
Last appt 10/12/2023      Next Apt:     Future Appointments   Date Time Provider Department Center   2/12/2024  1:15 PM Erika Tipton MD [facility]

## 2024-04-03 DIAGNOSIS — I10 PRIMARY HYPERTENSION: ICD-10-CM

## 2024-04-03 DIAGNOSIS — F33.0 MAJOR DEPRESSIVE DISORDER, RECURRENT, MILD (HCC): ICD-10-CM

## 2024-04-04 RX ORDER — AMLODIPINE BESYLATE 10 MG/1
10 TABLET ORAL DAILY
Qty: 90 TABLET | Refills: 1 | OUTPATIENT
Start: 2024-04-04

## 2024-04-04 RX ORDER — BUPROPION HYDROCHLORIDE 150 MG/1
150 TABLET ORAL DAILY
Qty: 90 TABLET | Refills: 1 | OUTPATIENT
Start: 2024-04-04

## 2024-04-22 DIAGNOSIS — F33.0 MAJOR DEPRESSIVE DISORDER, RECURRENT, MILD (HCC): ICD-10-CM

## 2024-04-22 DIAGNOSIS — I10 PRIMARY HYPERTENSION: ICD-10-CM

## 2024-04-23 DIAGNOSIS — I10 PRIMARY HYPERTENSION: ICD-10-CM

## 2024-04-24 RX ORDER — FLUOXETINE HYDROCHLORIDE 20 MG/1
20 CAPSULE ORAL EVERY MORNING
Qty: 30 CAPSULE | Refills: 0 | Status: SHIPPED | OUTPATIENT
Start: 2024-04-24

## 2024-04-24 RX ORDER — LOSARTAN POTASSIUM 100 MG/1
100 TABLET ORAL DAILY
Qty: 30 TABLET | Refills: 0 | Status: SHIPPED | OUTPATIENT
Start: 2024-04-24

## 2024-04-29 RX ORDER — AMLODIPINE BESYLATE 10 MG/1
10 TABLET ORAL DAILY
Qty: 90 TABLET | Refills: 1 | OUTPATIENT
Start: 2024-04-29

## 2024-05-07 DIAGNOSIS — I10 PRIMARY HYPERTENSION: ICD-10-CM

## 2024-05-07 DIAGNOSIS — E11.9 TYPE 2 DIABETES MELLITUS WITHOUT COMPLICATIONS (HCC): ICD-10-CM

## 2024-05-07 RX ORDER — METOPROLOL TARTRATE 50 MG/1
50 TABLET, FILM COATED ORAL DAILY
Qty: 90 TABLET | Refills: 1 | OUTPATIENT
Start: 2024-05-07

## 2024-05-07 NOTE — TELEPHONE ENCOUNTER
Last appt 10/12/2023      Next Apt:     No future appointments.      [id number] - [place] - [address] - P [phone] - F [phone]  [address]  [facility] [id number]  Phone: [phone] Fax: [phone]

## 2024-05-15 DIAGNOSIS — I10 PRIMARY HYPERTENSION: ICD-10-CM

## 2024-05-16 RX ORDER — METOPROLOL TARTRATE 50 MG/1
50 TABLET, FILM COATED ORAL DAILY
Qty: 30 TABLET | Refills: 0 | Status: SHIPPED | OUTPATIENT
Start: 2024-05-16

## 2024-05-17 DIAGNOSIS — I10 PRIMARY HYPERTENSION: ICD-10-CM

## 2024-05-17 DIAGNOSIS — F33.0 MAJOR DEPRESSIVE DISORDER, RECURRENT, MILD (HCC): ICD-10-CM

## 2024-05-20 RX ORDER — FLUOXETINE HYDROCHLORIDE 20 MG/1
20 CAPSULE ORAL EVERY MORNING
Qty: 90 CAPSULE | Refills: 1 | OUTPATIENT
Start: 2024-05-20

## 2024-05-20 RX ORDER — LOSARTAN POTASSIUM 100 MG/1
100 TABLET ORAL DAILY
Qty: 90 TABLET | Refills: 1 | OUTPATIENT
Start: 2024-05-20

## 2024-05-21 DIAGNOSIS — F33.0 MAJOR DEPRESSIVE DISORDER, RECURRENT, MILD (HCC): ICD-10-CM

## 2024-05-21 DIAGNOSIS — I10 PRIMARY HYPERTENSION: ICD-10-CM

## 2024-05-21 RX ORDER — LOSARTAN POTASSIUM 100 MG/1
100 TABLET ORAL DAILY
Qty: 30 TABLET | Refills: 0 | OUTPATIENT
Start: 2024-05-21

## 2024-05-21 RX ORDER — FLUOXETINE HYDROCHLORIDE 20 MG/1
20 CAPSULE ORAL EVERY MORNING
Qty: 30 CAPSULE | Refills: 0 | OUTPATIENT
Start: 2024-05-21

## 2024-05-21 NOTE — TELEPHONE ENCOUNTER
Attempted to contact patient to schedule a follow up appt as she is overdue, she did not answer, I was unable to leave a vm as her mailbox was full.

## 2024-06-07 ENCOUNTER — TELEPHONE (OUTPATIENT)
Age: 69
End: 2024-06-07

## 2024-06-07 DIAGNOSIS — I10 PRIMARY HYPERTENSION: ICD-10-CM

## 2024-06-07 RX ORDER — METOPROLOL TARTRATE 50 MG/1
50 TABLET, FILM COATED ORAL DAILY
Qty: 90 TABLET | Refills: 1 | OUTPATIENT
Start: 2024-06-07

## 2024-06-07 NOTE — TELEPHONE ENCOUNTER
Needs apt.     Marquita Monique LPN SM    5/21/24 10:17 AM  Note     Appt required.         Marquita Monique LPN SM    5/21/24 12:58 PM  Note     Attempted to contact patient to schedule a follow up appt as she is overdue, she did not answer, I was unable to leave a vm as her mailbox was full.         Moon Beltrán LPN KR    5/21/24  4:10 PM  Note     Call placed to pt mail box full, appt needed for follow up

## 2024-06-07 NOTE — TELEPHONE ENCOUNTER
LOV: 10/12/2023  NOV: None    Medication: losartan (COZAAR) 100 MG tablet   Quantity: 90    Pharmacy: [id number] - [place] - [address] - P [phone] - F [phone] [[id number]]

## 2024-06-11 DIAGNOSIS — I10 PRIMARY HYPERTENSION: ICD-10-CM

## 2024-06-11 RX ORDER — METOPROLOL TARTRATE 50 MG/1
50 TABLET, FILM COATED ORAL DAILY
Qty: 30 TABLET | Refills: 0 | OUTPATIENT
Start: 2024-06-11

## 2024-06-12 DIAGNOSIS — I10 PRIMARY HYPERTENSION: ICD-10-CM

## 2024-06-13 RX ORDER — LOSARTAN POTASSIUM 100 MG/1
100 TABLET ORAL DAILY
Qty: 30 TABLET | Refills: 0 | OUTPATIENT
Start: 2024-06-13

## 2024-06-18 DIAGNOSIS — I10 PRIMARY HYPERTENSION: ICD-10-CM

## 2024-06-18 RX ORDER — METOPROLOL TARTRATE 50 MG/1
50 TABLET, FILM COATED ORAL DAILY
Qty: 30 TABLET | Refills: 0 | OUTPATIENT
Start: 2024-06-18

## 2024-06-19 DIAGNOSIS — I10 PRIMARY HYPERTENSION: ICD-10-CM

## 2024-06-20 DIAGNOSIS — I10 PRIMARY HYPERTENSION: ICD-10-CM

## 2024-06-20 RX ORDER — METOPROLOL TARTRATE 50 MG/1
50 TABLET, FILM COATED ORAL DAILY
Qty: 30 TABLET | Refills: 0 | OUTPATIENT
Start: 2024-06-20

## 2024-06-21 RX ORDER — METOPROLOL TARTRATE 50 MG/1
50 TABLET, FILM COATED ORAL DAILY
Qty: 30 TABLET | Refills: 0 | OUTPATIENT
Start: 2024-06-21

## 2024-07-02 DIAGNOSIS — I10 PRIMARY HYPERTENSION: ICD-10-CM

## 2024-07-02 RX ORDER — LOSARTAN POTASSIUM 100 MG/1
100 TABLET ORAL DAILY
Qty: 30 TABLET | Refills: 0 | OUTPATIENT
Start: 2024-07-02